# Patient Record
Sex: MALE | Race: WHITE | NOT HISPANIC OR LATINO | Employment: FULL TIME | URBAN - METROPOLITAN AREA
[De-identification: names, ages, dates, MRNs, and addresses within clinical notes are randomized per-mention and may not be internally consistent; named-entity substitution may affect disease eponyms.]

---

## 2023-11-16 ENCOUNTER — OFFICE VISIT (OUTPATIENT)
Dept: PRIMARY CARE | Facility: CLINIC | Age: 68
End: 2023-11-16
Payer: COMMERCIAL

## 2023-11-16 VITALS
HEART RATE: 74 BPM | SYSTOLIC BLOOD PRESSURE: 124 MMHG | TEMPERATURE: 97.6 F | DIASTOLIC BLOOD PRESSURE: 76 MMHG | OXYGEN SATURATION: 98 % | WEIGHT: 189 LBS | HEIGHT: 68 IN | BODY MASS INDEX: 28.64 KG/M2

## 2023-11-16 DIAGNOSIS — Z00.00 ROUTINE ADULT HEALTH MAINTENANCE: ICD-10-CM

## 2023-11-16 DIAGNOSIS — M25.511 CHRONIC PAIN OF BOTH SHOULDERS: ICD-10-CM

## 2023-11-16 DIAGNOSIS — Z23 FLU VACCINE NEED: ICD-10-CM

## 2023-11-16 DIAGNOSIS — M25.512 CHRONIC PAIN OF BOTH SHOULDERS: ICD-10-CM

## 2023-11-16 DIAGNOSIS — Z00.00 ROUTINE GENERAL MEDICAL EXAMINATION AT HEALTH CARE FACILITY: Primary | ICD-10-CM

## 2023-11-16 DIAGNOSIS — G89.29 CHRONIC PAIN OF BOTH SHOULDERS: ICD-10-CM

## 2023-11-16 DIAGNOSIS — Z00.00 MEDICARE ANNUAL WELLNESS VISIT, SUBSEQUENT: ICD-10-CM

## 2023-11-16 DIAGNOSIS — Z23 NEED FOR PNEUMOCOCCAL VACCINE: ICD-10-CM

## 2023-11-16 DIAGNOSIS — Z11.59 NEED FOR HEPATITIS C SCREENING TEST: ICD-10-CM

## 2023-11-16 DIAGNOSIS — Z12.5 SCREENING FOR PROSTATE CANCER: ICD-10-CM

## 2023-11-16 DIAGNOSIS — Z13.6 SCREENING FOR AAA (ABDOMINAL AORTIC ANEURYSM): ICD-10-CM

## 2023-11-16 PROCEDURE — 90677 PCV20 VACCINE IM: CPT | Performed by: FAMILY MEDICINE

## 2023-11-16 PROCEDURE — 90472 IMMUNIZATION ADMIN EACH ADD: CPT | Performed by: FAMILY MEDICINE

## 2023-11-16 PROCEDURE — G0438 PPPS, INITIAL VISIT: HCPCS | Performed by: FAMILY MEDICINE

## 2023-11-16 PROCEDURE — 1159F MED LIST DOCD IN RCRD: CPT | Performed by: FAMILY MEDICINE

## 2023-11-16 PROCEDURE — 99397 PER PM REEVAL EST PAT 65+ YR: CPT | Performed by: FAMILY MEDICINE

## 2023-11-16 PROCEDURE — 90471 IMMUNIZATION ADMIN: CPT | Performed by: FAMILY MEDICINE

## 2023-11-16 PROCEDURE — 99214 OFFICE O/P EST MOD 30 MIN: CPT | Performed by: FAMILY MEDICINE

## 2023-11-16 PROCEDURE — 96372 THER/PROPH/DIAG INJ SC/IM: CPT | Performed by: FAMILY MEDICINE

## 2023-11-16 PROCEDURE — 1170F FXNL STATUS ASSESSED: CPT | Performed by: FAMILY MEDICINE

## 2023-11-16 PROCEDURE — 90662 IIV NO PRSV INCREASED AG IM: CPT | Performed by: FAMILY MEDICINE

## 2023-11-16 PROCEDURE — 1036F TOBACCO NON-USER: CPT | Performed by: FAMILY MEDICINE

## 2023-11-16 RX ORDER — TRIAMCINOLONE ACETONIDE 40 MG/ML
40 INJECTION, SUSPENSION INTRA-ARTICULAR; INTRAMUSCULAR ONCE
Status: COMPLETED | OUTPATIENT
Start: 2023-11-16 | End: 2023-11-16

## 2023-11-16 RX ORDER — OMEPRAZOLE 20 MG/1
20 CAPSULE, DELAYED RELEASE ORAL DAILY
COMMUNITY
Start: 2022-11-16 | End: 2024-03-22 | Stop reason: ALTCHOICE

## 2023-11-16 RX ORDER — LIDOCAINE HYDROCHLORIDE 20 MG/ML
2 INJECTION, SOLUTION INFILTRATION; PERINEURAL ONCE
Status: COMPLETED | OUTPATIENT
Start: 2023-11-16 | End: 2023-11-16

## 2023-11-16 RX ORDER — FLUTICASONE PROPIONATE 50 MCG
2 SPRAY, SUSPENSION (ML) NASAL DAILY
COMMUNITY
Start: 2017-08-03 | End: 2024-03-22 | Stop reason: ALTCHOICE

## 2023-11-16 RX ADMIN — TRIAMCINOLONE ACETONIDE 40 MG: 40 INJECTION, SUSPENSION INTRA-ARTICULAR; INTRAMUSCULAR at 10:51

## 2023-11-16 RX ADMIN — TRIAMCINOLONE ACETONIDE 40 MG: 40 INJECTION, SUSPENSION INTRA-ARTICULAR; INTRAMUSCULAR at 10:49

## 2023-11-16 RX ADMIN — LIDOCAINE HYDROCHLORIDE 2 ML: 20 INJECTION, SOLUTION INFILTRATION; PERINEURAL at 10:50

## 2023-11-16 RX ADMIN — LIDOCAINE HYDROCHLORIDE 2 ML: 20 INJECTION, SOLUTION INFILTRATION; PERINEURAL at 10:49

## 2023-11-16 ASSESSMENT — PATIENT HEALTH QUESTIONNAIRE - PHQ9
2. FEELING DOWN, DEPRESSED OR HOPELESS: NOT AT ALL
1. LITTLE INTEREST OR PLEASURE IN DOING THINGS: NOT AT ALL
SUM OF ALL RESPONSES TO PHQ9 QUESTIONS 1 AND 2: 0

## 2023-11-16 ASSESSMENT — ACTIVITIES OF DAILY LIVING (ADL)
BATHING: INDEPENDENT
GROCERY_SHOPPING: INDEPENDENT
DRESSING: INDEPENDENT
DOING_HOUSEWORK: INDEPENDENT
MANAGING_FINANCES: INDEPENDENT
TAKING_MEDICATION: INDEPENDENT

## 2023-11-16 NOTE — PROGRESS NOTES
"Subjective   Reason for Visit: Bony Fiore Sr. is an 68 y.o. male here for a Medicare Wellness visit.          Reviewed all medications by prescribing practitioner or clinical pharmacist (such as prescriptions, OTCs, herbal therapies and supplements) and documented in the medical record.    HPI    Preparing meals - independent  Using telephone - independent  Bladder - continent  Bowel - continent    Recent hospital stays - none    ADLs - able to dress, walk and bath independently  Instrumental ADL's - able to shop, maintain finances, managing medications, housekeeping independently    Depression: PHQ-2 (screening 2 mins) - negative    Opioid use -   none  Exercise -  active every day  Diet - well balanced  Pain score - significant pain in B shoulders    Providers    [employer] dementia screen - 5/5    Education - educated pt on healthy eating, exercise    Falls - none      Shoulder pain: continuing to have shoulder pain.  Not able to take time off work.  Would be interested in getting more injections.      Counseled pt on living will: encouraged pt to get living will done.    AAA screening: ordered     Prostate CA screen: ordered PSA    CV screening: stress test neg in 2022.    Colonoscopy: neg 2 years ago, fu in 26    Diabetes screening:  checking    Glaucoma screen:  sees annually, has cataract.    CT chest tob screen: NA    Vaccines: ordering flu and Prevnar.  Pt declines COVID.      Patient Care Team:  Ernie Chaidez MD as PCP - General  Ernie Chaidez MD as PCP - Employee [employer] PCP     Review of Systems   All other systems reviewed and are negative.      Objective   Vitals:  /76   Pulse 74   Temp 36.4 °C (97.6 °F)   Ht 1.727 m (5' 8\")   Wt 85.7 kg (189 lb)   SpO2 98%   BMI 28.74 kg/m²       Physical Exam  Vitals reviewed.   Constitutional:       General: He is not in acute distress.     Appearance: Normal appearance. He is well-developed. He is not diaphoretic.   HENT:      Head: Normocephalic " and atraumatic.      Right Ear: Tympanic membrane normal.      Left Ear: Tympanic membrane normal.      Nose: Nose normal.      Mouth/Throat:      Mouth: Mucous membranes are moist.   Eyes:      Pupils: Pupils are equal, round, and reactive to light.   Cardiovascular:      Rate and Rhythm: Normal rate and regular rhythm.      Heart sounds: Normal heart sounds. No murmur heard.     No friction rub. No gallop.   Pulmonary:      Effort: Pulmonary effort is normal.      Breath sounds: Normal breath sounds. No rales.   Abdominal:      General: Bowel sounds are normal.      Palpations: Abdomen is soft.      Tenderness: There is no abdominal tenderness.   Musculoskeletal:      Cervical back: Normal range of motion and neck supple.   Skin:     General: Skin is warm and dry.   Neurological:      Mental Status: He is alert.   Psychiatric:         Mood and Affect: Mood normal.         Assessment/Plan   Problem List Items Addressed This Visit    None  Visit Diagnoses       Routine general medical examination at health care facility    -  Primary    Relevant Orders    Lipid Panel    Comprehensive Metabolic Panel    Hemoglobin A1C    Chronic pain of both shoulders        Relevant Medications    triamcinolone acetonide (Kenalog-40) injection 40 mg (Completed)    lidocaine (Xylocaine) 20 mg/mL (2 %) injection 2 mL (Completed)    triamcinolone acetonide (Kenalog-40) injection 40 mg (Completed)    lidocaine (Xylocaine) 20 mg/mL (2 %) injection 2 mL (Completed)    Other Relevant Orders    Referral to Orthopaedic Surgery    XR shoulder left 2+ views    XR shoulder right 2+ views    Screening for prostate cancer        Relevant Orders    Prostate Specific Antigen    Screening for AAA (abdominal aortic aneurysm)        Relevant Orders    Vascular US Abdominal Aorta Aneurysm AAA Screening    Need for hepatitis C screening test        Relevant Orders    Hepatitis C Antibody    Flu vaccine need        Relevant Orders    Flu vaccine,  quadrivalent, high-dose, preservative free, age 65y+ (FLUZONE) (Completed)    Need for pneumococcal vaccine        Relevant Orders    Pneumococcal conjugate vaccine, 20-valent (PREVNAR 20) (Completed)    Medicare annual wellness visit, subsequent        Routine adult health maintenance              PROCEDURE NOTE: Verbal consent obtained from patient.  B shoulders cleaned w/ ETOH swabs.  Injected B shoulders from ant approach w/ 2 CC of lidocaine and 1 CC of kenalog.  No bleeding observed.  Pt tolerated procedure well w/ an immediate improvement in symptoms.     Checking screening testing.

## 2023-12-06 ENCOUNTER — LAB (OUTPATIENT)
Dept: LAB | Facility: LAB | Age: 68
End: 2023-12-06
Payer: COMMERCIAL

## 2023-12-06 ENCOUNTER — TELEPHONE (OUTPATIENT)
Dept: PRIMARY CARE | Facility: CLINIC | Age: 68
End: 2023-12-06

## 2023-12-06 DIAGNOSIS — Z12.5 SCREENING FOR PROSTATE CANCER: ICD-10-CM

## 2023-12-06 DIAGNOSIS — Z00.00 ROUTINE GENERAL MEDICAL EXAMINATION AT HEALTH CARE FACILITY: ICD-10-CM

## 2023-12-06 DIAGNOSIS — Z11.59 NEED FOR HEPATITIS C SCREENING TEST: ICD-10-CM

## 2023-12-06 LAB
ALBUMIN SERPL BCP-MCNC: 4.2 G/DL (ref 3.4–5)
ALP SERPL-CCNC: 34 U/L (ref 33–136)
ALT SERPL W P-5'-P-CCNC: 26 U/L (ref 10–52)
ANION GAP SERPL CALC-SCNC: 9 MMOL/L (ref 10–20)
AST SERPL W P-5'-P-CCNC: 25 U/L (ref 9–39)
BILIRUB SERPL-MCNC: 0.5 MG/DL (ref 0–1.2)
BUN SERPL-MCNC: 27 MG/DL (ref 6–23)
CALCIUM SERPL-MCNC: 9 MG/DL (ref 8.6–10.3)
CHLORIDE SERPL-SCNC: 104 MMOL/L (ref 98–107)
CHOLEST SERPL-MCNC: 211 MG/DL (ref 0–199)
CHOLESTEROL/HDL RATIO: 3
CO2 SERPL-SCNC: 29 MMOL/L (ref 21–32)
CREAT SERPL-MCNC: 0.91 MG/DL (ref 0.5–1.3)
EST. AVERAGE GLUCOSE BLD GHB EST-MCNC: 117 MG/DL
GFR SERPL CREATININE-BSD FRML MDRD: >90 ML/MIN/1.73M*2
GLUCOSE SERPL-MCNC: 93 MG/DL (ref 74–99)
HBA1C MFR BLD: 5.7 %
HDLC SERPL-MCNC: 69.6 MG/DL
LDLC SERPL CALC-MCNC: 126 MG/DL
NON HDL CHOLESTEROL: 141 MG/DL (ref 0–149)
POTASSIUM SERPL-SCNC: 4.6 MMOL/L (ref 3.5–5.3)
PROT SERPL-MCNC: 6.4 G/DL (ref 6.4–8.2)
PSA SERPL-MCNC: 1.51 NG/ML
SODIUM SERPL-SCNC: 137 MMOL/L (ref 136–145)
TRIGL SERPL-MCNC: 78 MG/DL (ref 0–149)
VLDL: 16 MG/DL (ref 0–40)

## 2023-12-06 PROCEDURE — 86803 HEPATITIS C AB TEST: CPT

## 2023-12-06 PROCEDURE — 80061 LIPID PANEL: CPT

## 2023-12-06 PROCEDURE — 83036 HEMOGLOBIN GLYCOSYLATED A1C: CPT

## 2023-12-06 PROCEDURE — 80053 COMPREHEN METABOLIC PANEL: CPT

## 2023-12-06 PROCEDURE — 84153 ASSAY OF PSA TOTAL: CPT

## 2023-12-06 PROCEDURE — 36415 COLL VENOUS BLD VENIPUNCTURE: CPT

## 2023-12-06 NOTE — TELEPHONE ENCOUNTER
----- Message from Ernie Chaidez MD sent at 12/6/2023  1:28 PM EST -----  BW looked good, better than last year

## 2023-12-07 LAB — HCV AB SER QL: NONREACTIVE

## 2024-01-10 ENCOUNTER — HOSPITAL ENCOUNTER (OUTPATIENT)
Dept: VASCULAR MEDICINE | Facility: HOSPITAL | Age: 69
Discharge: HOME | End: 2024-01-10
Payer: COMMERCIAL

## 2024-01-10 DIAGNOSIS — Z13.6 SCREENING FOR AAA (ABDOMINAL AORTIC ANEURYSM): ICD-10-CM

## 2024-01-10 PROCEDURE — 76706 US ABDL AORTA SCREEN AAA: CPT

## 2024-01-10 PROCEDURE — 76706 US ABDL AORTA SCREEN AAA: CPT | Performed by: SURGERY

## 2024-01-17 ENCOUNTER — OFFICE VISIT (OUTPATIENT)
Dept: ORTHOPEDIC SURGERY | Facility: CLINIC | Age: 69
End: 2024-01-17
Payer: COMMERCIAL

## 2024-01-17 DIAGNOSIS — M25.511 CHRONIC PAIN OF BOTH SHOULDERS: ICD-10-CM

## 2024-01-17 DIAGNOSIS — M25.512 CHRONIC PAIN OF BOTH SHOULDERS: ICD-10-CM

## 2024-01-17 DIAGNOSIS — G89.29 CHRONIC PAIN OF BOTH SHOULDERS: ICD-10-CM

## 2024-01-29 DIAGNOSIS — M25.511 BILATERAL SHOULDER PAIN, UNSPECIFIED CHRONICITY: ICD-10-CM

## 2024-01-29 DIAGNOSIS — M25.512 BILATERAL SHOULDER PAIN, UNSPECIFIED CHRONICITY: ICD-10-CM

## 2024-01-31 ENCOUNTER — HOSPITAL ENCOUNTER (OUTPATIENT)
Dept: RADIOLOGY | Facility: CLINIC | Age: 69
Discharge: HOME | End: 2024-01-31
Payer: COMMERCIAL

## 2024-01-31 ENCOUNTER — OFFICE VISIT (OUTPATIENT)
Dept: ORTHOPEDIC SURGERY | Facility: CLINIC | Age: 69
End: 2024-01-31
Payer: COMMERCIAL

## 2024-01-31 VITALS — HEIGHT: 68 IN | BODY MASS INDEX: 28.64 KG/M2 | WEIGHT: 189 LBS

## 2024-01-31 DIAGNOSIS — M25.511 BILATERAL SHOULDER PAIN, UNSPECIFIED CHRONICITY: ICD-10-CM

## 2024-01-31 DIAGNOSIS — M75.101 TEAR OF RIGHT ROTATOR CUFF, UNSPECIFIED TEAR EXTENT, UNSPECIFIED WHETHER TRAUMATIC: ICD-10-CM

## 2024-01-31 DIAGNOSIS — M25.512 BILATERAL SHOULDER PAIN, UNSPECIFIED CHRONICITY: ICD-10-CM

## 2024-01-31 PROCEDURE — 99204 OFFICE O/P NEW MOD 45 MIN: CPT | Performed by: STUDENT IN AN ORGANIZED HEALTH CARE EDUCATION/TRAINING PROGRAM

## 2024-01-31 PROCEDURE — 1125F AMNT PAIN NOTED PAIN PRSNT: CPT | Performed by: STUDENT IN AN ORGANIZED HEALTH CARE EDUCATION/TRAINING PROGRAM

## 2024-01-31 PROCEDURE — 1159F MED LIST DOCD IN RCRD: CPT | Performed by: STUDENT IN AN ORGANIZED HEALTH CARE EDUCATION/TRAINING PROGRAM

## 2024-01-31 PROCEDURE — 73030 X-RAY EXAM OF SHOULDER: CPT | Mod: 50

## 2024-01-31 PROCEDURE — 1160F RVW MEDS BY RX/DR IN RCRD: CPT | Performed by: STUDENT IN AN ORGANIZED HEALTH CARE EDUCATION/TRAINING PROGRAM

## 2024-01-31 PROCEDURE — 73030 X-RAY EXAM OF SHOULDER: CPT | Mod: BILATERAL PROCEDURE | Performed by: RADIOLOGY

## 2024-01-31 PROCEDURE — 1036F TOBACCO NON-USER: CPT | Performed by: STUDENT IN AN ORGANIZED HEALTH CARE EDUCATION/TRAINING PROGRAM

## 2024-01-31 RX ORDER — BISMUTH SUBSALICYLATE 262 MG
1 TABLET,CHEWABLE ORAL DAILY
COMMUNITY

## 2024-01-31 RX ORDER — GLUCOSAMINE/CHONDRO SU A 500-400 MG
1 TABLET ORAL DAILY
COMMUNITY

## 2024-01-31 ASSESSMENT — PAIN SCALES - GENERAL: PAINLEVEL_OUTOF10: 4

## 2024-01-31 ASSESSMENT — PAIN - FUNCTIONAL ASSESSMENT: PAIN_FUNCTIONAL_ASSESSMENT: 0-10

## 2024-01-31 NOTE — PROGRESS NOTES
PRIMARY CARE PHYSICIAN: Ernie Chaidez MD  REFERRING PROVIDER: No referring provider defined for this encounter.     CONSULT ORTHOPAEDIC: Shoulder Evaluation    ASSESSMENT & PLAN    Impression: 68 y.o. male with bilateral shoulder pain, right greater than left concerning for a full-thickness rotator cuff tear.    Plan:   I explained to the patient the nature of their diagnosis.  I reviewed their imaging studies with them.    Based on the history, physical exam and imaging studies above, the patient's presentation is consistent with the above diagnosis.  I had a long discussion with the patient regarding their presentation and the treatment options.  We discussed initial nonoperative versus operative management options as well as potential further diagnostic imaging.   I reviewed the patient's x-ray findings with him.  His examination today as well as his complaints are most consistent with/concerning for a full-thickness rotator cuff tear on the right.  He has failed extensive nonoperative management including multiple corticosteroid injections, home exercises for greater than 6 weeks, activity modification, ice and rest.  He continues to have pain and dysfunction in this right shoulder.  After long discussion with the patient regarding treatment options going forward, I recommend that we proceed with an MRI of the right shoulder prior to proceeding with any further nonoperative management to rule out a full-thickness rotator cuff tear that would require surgical intervention.    Follow-Up: Patient will follow-up after the MRI is completed to review the imaging studies and discuss a treatment plan going forward    At the end of the visit, all questions were answered in full. The patient is in agreement with the plan and recommendations. They will call the office with any questions/concerns.    Note dictated with [employer] software. Completed without full typed error editing and sent to avoid  delay.       SUBJECTIVE  CHIEF COMPLAINT: Bilateral shoulder pain     HPI: Bony Fiore Sr. is a 68 y.o. patient. Bony Fiore Sr. complains of bilateral shoulder pain. Right is worse than left. Pain for the last four years got worse over the last few months. He has tried injections in the past with only lasted a little over a month. Last time he had them was sometime in November.  He notes that the right arm is much more painful than the left.  This limits his ability to do his daily activities as well as his work as a .  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  He has pain and associated weakness.  He has tried corticosteroid injections, home exercises greater than 6 weeks, activity modification and anti-inflammatories.    They deny any associated neck pain.  No numbness, tingling, or paresthesias.    REVIEW OF SYSTEMS  Constitutional: See HPI for pain assessment, No significant weight loss, recent trauma  Cardiovascular: No chest pain, shortness of breath  Respiratory: No difficulty breathing, cough  Gastrointestinal: No nausea, vomiting, diarrhea, constipation  Musculoskeletal: Noted in HPI, positive for pain, restricted motion, stiffness  Integumentary: No rashes, easy bruising, redness   Neurological: no numbness or tingling in extremities, no gait disturbances   Psychiatric: No mood changes, memory changes, social issues  Heme/Lymph: no excessive swelling, easy bruising, excessive bleeding  ENT: No hearing changes  Eyes: No vision changes    Past Medical History:   Diagnosis Date    Other conditions influencing health status     Ulcer        No Known Allergies     Past Surgical History:   Procedure Laterality Date    NASAL SEPTUM SURGERY  10/26/2015    Nasal Septal Deviation Repair        No family history on file.     Social History     Socioeconomic History    Marital status:      Spouse name: Not on file    Number of children: Not on  "file    Years of education: Not on file    Highest education level: Not on file   Occupational History    Not on file   Tobacco Use    Smoking status: Former     Types: Cigarettes     Quit date:      Years since quittin.0    Smokeless tobacco: Never   Substance and Sexual Activity    Alcohol use: Not Currently    Drug use: Never    Sexual activity: Not on file   Other Topics Concern    Not on file   Social History Narrative    Not on file     Social Determinants of Health     Financial Resource Strain: Not on file   Food Insecurity: Not on file   Transportation Needs: Not on file   Physical Activity: Not on file   Stress: Not on file   Social Connections: Not on file   Intimate Partner Violence: Not on file   Housing Stability: Not on file        CURRENT MEDICATIONS:   Current Outpatient Medications   Medication Sig Dispense Refill    fluticasone (Flonase) 50 mcg/actuation nasal spray Administer 2 sprays into each nostril once daily.      omeprazole (PriLOSEC) 20 mg DR capsule Take 1 capsule (20 mg) by mouth early in the morning..       No current facility-administered medications for this visit.        OBJECTIVE    PHYSICAL EXAM      2023    10:32 AM   Vitals   Systolic 124   Diastolic 76   Heart Rate 74   Temp 36.4 °C (97.6 °F)   Height (in) 1.727 m (5' 8\")   Weight (lb) 189   BMI 28.74 kg/m2   BSA (m2) 2.03 m2   Visit Report Report      There is no height or weight on file to calculate BMI.    GENERAL: A/Ox3, NAD. Appears healthy, well nourished  PSYCHIATRIC: Mood stable, appropriate memory recall  EYES: EOM intact, no scleral icterus  CARDIOVASCULAR: Palpable peripheral pulses  LUNGS: Breathing non-labored on room air  SKIN: no erythema, rashes, or ecchymosis     MUSCULOSKELETAL:  Laterality: right Shoulder Exam  - Negative Spurlings, full painless neck ROM  - Skin intact  - No erythema or warmth  - No ecchymosis or soft tissue swelling  - Shoulder ROM: Forward flexion to 150 with a hitch at 90, " ER 45, IR upper lumbar  - Strength:      Jobes 4/5     ER 4+/5     Belly press and bearhug 5-/5  - Palpation: Positive tenderness biceps groove and posterolateral acromion  - Chiu and Neers positive  - Speeds and O'Briens positive    NEUROVASCULAR:  - Neurovascular Status: sensation intact to light touch distally, upper extremity motor grossly intact  - Capillary refill brisk at extremities, Bilateral peripheral pulses 2+    Imaging: Multiple views of the affected right shoulder(s) demonstrate: No significant osseous normality, no fracture, no significant degenerative change.   X-rays were personally reviewed and interpreted by me.  Radiology reports were reviewed by me as well, if readily available at the time.      Sean Osorio MD  Attending Surgeon    Sports Medicine Orthopaedic Surgery  [facility] [place]  [facility]

## 2024-02-15 ENCOUNTER — HOSPITAL ENCOUNTER (OUTPATIENT)
Dept: RADIOLOGY | Facility: CLINIC | Age: 69
Discharge: HOME | End: 2024-02-15
Payer: COMMERCIAL

## 2024-02-15 DIAGNOSIS — M75.101 TEAR OF RIGHT ROTATOR CUFF, UNSPECIFIED TEAR EXTENT, UNSPECIFIED WHETHER TRAUMATIC: ICD-10-CM

## 2024-02-15 PROCEDURE — 73221 MRI JOINT UPR EXTREM W/O DYE: CPT | Mod: RT

## 2024-02-15 PROCEDURE — 73221 MRI JOINT UPR EXTREM W/O DYE: CPT | Mod: RIGHT SIDE | Performed by: RADIOLOGY

## 2024-02-19 ENCOUNTER — OFFICE VISIT (OUTPATIENT)
Dept: ORTHOPEDIC SURGERY | Facility: CLINIC | Age: 69
End: 2024-02-19
Payer: COMMERCIAL

## 2024-02-19 DIAGNOSIS — S43.431A DEGENERATIVE SUPERIOR LABRAL ANTERIOR-TO-POSTERIOR (SLAP) TEAR OF RIGHT SHOULDER: ICD-10-CM

## 2024-02-19 DIAGNOSIS — S46.011D TRAUMATIC COMPLETE TEAR OF RIGHT ROTATOR CUFF, SUBSEQUENT ENCOUNTER: Primary | ICD-10-CM

## 2024-02-19 DIAGNOSIS — M25.811 SHOULDER IMPINGEMENT, RIGHT: ICD-10-CM

## 2024-02-19 DIAGNOSIS — M75.21 BICEPS TENDINITIS, RIGHT: ICD-10-CM

## 2024-02-19 PROCEDURE — 1159F MED LIST DOCD IN RCRD: CPT | Performed by: STUDENT IN AN ORGANIZED HEALTH CARE EDUCATION/TRAINING PROGRAM

## 2024-02-19 PROCEDURE — 1125F AMNT PAIN NOTED PAIN PRSNT: CPT | Performed by: STUDENT IN AN ORGANIZED HEALTH CARE EDUCATION/TRAINING PROGRAM

## 2024-02-19 PROCEDURE — 99214 OFFICE O/P EST MOD 30 MIN: CPT | Performed by: STUDENT IN AN ORGANIZED HEALTH CARE EDUCATION/TRAINING PROGRAM

## 2024-02-19 PROCEDURE — 1160F RVW MEDS BY RX/DR IN RCRD: CPT | Performed by: STUDENT IN AN ORGANIZED HEALTH CARE EDUCATION/TRAINING PROGRAM

## 2024-02-19 PROCEDURE — 1036F TOBACCO NON-USER: CPT | Performed by: STUDENT IN AN ORGANIZED HEALTH CARE EDUCATION/TRAINING PROGRAM

## 2024-02-19 NOTE — PROGRESS NOTES
PRIMARY CARE PHYSICIAN: Ernie Chaidez MD  REFERRING PROVIDER: No referring provider defined for this encounter.     CONSULT ORTHOPAEDIC: Shoulder Evaluation    ASSESSMENT & PLAN    Impression: 68 y.o. male with a right shoulder full-thickness rotator cuff tear, degenerative SLAP tear, biceps tenosynovitis and subacromial impingement/bursitis.    Plan:   I explained to the patient the nature of their diagnosis.  I reviewed their imaging studies with them.    Based on the history, physical exam and imaging studies above, the patient's presentation is consistent with the above diagnosis.  I had a long discussion with the patient regarding their presentation and the treatment options.  We discussed initial nonoperative versus operative management options as well as potential further diagnostic imaging.  I reviewed the patient's MRI findings with him.  He has a significant rotator cuff tear with full-thickness involvement of the supraspinatus, anterior infraspinatus and subscapularis with biceps tenosynovitis, degenerative SLAP tear and subacromial impingement/bursitis.  I reviewed these findings with the patient and we discussed his treatment options going forward.  He is an active male that requires capacity for heavy lifting and is having significant pain and dysfunction in this right shoulder due to his rotator cuff tear and other soft tissue pathology.  After long discussion with the patient, he elected to proceed with surgical intervention the form of a right shoulder arthroscopy, rotator cuff repair, intra-articular debridement and synovectomy, subacromial decompression and acromioplasty, open subpectoral biceps tenodesis.  I thoroughly explained the risks and benefits as well as the expected postoperative timeline for the proposed procedure versus nonoperative management. Risks of this procedure include but are not limited to bleeding, infection, nerve injury, DVT and failure of repair or implant.  The patient  expressed understanding and wished to proceed with surgical intervention.  All questions were answered. We will begin the presurgical process.  He would like to wait until the fall for his surgery, so he will return towards the end of summer for a preoperative visit.    The patient will require an abduction sling for postoperative joint stability. Additionally, in order to decrease the amount of narcotic pain medication usage and improve his pain control and swelling, I recommend a cryotherapy machine.    At the end of the visit, all questions were answered in full. The patient is in agreement with the plan and recommendations. They will call the office with any questions/concerns.    Note dictated with [employer] software. Completed without full typed error editing and sent to avoid delay.       SUBJECTIVE  CHIEF COMPLAINT: Bilateral shoulder pain     HPI: Bony Fiore Sr. is a 68 y.o. patient here to review MRI results.  He continues to have significant pain and dysfunction in this right shoulder.  He has difficulty with arm extended and overhead activities.  He is here today to review his MRI and discuss a treatment plan going forward.    Please see below for full history from prior visit:    Bony Fiore Sr. complains of bilateral shoulder pain. Right is worse than left. Pain for the last four years got worse over the last few months. He has tried injections in the past with only lasted a little over a month. Last time he had them was sometime in November.  He notes that the right arm is much more painful than the left.  This limits his ability to do his daily activities as well as his work as a .  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  He has pain and associated weakness.  He has tried corticosteroid injections, home exercises greater than 6 weeks, activity modification and anti-inflammatories.    They deny any associated neck  pain.  No numbness, tingling, or paresthesias.    REVIEW OF SYSTEMS  Constitutional: See HPI for pain assessment, No significant weight loss, recent trauma  Cardiovascular: No chest pain, shortness of breath  Respiratory: No difficulty breathing, cough  Gastrointestinal: No nausea, vomiting, diarrhea, constipation  Musculoskeletal: Noted in HPI, positive for pain, restricted motion, stiffness  Integumentary: No rashes, easy bruising, redness   Neurological: no numbness or tingling in extremities, no gait disturbances   Psychiatric: No mood changes, memory changes, social issues  Heme/Lymph: no excessive swelling, easy bruising, excessive bleeding  ENT: No hearing changes  Eyes: No vision changes    Past Medical History:   Diagnosis Date    Other conditions influencing health status     Ulcer        No Known Allergies     Past Surgical History:   Procedure Laterality Date    NASAL SEPTUM SURGERY  10/26/2015    Nasal Septal Deviation Repair        No family history on file.     Social History     Socioeconomic History    Marital status:      Spouse name: Not on file    Number of children: Not on file    Years of education: Not on file    Highest education level: Not on file   Occupational History    Not on file   Tobacco Use    Smoking status: Former     Types: Cigarettes     Quit date:      Years since quittin.1    Smokeless tobacco: Never   Substance and Sexual Activity    Alcohol use: Not Currently    Drug use: Never    Sexual activity: Not on file   Other Topics Concern    Not on file   Social History Narrative    Not on file     Social Determinants of Health     Financial Resource Strain: Not on file   Food Insecurity: Not on file   Transportation Needs: Not on file   Physical Activity: Not on file   Stress: Not on file   Social Connections: Not on file   Intimate Partner Violence: Not on file   Housing Stability: Not on file        CURRENT MEDICATIONS:   Current Outpatient Medications  "  Medication Sig Dispense Refill    fluticasone (Flonase) 50 mcg/actuation nasal spray Administer 2 sprays into each nostril once daily.      glucosamine-chondroitin 500-400 mg tablet Take 1 tablet by mouth 3 times a day.      multivitamin tablet Take 1 tablet by mouth once daily.      omeprazole (PriLOSEC) 20 mg DR capsule Take 1 capsule (20 mg) by mouth early in the morning..       No current facility-administered medications for this visit.        OBJECTIVE    PHYSICAL EXAM      11/16/2023    10:32 AM 1/31/2024     8:46 AM 2/15/2024     8:27 AM   Vitals   Systolic 124     Diastolic 76     Heart Rate 74     Temp 36.4 °C (97.6 °F)     Height (in) 1.727 m (5' 8\") 1.727 m (5' 8\") 1.753 m (5' 9\")   Weight (lb) 189 189 185   BMI 28.74 kg/m2 28.74 kg/m2 27.32 kg/m2   BSA (m2) 2.03 m2 2.03 m2 2.02 m2   Visit Report Report Report       There is no height or weight on file to calculate BMI.    GENERAL: A/Ox3, NAD. Appears healthy, well nourished  PSYCHIATRIC: Mood stable, appropriate memory recall  EYES: EOM intact, no scleral icterus  CARDIOVASCULAR: Palpable peripheral pulses  LUNGS: Breathing non-labored on room air  SKIN: no erythema, rashes, or ecchymosis     MUSCULOSKELETAL:  Laterality: right Shoulder Exam  - Negative Spurlings, full painless neck ROM  - Skin intact  - No erythema or warmth  - No ecchymosis or soft tissue swelling  - Shoulder ROM: Forward flexion to 150 with a hitch at 90, ER 45, IR upper lumbar  - Strength:      Jobes 4/5     ER 4+/5     Belly press and bearhug 5-/5  - Palpation: Positive tenderness biceps groove and posterolateral acromion  - Chiu and Neers positive  - Speeds and O'Briens positive    NEUROVASCULAR:  - Neurovascular Status: sensation intact to light touch distally, upper extremity motor grossly intact  - Capillary refill brisk at extremities, Bilateral peripheral pulses 2+    Imaging: MRI of the right shoulder reviewed by me demonstrates a massive full-thickness rotator cuff " tear involving the entirety of the supraspinatus, anterior infraspinatus, upper border subscapularis, no significant rotator cuff muscle belly atrophy, minimal degenerative changes of the glenohumeral joint, degenerative SLAP tear, biceps tenosynovitis, subacromial impingement/bursitis.  Images were personally reviewed and interpreted by me.  Radiology reports were reviewed by me as well, if readily available at the time.      Sean Osorio MD  Attending Surgeon    Sports Medicine Orthopaedic Surgery  [facility] [place]  [facility] [employer]

## 2024-02-21 ENCOUNTER — TELEPHONE (OUTPATIENT)
Dept: ORTHOPEDIC SURGERY | Facility: CLINIC | Age: 69
End: 2024-02-21
Payer: COMMERCIAL

## 2024-02-21 NOTE — TELEPHONE ENCOUNTER
Bony's wife Shana called requesting to be put on a list if anyone cancels there surgery for the end of March. They haven't picked a date yet but they are thinking around Fall time.

## 2024-02-22 NOTE — TELEPHONE ENCOUNTER
Bony will discuss the surgery dates 3/26 and 3/29 over with his wife tonight and give us a call tomorrow.

## 2024-02-22 NOTE — TELEPHONE ENCOUNTER
We currently have availability at the end of March. Can you please see if they would like to pick a surgery date or if they want to wait until later in the fall like originally planned?

## 2024-02-23 DIAGNOSIS — S46.011D TRAUMATIC COMPLETE TEAR OF RIGHT ROTATOR CUFF, SUBSEQUENT ENCOUNTER: ICD-10-CM

## 2024-02-26 PROBLEM — S43.431A DEGENERATIVE SUPERIOR LABRAL ANTERIOR-TO-POSTERIOR (SLAP) TEAR OF RIGHT SHOULDER: Status: ACTIVE | Noted: 2024-02-19

## 2024-02-26 PROBLEM — M25.811 SHOULDER IMPINGEMENT, RIGHT: Status: ACTIVE | Noted: 2024-02-19

## 2024-02-26 PROBLEM — S46.011A TRAUMATIC COMPLETE TEAR OF RIGHT ROTATOR CUFF: Status: ACTIVE | Noted: 2024-02-19

## 2024-02-26 PROBLEM — M75.21 BICEPS TENDINITIS, RIGHT: Status: ACTIVE | Noted: 2024-02-19

## 2024-03-22 ENCOUNTER — PRE-ADMISSION TESTING (OUTPATIENT)
Dept: PREADMISSION TESTING | Facility: HOSPITAL | Age: 69
End: 2024-03-22
Payer: COMMERCIAL

## 2024-03-22 VITALS
DIASTOLIC BLOOD PRESSURE: 80 MMHG | RESPIRATION RATE: 16 BRPM | HEART RATE: 64 BPM | SYSTOLIC BLOOD PRESSURE: 114 MMHG | TEMPERATURE: 98.4 F | WEIGHT: 193.78 LBS | HEIGHT: 69 IN | BODY MASS INDEX: 28.7 KG/M2 | OXYGEN SATURATION: 100 %

## 2024-03-22 DIAGNOSIS — Z01.818 PREOPERATIVE TESTING: Primary | ICD-10-CM

## 2024-03-22 DIAGNOSIS — S46.011D TRAUMATIC COMPLETE TEAR OF RIGHT ROTATOR CUFF, SUBSEQUENT ENCOUNTER: ICD-10-CM

## 2024-03-22 LAB
ANION GAP SERPL CALC-SCNC: 12 MMOL/L (ref 10–20)
BASOPHILS # BLD AUTO: 0.05 X10*3/UL (ref 0–0.1)
BASOPHILS NFR BLD AUTO: 1.1 %
BUN SERPL-MCNC: 30 MG/DL (ref 6–23)
CALCIUM SERPL-MCNC: 9.8 MG/DL (ref 8.6–10.3)
CHLORIDE SERPL-SCNC: 102 MMOL/L (ref 98–107)
CO2 SERPL-SCNC: 27 MMOL/L (ref 21–32)
CREAT SERPL-MCNC: 0.92 MG/DL (ref 0.5–1.3)
EGFRCR SERPLBLD CKD-EPI 2021: >90 ML/MIN/1.73M*2
EOSINOPHIL # BLD AUTO: 0.35 X10*3/UL (ref 0–0.7)
EOSINOPHIL NFR BLD AUTO: 7.5 %
ERYTHROCYTE [DISTWIDTH] IN BLOOD BY AUTOMATED COUNT: 12.3 % (ref 11.5–14.5)
GLUCOSE SERPL-MCNC: 99 MG/DL (ref 74–99)
HCT VFR BLD AUTO: 44.7 % (ref 41–52)
HGB BLD-MCNC: 14.9 G/DL (ref 13.5–17.5)
IMM GRANULOCYTES # BLD AUTO: 0 X10*3/UL (ref 0–0.7)
IMM GRANULOCYTES NFR BLD AUTO: 0 % (ref 0–0.9)
LYMPHOCYTES # BLD AUTO: 1.39 X10*3/UL (ref 1.2–4.8)
LYMPHOCYTES NFR BLD AUTO: 29.6 %
MCH RBC QN AUTO: 32.5 PG (ref 26–34)
MCHC RBC AUTO-ENTMCNC: 33.3 G/DL (ref 32–36)
MCV RBC AUTO: 98 FL (ref 80–100)
MONOCYTES # BLD AUTO: 0.54 X10*3/UL (ref 0.1–1)
MONOCYTES NFR BLD AUTO: 11.5 %
NEUTROPHILS # BLD AUTO: 2.36 X10*3/UL (ref 1.2–7.7)
NEUTROPHILS NFR BLD AUTO: 50.3 %
NRBC BLD-RTO: NORMAL /100{WBCS}
PLATELET # BLD AUTO: 186 X10*3/UL (ref 150–450)
POTASSIUM SERPL-SCNC: 4.4 MMOL/L (ref 3.5–5.3)
RBC # BLD AUTO: 4.58 X10*6/UL (ref 4.5–5.9)
SODIUM SERPL-SCNC: 137 MMOL/L (ref 136–145)
WBC # BLD AUTO: 4.7 X10*3/UL (ref 4.4–11.3)

## 2024-03-22 PROCEDURE — 99203 OFFICE O/P NEW LOW 30 MIN: CPT | Performed by: NURSE PRACTITIONER

## 2024-03-22 PROCEDURE — 85025 COMPLETE CBC W/AUTO DIFF WBC: CPT

## 2024-03-22 PROCEDURE — 36415 COLL VENOUS BLD VENIPUNCTURE: CPT

## 2024-03-22 PROCEDURE — 80048 BASIC METABOLIC PNL TOTAL CA: CPT

## 2024-03-22 RX ORDER — GLUCOSAM/CHONDRO/HERB 149/HYAL 750-100 MG
1000 TABLET ORAL DAILY
COMMUNITY

## 2024-03-22 RX ORDER — NAPROXEN 250 MG/1
500 TABLET ORAL 2 TIMES DAILY PRN
COMMUNITY
End: 2024-03-26 | Stop reason: HOSPADM

## 2024-03-22 RX ORDER — LANOLIN ALCOHOL/MO/W.PET/CERES
1000 CREAM (GRAM) TOPICAL DAILY
COMMUNITY

## 2024-03-22 RX ORDER — AMPICILLIN TRIHYDRATE 250 MG
600 CAPSULE ORAL DAILY
COMMUNITY

## 2024-03-22 ASSESSMENT — ENCOUNTER SYMPTOMS
ALLERGIC/IMMUNOLOGIC NEGATIVE: 1
ENDOCRINE NEGATIVE: 1
CARDIOVASCULAR NEGATIVE: 1
CONSTITUTIONAL NEGATIVE: 1
NEUROLOGICAL NEGATIVE: 1
ROS GI COMMENTS: INTERMITTENT GERD
PSYCHIATRIC NEGATIVE: 1
HEMATOLOGIC/LYMPHATIC NEGATIVE: 1
EYES NEGATIVE: 1
RESPIRATORY NEGATIVE: 1

## 2024-03-22 ASSESSMENT — PAIN - FUNCTIONAL ASSESSMENT: PAIN_FUNCTIONAL_ASSESSMENT: 0-10

## 2024-03-22 ASSESSMENT — PAIN DESCRIPTION - DESCRIPTORS: DESCRIPTORS: ACHING

## 2024-03-22 ASSESSMENT — PAIN SCALES - GENERAL: PAINLEVEL_OUTOF10: 6

## 2024-03-22 NOTE — PREPROCEDURE INSTRUCTIONS
Medication List            Accurate as of March 22, 2024  7:21 AM. Always use your most recent med list.                cyanocobalamin 1,000 mcg tablet  Commonly known as: Vitamin B-12  Medication Adjustments for Surgery: Stop 7 days before surgery     Fish OiL 1,000 mg (120 mg-180 mg) capsule  Generic drug: omega 3-dha-epa-fish oil  Medication Adjustments for Surgery: Stop 7 days before surgery     glucosamine-chondroitin 500-400 mg tablet  Medication Adjustments for Surgery: Stop 7 days before surgery     multivitamin tablet  Medication Adjustments for Surgery: Stop 7 days before surgery     naproxen 250 mg tablet  Commonly known as: Naprosyn  Medication Adjustments for Surgery: Stop 7 days before surgery     red yeast rice 600 mg capsule  Medication Adjustments for Surgery: Stop 7 days before surgery     TURMERIC ORAL  Medication Adjustments for Surgery: Stop 7 days before surgery                              NPO Instructions:    Do not eat any food after midnight the night before your surgery/procedure.    Additional Instructions:     Seven/Six Days before Surgery:  Review your medication instructions, stop indicated medications  Five Days before Surgery:  Review your medication instructions, stop indicated medications  Three Days before Surgery:  Review your medication instructions, stop indicated medications  The Day before Surgery:  Review your medication instructions, stop indicated medications  You will be contacted regarding the time of your arrival to facility and surgery time  Do not eat any food after Midnight  Day of Surgery:  Review your medication instructions, take indicated medications  Wear  comfortable loose fitting clothing  Do not use moisturizers, creams, lotions or perfume  All jewelry and valuables should be left at home  PAT DISCHARGE INSTRUCTIONS    Please call the Same Day Surgery (SDS) Department of the hospital where your procedure will be performed between 2:00- 3:30 PM the day  before your surgery. If you are scheduled on a Monday, or a Tuesday following a Monday holiday, you will need to call on the last business day prior to your surgery.    [facility]  [address] [place] [address]  [phone]    Please let your surgeon know if:      You develop any open sores, shingles, burning or painful urination as these may increase your risk of an infection.   You no longer wish to have the surgery.   Any other personal circumstances change that may lead to the need to cancel or defer this surgery-such as being sick or getting admitted to any hospital within one week of your planned procedure.    Your contact details change, such as a change of address or phone number.    Starting now:     Please DO NOT drink alcohol or smoke for 24 hours before surgery. It is well known that quitting smoking can make a huge difference to your health and recovery from surgery. The longer you abstain from smoking, the better your chances of a healthy recovery. If you need help with quitting, call 1-800-QUIT-NOW to be connected to a trained counselor who will discuss the best methods to help you quit.     Before your surgery:    Please stop all supplements 7 days prior to surgery. Or as directed by your surgeon.   Please stop taking NSAID pain medicine such as Advil and Motrin 7 days before surgery.    If you develop any fever, cough, cold, rashes, cuts, scratches, scrapes, urinary symptoms or infection anywhere on your body (including teeth and gums) prior to surgery, please call your surgeon’s office as soon as possible. This may require treatment to reduce the chance of cancellation on the day of surgery.    The day before your surgery:   DIET- Do not eat any food after MIDNIGHT.   Get a good night’s rest.  Use the special soap for bathing if you have been instructed to use one.    Scheduled surgery times may change and you will be notified if this occurs - please  check your personal voicemail for any updates.     On the morning of surgery:   Wear comfortable, loose fitting clothes which open in the front. Please do not wear moisturizers, creams, lotions, makeup or perfume.    Please bring with you to surgery:   Photo ID and insurance card   Current list of medicines and allergies   Pacemaker/ Defibrillator/Heart stent cards   CPAP machine and mask    Slings/ splints/ crutches   A copy of your complete advanced directive/DHPOA.    Please do NOT bring with you to surgery:   All jewelry and valuables should be left at home.   Prosthetic devices such as contact lenses, hearing aids, dentures, eyelash extensions, hairpins and body piercings must be removed prior to going in to the surgical suite.    After outpatient surgery:   A responsible adult MUST accompany you at the time of discharge and stay with you for 24 hours after your surgery. You may NOT drive yourself home after surgery.    Do not drive, operate machinery, make critical decisions or do activities that require co-ordination or balance until after a night’s sleep.   Do not drink alcoholic beverages for 24 hours.   Instructions for resuming your medications will be provided by your surgeon.    CALL YOUR DOCTOR AFTER SURGERY IF YOU HAVE:     Chills and/or a fever of 101° F or higher.    Redness, swelling, pus or drainage from your surgical wound or a bad smell from the wound.    Lightheadedness, fainting or confusion.    Persistent vomiting (throwing up) and are not able to eat or drink for 12 hours.    Three or more loose, watery bowel movements in 24 hours (diarrhea).   Difficulty or pain while urinating( after non-urological surgery)    Pain and swelling in your legs, especially if it is only on one side.    Difficulty breathing or are breathing faster than normal.    Any new concerning symptoms.      Reviewed pre-op instructions with patient, states understanding and denies further questions at this time.    If you  have not received a call regarding your arrival time for surgery by 2pm on the day before surgery, you can call [phone].    Take Care  CONTACT SURGEON'S OFFICE IF YOU DEVELOP:  * Fever = 100.4 F   * New respiratory symptoms (e.g. cough, shortness of breath, respiratory distress, sore throat)  * Recent loss of taste or smell  *Flu like symptoms such as headache, fatigue or gastrointestinal symptoms  * You develop any open sores, shingles, burning or painful urination   AND/OR:  * You no longer wish to have the surgery.  * Any other personal circumstances change that may lead to the need to cancel or defer this surgery.  *You were admitted to any hospital within one week of your planned procedure.    SMOKING:  *Quitting smoking can make a huge difference to your health and recovery from surgery.    *If you need help with quitting, call 6-988-QUIT-NOW.    THE DAY BEFORE SURGERY:  *Do not eat any food after midnight the night before your surgery.   *You may have up to TEN OUNCES of clear liquids until TWO hours before your instructed ARRIVAL TIME to hospital. This includes water, black tea/coffee, (no milk or cream) apple juice, clear broth and electrolyte drinks (Gatorade). Please avoid clear liquids that are red in color.   *You may chew gum/mints up to TWO hours before your surgery/procedure.    SURGICAL TIME:  *You will be contacted between 2 p.m. and 3 p.m. the business day before your surgery with your arrival time.  *If you haven't received a call by 3pm, call [phone]  *Scheduled surgery times may change and you will be notified if this occurs-check your personal voicemail for any updates.    ON THE MORNING OF SURGERY:  *Wear comfortable, loose fitting clothing.   *Do not use moisturizers, creams, lotions or perfume.  *All jewelry and valuables should be left at home.  *Prosthetic devices such as contact lenses, hearing aids, dentures, eyelash extensions, hairpins and body piercing must be removed  before surgery.    BRING WITH YOU:  *Photo ID and insurance card  *Current list of medications and allergies  *Pacemaker/Defibrillator/Heart stent cards  *CPAP machine and mask  *Slings/splints/crutches  *Copy of your complete Advanced Directive/DHPOA-if applicable  *Neurostimulator implant remote    PARKING AND ARRIVAL:  *Check in at the Main Entrance desk and let them know you are here for surgery.    IF YOU ARE HAVING OUTPATIENT/SAME DAY SURGERY:  *A responsible adult MUST accompany you at the time of discharge and stay with you for 24 hours after your surgery.  *You may NOT drive yourself home after surgery.  *You may use a taxi or ride sharing service ([employer], Uber) to return home ONLY if you are accompanied by a friend or family member.  *Instructions for resuming your medications will be provided by your surgeon.    Thank you for coming to Pre Admission testing.     If I have prescribe medication please don't forget to  at your pharmacy.     Any questions about today's visit call [phone] and leave a message in the general mailbox.    Patient instructed to ambulate as soon as possible postoperatively to decrease thromboembolic risk.    Shea Torres RN

## 2024-03-22 NOTE — CPM/PAT H&P
CPM/PAT Evaluation   Bony Fiore Sr. is a 68 y.o. male   Chief Complaint: I am having shoulder surgery for a torn rotator cuff    HPI:  Patient is a 66 y/o alert and oriented male, accompanied by his wife,  coming in for PAT for a scheduled Right Shoulder Arthroscopy, Rotator Cuff Repair, Intra-articular Debridement and Synovectomy, Subacromial Decompression and Acromioplasty, Open Subpectoral Biceps Tenodesis on 3/26/24 w /Jo.    The patient reports he has constant achying in his shoulder.  He reports his shoulder clicks, bangs and pop.  He has achiness down his arm.  NO numbness, tingling or decreased sensation.  No weakness.  No Decreased ROM to his shoulder.  He has had injections in the past but no physical therapy.  Patient denies chest pain, SOB, MARQUES and NVDC.    Patient also denies Hx: DVT/PE.    Current medications were reviewed and a presurgical mediation schedule was provided.    He has no questions at this time.   Past Medical History:   Diagnosis Date    Other conditions influencing health status     Ulcer      Past Surgical History:   Procedure Laterality Date    NASAL SEPTUM SURGERY  10/26/2015    Nasal Septal Deviation Repair        No Known Allergies     Current Outpatient Medications on File Prior to Visit   Medication Sig Dispense Refill    cyanocobalamin (Vitamin B-12) 1,000 mcg tablet Take 1 tablet (1,000 mcg) by mouth once daily.      glucosamine-chondroitin 500-400 mg tablet Take 1 tablet by mouth once daily.      multivitamin tablet Take 1 tablet by mouth once daily.      naproxen (Naprosyn) 250 mg tablet Take 2 tablets (500 mg) by mouth 2 times a day as needed for mild pain (1 - 3).      omega 3-dha-epa-fish oil (Fish OiL) 1,000 mg (120 mg-180 mg) capsule Take 1 capsule (1,000 mg) by mouth once daily.      red yeast rice 600 mg capsule Take 600 mg by mouth once daily.      TURMERIC ORAL Take 1 capsule by mouth once daily.      [DISCONTINUED] fluticasone (Flonase) 50  mcg/actuation nasal spray Administer 2 sprays into each nostril once daily.      [DISCONTINUED] omeprazole (PriLOSEC) 20 mg DR capsule Take 1 capsule (20 mg) by mouth early in the morning..       No current facility-administered medications on file prior to visit.       Review of Systems   Constitutional: Negative.    HENT: Negative.     Eyes: Negative.    Respiratory: Negative.     Cardiovascular: Negative.    Gastrointestinal:         Intermittent GERD   Endocrine: Negative.    Genitourinary: Negative.    Musculoskeletal:         See hpi for details   Skin: Negative.    Allergic/Immunologic: Negative.    Neurological: Negative.    Hematological: Negative.    Psychiatric/Behavioral: Negative.        Physical Exam  Vitals and nursing note reviewed.   Constitutional:       Appearance: Normal appearance.   HENT:      Head: Normocephalic and atraumatic.      Mouth/Throat:      Mouth: Mucous membranes are moist.      Pharynx: Oropharynx is clear.   Eyes:      Pupils: Pupils are equal, round, and reactive to light.   Cardiovascular:      Rate and Rhythm: Normal rate and regular rhythm.      Heart sounds: Normal heart sounds.   Pulmonary:      Effort: Pulmonary effort is normal.      Breath sounds: Normal breath sounds.   Abdominal:      General: Abdomen is flat. Bowel sounds are normal.      Palpations: Abdomen is soft.   Musculoskeletal:         General: Normal range of motion.      Cervical back: Normal range of motion and neck supple.   Skin:     General: Skin is warm and dry.   Neurological:      General: No focal deficit present.      Mental Status: He is alert and oriented to person, place, and time.   Psychiatric:         Mood and Affect: Mood normal.         Behavior: Behavior normal.         Thought Content: Thought content normal.         Judgment: Judgment normal.        PAT AIRWAY:   Airway:     Mallampati::  IV    TM distance::  >3 FB    Neck ROM::  Full    Has upper partial   Has several upper and lower  missing teeth  Ex smoker quit 30 yrs ago 1ppd x 15 years  3 beers a week  No drug use  No issues with anesthesia  No family issues with anesthesia    Airway  Vitals:    03/22/24 0710   BP: 114/80   Pulse: 64   Resp: 16   Temp: 36.9 °C (98.4 °F)   SpO2: 100%      No results found for this or any previous visit (from the past 24 hour(s)).   Assessment  Traumatic Complete Tear of Right Rotator Cuff, Biceps Tendinitis, Right, Degenerative SLAP Tear of Right Shoulder, Shoulder impingement Right Shoulder Arthroscopy, Rotator Cuff Repair, Intra-articular Debridement and Synovectomy, Subacromial Decompression and Acromioplasty, Open Subpectoral Biceps Tenodesis  Managed with naprosyn    GERD  Diet controlled    ASA II  RCRI - 0 points  Class I Risk 3.9%  LUDA -4  points moderate Risk for CHERRIE   NSQIP - Predicted length of stay 0 days  ARISCAT - 3 points Low Risk 1.6%  DASI 42.7 Points 7.99 Mets  LYNNETTE - 0.2%  JHFRAT - 1 points low risk for falls  Clearance - not indicated  PAT Testing - CBC, BMP    Face to Face patient contact time 30 minutes    YARA Jorge-CNP 3/22/2024 7:21 AM  Results for orders placed or performed in visit on 03/22/24 (from the past 24 hour(s))   Basic Metabolic Panel   Result Value Ref Range    Glucose 99 74 - 99 mg/dL    Sodium 137 136 - 145 mmol/L    Potassium 4.4 3.5 - 5.3 mmol/L    Chloride 102 98 - 107 mmol/L    Bicarbonate 27 21 - 32 mmol/L    Anion Gap 12 10 - 20 mmol/L    Urea Nitrogen 30 (H) 6 - 23 mg/dL    Creatinine 0.92 0.50 - 1.30 mg/dL    eGFR >90 >60 mL/min/1.73m*2    Calcium 9.8 8.6 - 10.3 mg/dL   CBC and Auto Differential   Result Value Ref Range    WBC 4.7 4.4 - 11.3 x10*3/uL    nRBC      RBC 4.58 4.50 - 5.90 x10*6/uL    Hemoglobin 14.9 13.5 - 17.5 g/dL    Hematocrit 44.7 41.0 - 52.0 %    MCV 98 80 - 100 fL    MCH 32.5 26.0 - 34.0 pg    MCHC 33.3 32.0 - 36.0 g/dL    RDW 12.3 11.5 - 14.5 %    Platelets 186 150 - 450 x10*3/uL    Neutrophils % 50.3 40.0 - 80.0 %    Immature  Granulocytes %, Automated 0.0 0.0 - 0.9 %    Lymphocytes % 29.6 13.0 - 44.0 %    Monocytes % 11.5 2.0 - 10.0 %    Eosinophils % 7.5 0.0 - 6.0 %    Basophils % 1.1 0.0 - 2.0 %    Neutrophils Absolute 2.36 1.20 - 7.70 x10*3/uL    Immature Granulocytes Absolute, Automated 0.00 0.00 - 0.70 x10*3/uL    Lymphocytes Absolute 1.39 1.20 - 4.80 x10*3/uL    Monocytes Absolute 0.54 0.10 - 1.00 x10*3/uL    Eosinophils Absolute 0.35 0.00 - 0.70 x10*3/uL    Basophils Absolute 0.05 0.00 - 0.10 x10*3/uL

## 2024-03-24 NOTE — DISCHARGE INSTRUCTIONS
Sean Osorio M.D.   Sports Medicine Orthopaedic Surgery    [facility]  [address] [facility]                  [address] [facility]       [address]  [facility], [address]   Phone: [phone]         Phone: [phone]       Phone: [phone]   Fax: [phone]                         Fax: [phone]       Fax: [phone]       AFTER SURGERY     Anesthesia  If you received a nerve block during surgery, you may have numbness or inability to move the limb. Do not be alarmed as this may last 8-36 hours depending upon the amount and type of medication used by the anesthesiologist. Make sure If you are experiencing numbness after 36 hours, please call the office. When the nerve block begins to wear off, you will feel a tingling sensation, like pins and needles. It is important that you start taking the pain medication at that time to ensure that you “stay ahead of the pain.” It is important to take the pain medicine when the pain level is a 4 or 5/10, before it gets too high.    Prescribed Medications   Narcotic pain medicine (Oxycodone): The goal of post-operative pain management is pain control, NOT pain elimination. You should expect some pain after surgery - this pain helps you protect itself while it is healing. Constipation, nausea, itching, and drowsiness are side effects of this type of medication. You should take an over-the-counter stool softener (Colace and/or Senna) while taking narcotics to prevent constipation. If you experience itching, over the counter Benadryl may be helpful. Narcotic pain medications often produce drowsiness and it is against the law to operate a vehicle while taking these medications. If you are taking oxycodone, you should take acetaminophen (Tylenol) around the clock to decrease baseline pain. Do not take Tylenol-containing products  while on Percocet or [place].   Refill Policy: For concerns over your safety due to the rising opioid addiction epidemic in the [place], refills of your narcotic pain medications will only be provided on a case by case basis. Please use these medications judiciously.    Anti-inflammatory (NSAID) medicine (Naproxen or Mobic): These are both anti-inflammatory and pain relief. Do NOT take this medication if you have had an ulcer in the past unless you have cleared this with your primary care doctor. You should take NSAIDs with food or antacid to reduce the chance of upset stomach. Depending on your surgery, Dr. Osorio may instruct you to avoid these medications.    Anti-nausea medicine (ondansetron/Zofran): sometimes patients experience nausea related to either anesthesia or the narcotic pain medication. If this is the case you will find this medication helpful.    DVT prophylaxis (Aspirin or Eliquis): For most patients, activity alone is sufficient to prevent dangerous blood clots, but in some cases your personal risk profile and/or the type of surgery you have undergone makes it necessary that you take medication to help prevent blood clots. Dr. Osorio will inform you if you are to start one of these medications postoperatively.    Stool softener (Colace and/or Senna): are available over the counter at your local pharmacy and should be taken while you are taking narcotic pain medication to avoid constipation. You should stop taking these medications if you develop diarrhea. Over the counter laxatives may be used if you develop painful constipation.     Diet   Start with clear liquids (water, juice, Gatorade) and light foods (jello, soup, crackers). Progress to normal diet as tolerated if you are not nauseated. Avoid greasy or spicy foods for the first 24hrs to avoid GI upset. Increase fluid intake to help prevent constipation.    Dressings / Wound Care  You may remove the outer dressing after 3 days and then can  shower. (If you have a splint, please leave the splint in place until follow-up.) Do not remove Steri-strips (white stickers) if present over your incisions. Steri-strips may fall off on their own, which is normal. After the bandage has been removed, you may leave the incisions open to air. Alternatively, if you prefer to keep them covered, you may do so with Band-Aids, a light gauze dressing, or a clean ACE wrap. You may shower after the bandage has been removed (3 days), but it is very important that you pat the wounds dry after the shower. It is OK to allow soap and water to run over the wound - DO NOT soak or scrub the wound. Outside of the shower, keep your incision clean and dry until your first postoperative visit, approximately 10-14 days after surgery. You may remove your sling or brace to shower, unless otherwise instructed. As your balance may be affected by recent surgery, we recommend placing a plastic chair in the shower to help prevent falls. Do NOT take baths, go into a pool, or soak the operative site until approved by Dr. Osorio.    Bracing / Physical Therapy   If you were given one, make sure you wear sling or brace at all times until your follow-up with Dr. Osorio. Only remove your sling or brace for physical therapy, home exercises, and hygiene. These are typically used for 4-6 weeks after surgery in order to protect the healing of the tissue.     Physical therapy is just as important to your recovery as the actual operation! If you were given a prescription for physical therapy, make sure you go to your appointments and do your exercises daily at home (especially motion exercises).     Ice is a very important part of your recovery. It helps reduce inflammation and improves pain control. You should ice a few times each day for 20-30 minutes at a time. Please make sure there is something under the ice (clean towel, cloth, T-shirt) so that the ice doesn't directly contact your skin. If you ordered  a commercially available ice machine (optional) and a compression setting is available, you should use LOW or no compression during the first 5 days. After that, you may increase compression setting as tolerated. If the compression is bothering you then do not use compression.    Driving / Travel  Ultimately, it is your judgment to decide when you are safe to drive, but if you are at all unsure, do not risk your life or someone else's. As a general guideline, you will not be able to drive for 4-6 weeks after surgery. You should certainly not drive while on narcotics pain medication or while in a brace or sling.     Avoid flights and long distance traveling for 6 weeks after surgery. It is important to discuss your travel plans with Dr. Osorio, as additional medications may need to be prescribed to help prevent blood clots if certain travel is unavoidable.     Return to Work or School   Your return to work will depend on what surgery was done and what type of work you do. Please note that these are general guidelines, and there may be modifications based on your unique situation. Typically, you may return to sedentary work or school 3-7 days after surgery if pain is tolerable and you are no longer requiring narcotic pain medication. In conjunction with your input, Dr. Osorio will determine when you may return to more physically rigorous demands.     If you had Knee or Hip Surgery   If your surgery involves a ligament reconstruction or tendon repair, you will typically be prescribed crutches for at least the first few days until pain and the postoperative protocol allows you to fully bear weight and also wear a brace for 4-6 weeks. If cartilage surgery or meniscus repair is performed, you may be on crutches for 6 weeks. Individual rehabilitation guidelines will vary based upon the unique situation and surgery of every patient, but take these general guidelines into account when planning return to work.     If you had  Shoulder Surgery   If your surgery involves a repair (rotator cuff repair, labral repair), you will have a sling on for six weeks after surgery. If you have a sling, you will need to wear it all day. Please wear the sling at all times except for bathing for the first 2 weeks minimum. As long as you can abide by the restrictions, you can return to work when you feel like you can do so safely. However, you will need to take into consideration driving and activities related to your job. You may be able to safely loosen it if you are able to keep your arm supported. Please understand that you will NOT be able to work with your arm away from your body, above shoulder level, or use your arm against gravity for approximately 8 weeks. For jobs that require physical labor, you may require four months or more to return to work. If your surgery does NOT involve a repair (subacromial decompression, distal clavicle excision, capsular release), then you will be in a sling for only a few days after surgery. When comfortable, you may return to work when ready to conduct normal activities of your job. Remember that you may be on narcotic pain medications and these should be discontinued prior to your return to work. For jobs that require physical labor, you may require 6 weeks or more to return to work.     If you had Elbow or Wrist Surgery   If your surgery involves a repair or reconstruction, you will have a splint and sling on followed by a brace for four to six weeks after surgery. As long as you can abide by the restrictions, you can return to work when you feel like you can do so safely. However, you will need to take into consideration driving and activities related to your job. If you have a sling, you will need to wear it all day unless otherwise instructed. You may be able to safely loosen it if you are able to keep your arm supported. For jobs that require physical labor, you may require four months or more to return to  work.    If you had Ankle Surgery   If your surgery involves a repair or reconstruction, you will have a splint followed by a walking boot for four to six weeks after surgery. As long as you can abide by the restrictions, you can return to work when you feel like you can do so safely. However, you will need to take into consideration driving and activities related to your job. For jobs that require physical labor, you may require four months or more to return to work.    Normal Sensations and Findings after Surgery:   PAIN: We do everything possible to make your pain/discomfort level tolerable, but some amount of pain is to be expected.   WARMTH: Mild warmth around the operative site is normal for up to 3 weeks.   REDNESS: Small amount of redness where the sutures enter the skin is normal. If redness worsens or spreads it is important that you contact the office.   DRAINAGE: A small amount is normal for the first 48-72 hours. If wounds continue to drain after this time (requiring multiple gauze changes per day), please contact the office.   NUMBNESS: Around the incision is common.   BRUISING: Is common and often tracks down the arm or leg due to gravity and results in an alarming appearance, but is common and will resolve with time.   FEVER: Low-grade fevers (less than 101.5°F) are common during the first week after surgery. You should drink plenty of fluids and breathe deeply.   CONSTIPATION: Post-operative pain medications as well as immobility can lead to constipation. Please consider taking an over the counter stool softener such as Colace and/or Senna if you experience constipation or if you have a history of constipation.    Follow-Up   A Follow-up appointment should be arranged for 10-14 days after surgery. If one has not been provided, please call the office to schedule.       NOTIFY US IMMEDIATELY FOR ANY OF THE FOLLOWING:   Most orthopedic surgical procedures are uneventful. However, complications can  occur. The following are things to be aware of in the immediate postoperative period.   FEVER - Temperature rises above 101.5°F or associated chills/sweats   WOUND - If you notice drainage more than 4 days after surgery, if the drainage turns yellows and foul smelling, if you need to change gauze multiple times per day, or if sutures become loose.   CARDIOVASCULAR - Chest pain, shortness of breath, palpitations, or fainting spells must be taken seriously. Go to the emergency room (or call 911) immediately for evaluation.   BLOOD CLOTS - Orthopaedic surgery patients are at risk for blood clots. While the risk is higher for lower extremity surgery, even those who have undergone upper extremity surgery are at an increased risk. Please notify Dr. Osorio if you or someone in your family has had blood clots or any type of known clotting disorder. Signs of blood clots may include calf pain or cramping, diffuse swelling in the leg and foot, or chest pain and shortness of breath. Please call the office or go to the hospital if you recognize any of these symptoms.   NAUSEA - If you have severe vomiting, diarrhea, or constipation, or cannot keep any liquid down   URINARY RETENTION - If you cannot urinate the night after surgery, please go to the Emergency Room.

## 2024-03-25 ENCOUNTER — TELEPHONE (OUTPATIENT)
Dept: ORTHOPEDIC SURGERY | Facility: CLINIC | Age: 69
End: 2024-03-25

## 2024-03-25 ENCOUNTER — ANESTHESIA EVENT (OUTPATIENT)
Dept: OPERATING ROOM | Facility: HOSPITAL | Age: 69
End: 2024-03-25
Payer: COMMERCIAL

## 2024-03-25 ENCOUNTER — APPOINTMENT (OUTPATIENT)
Dept: ORTHOPEDIC SURGERY | Facility: CLINIC | Age: 69
End: 2024-03-25
Payer: COMMERCIAL

## 2024-03-25 NOTE — TELEPHONE ENCOUNTER
I spoke with the patients wife and she said it was in error message that was sent stating his surgery was changed for today.

## 2024-03-26 ENCOUNTER — ANESTHESIA (OUTPATIENT)
Dept: OPERATING ROOM | Facility: HOSPITAL | Age: 69
End: 2024-03-26
Payer: COMMERCIAL

## 2024-03-26 ENCOUNTER — HOSPITAL ENCOUNTER (OUTPATIENT)
Facility: HOSPITAL | Age: 69
Setting detail: OUTPATIENT SURGERY
Discharge: HOME | End: 2024-03-26
Attending: STUDENT IN AN ORGANIZED HEALTH CARE EDUCATION/TRAINING PROGRAM | Admitting: STUDENT IN AN ORGANIZED HEALTH CARE EDUCATION/TRAINING PROGRAM
Payer: COMMERCIAL

## 2024-03-26 VITALS
OXYGEN SATURATION: 96 % | TEMPERATURE: 96.8 F | WEIGHT: 189.15 LBS | HEIGHT: 69 IN | BODY MASS INDEX: 28.02 KG/M2 | DIASTOLIC BLOOD PRESSURE: 82 MMHG | RESPIRATION RATE: 18 BRPM | HEART RATE: 58 BPM | SYSTOLIC BLOOD PRESSURE: 125 MMHG

## 2024-03-26 DIAGNOSIS — S46.011D TRAUMATIC COMPLETE TEAR OF RIGHT ROTATOR CUFF, SUBSEQUENT ENCOUNTER: ICD-10-CM

## 2024-03-26 DIAGNOSIS — M25.811 SHOULDER IMPINGEMENT, RIGHT: Primary | ICD-10-CM

## 2024-03-26 DIAGNOSIS — M75.21 BICEPS TENDINITIS, RIGHT: ICD-10-CM

## 2024-03-26 DIAGNOSIS — S43.431A DEGENERATIVE SUPERIOR LABRAL ANTERIOR-TO-POSTERIOR (SLAP) TEAR OF RIGHT SHOULDER: ICD-10-CM

## 2024-03-26 PROBLEM — K21.9 GASTROESOPHAGEAL REFLUX DISEASE: Status: ACTIVE | Noted: 2024-03-26

## 2024-03-26 PROCEDURE — A4649 SURGICAL SUPPLIES: HCPCS | Performed by: STUDENT IN AN ORGANIZED HEALTH CARE EDUCATION/TRAINING PROGRAM

## 2024-03-26 PROCEDURE — C1713 ANCHOR/SCREW BN/BN,TIS/BN: HCPCS | Performed by: STUDENT IN AN ORGANIZED HEALTH CARE EDUCATION/TRAINING PROGRAM

## 2024-03-26 PROCEDURE — A29807 PR SHLDR ARTHROSCOP,SURG,REPAIR,SLAP LESION: Performed by: ANESTHESIOLOGY

## 2024-03-26 PROCEDURE — 29827 SHO ARTHRS SRG RT8TR CUF RPR: CPT

## 2024-03-26 PROCEDURE — 2500000004 HC RX 250 GENERAL PHARMACY W/ HCPCS (ALT 636 FOR OP/ED): Performed by: ANESTHESIOLOGY

## 2024-03-26 PROCEDURE — 2500000005 HC RX 250 GENERAL PHARMACY W/O HCPCS: Performed by: ANESTHESIOLOGIST ASSISTANT

## 2024-03-26 PROCEDURE — 23430 REPAIR BICEPS TENDON: CPT

## 2024-03-26 PROCEDURE — 23430 REPAIR BICEPS TENDON: CPT | Performed by: STUDENT IN AN ORGANIZED HEALTH CARE EDUCATION/TRAINING PROGRAM

## 2024-03-26 PROCEDURE — 2780000003 HC OR 278 NO HCPCS: Performed by: STUDENT IN AN ORGANIZED HEALTH CARE EDUCATION/TRAINING PROGRAM

## 2024-03-26 PROCEDURE — 2500000004 HC RX 250 GENERAL PHARMACY W/ HCPCS (ALT 636 FOR OP/ED)

## 2024-03-26 PROCEDURE — 2500000004 HC RX 250 GENERAL PHARMACY W/ HCPCS (ALT 636 FOR OP/ED): Performed by: STUDENT IN AN ORGANIZED HEALTH CARE EDUCATION/TRAINING PROGRAM

## 2024-03-26 PROCEDURE — 3700000001 HC GENERAL ANESTHESIA TIME - INITIAL BASE CHARGE: Performed by: STUDENT IN AN ORGANIZED HEALTH CARE EDUCATION/TRAINING PROGRAM

## 2024-03-26 PROCEDURE — 64415 NJX AA&/STRD BRCH PLXS IMG: CPT | Performed by: ANESTHESIOLOGY

## 2024-03-26 PROCEDURE — 3600000004 HC OR TIME - INITIAL BASE CHARGE - PROCEDURE LEVEL FOUR: Performed by: STUDENT IN AN ORGANIZED HEALTH CARE EDUCATION/TRAINING PROGRAM

## 2024-03-26 PROCEDURE — 29823 SHO ARTHRS SRG XTNSV DBRDMT: CPT | Performed by: STUDENT IN AN ORGANIZED HEALTH CARE EDUCATION/TRAINING PROGRAM

## 2024-03-26 PROCEDURE — 2720000007 HC OR 272 NO HCPCS: Performed by: STUDENT IN AN ORGANIZED HEALTH CARE EDUCATION/TRAINING PROGRAM

## 2024-03-26 PROCEDURE — 29826 SHO ARTHRS SRG DECOMPRESSION: CPT | Performed by: STUDENT IN AN ORGANIZED HEALTH CARE EDUCATION/TRAINING PROGRAM

## 2024-03-26 PROCEDURE — 29827 SHO ARTHRS SRG RT8TR CUF RPR: CPT | Performed by: STUDENT IN AN ORGANIZED HEALTH CARE EDUCATION/TRAINING PROGRAM

## 2024-03-26 PROCEDURE — 3700000002 HC GENERAL ANESTHESIA TIME - EACH INCREMENTAL 1 MINUTE: Performed by: STUDENT IN AN ORGANIZED HEALTH CARE EDUCATION/TRAINING PROGRAM

## 2024-03-26 PROCEDURE — 7100000001 HC RECOVERY ROOM TIME - INITIAL BASE CHARGE: Performed by: STUDENT IN AN ORGANIZED HEALTH CARE EDUCATION/TRAINING PROGRAM

## 2024-03-26 PROCEDURE — 2500000005 HC RX 250 GENERAL PHARMACY W/O HCPCS: Performed by: STUDENT IN AN ORGANIZED HEALTH CARE EDUCATION/TRAINING PROGRAM

## 2024-03-26 PROCEDURE — A4217 STERILE WATER/SALINE, 500 ML: HCPCS | Performed by: STUDENT IN AN ORGANIZED HEALTH CARE EDUCATION/TRAINING PROGRAM

## 2024-03-26 PROCEDURE — 7100000009 HC PHASE TWO TIME - INITIAL BASE CHARGE: Performed by: STUDENT IN AN ORGANIZED HEALTH CARE EDUCATION/TRAINING PROGRAM

## 2024-03-26 PROCEDURE — 7100000010 HC PHASE TWO TIME - EACH INCREMENTAL 1 MINUTE: Performed by: STUDENT IN AN ORGANIZED HEALTH CARE EDUCATION/TRAINING PROGRAM

## 2024-03-26 PROCEDURE — 2500000001 HC RX 250 WO HCPCS SELF ADMINISTERED DRUGS (ALT 637 FOR MEDICARE OP)

## 2024-03-26 PROCEDURE — 2500000004 HC RX 250 GENERAL PHARMACY W/ HCPCS (ALT 636 FOR OP/ED): Performed by: ANESTHESIOLOGIST ASSISTANT

## 2024-03-26 PROCEDURE — A29807 PR SHLDR ARTHROSCOP,SURG,REPAIR,SLAP LESION: Performed by: ANESTHESIOLOGIST ASSISTANT

## 2024-03-26 PROCEDURE — 29823 SHO ARTHRS SRG XTNSV DBRDMT: CPT

## 2024-03-26 PROCEDURE — 29826 SHO ARTHRS SRG DECOMPRESSION: CPT

## 2024-03-26 PROCEDURE — 3600000009 HC OR TIME - EACH INCREMENTAL 1 MINUTE - PROCEDURE LEVEL FOUR: Performed by: STUDENT IN AN ORGANIZED HEALTH CARE EDUCATION/TRAINING PROGRAM

## 2024-03-26 PROCEDURE — 7100000002 HC RECOVERY ROOM TIME - EACH INCREMENTAL 1 MINUTE: Performed by: STUDENT IN AN ORGANIZED HEALTH CARE EDUCATION/TRAINING PROGRAM

## 2024-03-26 DEVICE — ANCHOR, BIOCOMPOSITE SWIVELOCK C, DBL LOADED, 4.75 X 22: Type: IMPLANTABLE DEVICE | Site: SHOULDER | Status: FUNCTIONAL

## 2024-03-26 DEVICE — IMPLANTABLE DEVICE: Type: IMPLANTABLE DEVICE | Site: SHOULDER | Status: FUNCTIONAL

## 2024-03-26 RX ORDER — ALBUTEROL SULFATE 0.83 MG/ML
2.5 SOLUTION RESPIRATORY (INHALATION) ONCE AS NEEDED
Status: DISCONTINUED | OUTPATIENT
Start: 2024-03-26 | End: 2024-03-26 | Stop reason: HOSPADM

## 2024-03-26 RX ORDER — HYDRALAZINE HYDROCHLORIDE 20 MG/ML
5 INJECTION INTRAMUSCULAR; INTRAVENOUS EVERY 30 MIN PRN
Status: DISCONTINUED | OUTPATIENT
Start: 2024-03-26 | End: 2024-03-26 | Stop reason: HOSPADM

## 2024-03-26 RX ORDER — ONDANSETRON HYDROCHLORIDE 2 MG/ML
INJECTION, SOLUTION INTRAVENOUS AS NEEDED
Status: DISCONTINUED | OUTPATIENT
Start: 2024-03-26 | End: 2024-03-26

## 2024-03-26 RX ORDER — BUPIVACAINE HYDROCHLORIDE 2.5 MG/ML
INJECTION, SOLUTION INFILTRATION; PERINEURAL AS NEEDED
Status: DISCONTINUED | OUTPATIENT
Start: 2024-03-26 | End: 2024-03-26 | Stop reason: HOSPADM

## 2024-03-26 RX ORDER — ASPIRIN 81 MG/1
81 TABLET ORAL 2 TIMES DAILY
Qty: 30 TABLET | Refills: 0 | Status: SHIPPED | OUTPATIENT
Start: 2024-03-26 | End: 2024-04-10

## 2024-03-26 RX ORDER — ACETAMINOPHEN 500 MG
1000 TABLET ORAL EVERY 8 HOURS PRN
Qty: 60 TABLET | Refills: 1 | Status: SHIPPED | OUTPATIENT
Start: 2024-03-26 | End: 2024-04-15

## 2024-03-26 RX ORDER — ONDANSETRON HYDROCHLORIDE 2 MG/ML
4 INJECTION, SOLUTION INTRAVENOUS ONCE AS NEEDED
Status: DISCONTINUED | OUTPATIENT
Start: 2024-03-26 | End: 2024-03-26 | Stop reason: HOSPADM

## 2024-03-26 RX ORDER — OXYCODONE HYDROCHLORIDE 5 MG/1
5 TABLET ORAL EVERY 4 HOURS PRN
Status: DISCONTINUED | OUTPATIENT
Start: 2024-03-26 | End: 2024-03-26 | Stop reason: HOSPADM

## 2024-03-26 RX ORDER — SODIUM CHLORIDE, SODIUM LACTATE, POTASSIUM CHLORIDE, CALCIUM CHLORIDE 600; 310; 30; 20 MG/100ML; MG/100ML; MG/100ML; MG/100ML
100 INJECTION, SOLUTION INTRAVENOUS CONTINUOUS
Status: DISCONTINUED | OUTPATIENT
Start: 2024-03-26 | End: 2024-03-26 | Stop reason: HOSPADM

## 2024-03-26 RX ORDER — CELECOXIB 200 MG/1
200 CAPSULE ORAL ONCE
Status: COMPLETED | OUTPATIENT
Start: 2024-03-26 | End: 2024-03-26

## 2024-03-26 RX ORDER — FENTANYL CITRATE 50 UG/ML
25 INJECTION, SOLUTION INTRAMUSCULAR; INTRAVENOUS EVERY 5 MIN PRN
Status: DISCONTINUED | OUTPATIENT
Start: 2024-03-26 | End: 2024-03-26 | Stop reason: HOSPADM

## 2024-03-26 RX ORDER — FENTANYL CITRATE 50 UG/ML
50 INJECTION, SOLUTION INTRAMUSCULAR; INTRAVENOUS ONCE
Status: COMPLETED | OUTPATIENT
Start: 2024-03-26 | End: 2024-03-26

## 2024-03-26 RX ORDER — CEFAZOLIN SODIUM 2 G/100ML
2 INJECTION, SOLUTION INTRAVENOUS ONCE
Status: COMPLETED | OUTPATIENT
Start: 2024-03-26 | End: 2024-03-26

## 2024-03-26 RX ORDER — DIPHENHYDRAMINE HYDROCHLORIDE 50 MG/ML
12.5 INJECTION INTRAMUSCULAR; INTRAVENOUS ONCE AS NEEDED
Status: DISCONTINUED | OUTPATIENT
Start: 2024-03-26 | End: 2024-03-26 | Stop reason: HOSPADM

## 2024-03-26 RX ORDER — ACETAMINOPHEN 325 MG/1
975 TABLET ORAL ONCE
Status: COMPLETED | OUTPATIENT
Start: 2024-03-26 | End: 2024-03-26

## 2024-03-26 RX ORDER — GLYCOPYRROLATE 0.2 MG/ML
INJECTION INTRAMUSCULAR; INTRAVENOUS AS NEEDED
Status: DISCONTINUED | OUTPATIENT
Start: 2024-03-26 | End: 2024-03-26

## 2024-03-26 RX ORDER — PHENYLEPHRINE HCL IN 0.9% NACL 1 MG/10 ML
SYRINGE (ML) INTRAVENOUS AS NEEDED
Status: DISCONTINUED | OUTPATIENT
Start: 2024-03-26 | End: 2024-03-26

## 2024-03-26 RX ORDER — MEPERIDINE HYDROCHLORIDE 25 MG/ML
12.5 INJECTION INTRAMUSCULAR; INTRAVENOUS; SUBCUTANEOUS EVERY 10 MIN PRN
Status: DISCONTINUED | OUTPATIENT
Start: 2024-03-26 | End: 2024-03-26 | Stop reason: HOSPADM

## 2024-03-26 RX ORDER — ONDANSETRON 4 MG/1
4 TABLET, FILM COATED ORAL EVERY 8 HOURS PRN
Qty: 20 TABLET | Refills: 0 | Status: SHIPPED | OUTPATIENT
Start: 2024-03-26 | End: 2024-04-02

## 2024-03-26 RX ORDER — MIDAZOLAM HYDROCHLORIDE 1 MG/ML
2 INJECTION, SOLUTION INTRAMUSCULAR; INTRAVENOUS ONCE
Status: COMPLETED | OUTPATIENT
Start: 2024-03-26 | End: 2024-03-26

## 2024-03-26 RX ORDER — ROCURONIUM BROMIDE 10 MG/ML
INJECTION, SOLUTION INTRAVENOUS AS NEEDED
Status: DISCONTINUED | OUTPATIENT
Start: 2024-03-26 | End: 2024-03-26

## 2024-03-26 RX ORDER — IPRATROPIUM BROMIDE 0.5 MG/2.5ML
500 SOLUTION RESPIRATORY (INHALATION) ONCE
Status: DISCONTINUED | OUTPATIENT
Start: 2024-03-26 | End: 2024-03-26 | Stop reason: HOSPADM

## 2024-03-26 RX ORDER — OXYCODONE HYDROCHLORIDE 5 MG/1
5 TABLET ORAL EVERY 4 HOURS PRN
Qty: 15 TABLET | Refills: 0 | Status: SHIPPED | OUTPATIENT
Start: 2024-03-26 | End: 2024-03-29

## 2024-03-26 RX ORDER — NEOSTIGMINE METHYLSULFATE 1 MG/ML
INJECTION, SOLUTION INTRAVENOUS AS NEEDED
Status: DISCONTINUED | OUTPATIENT
Start: 2024-03-26 | End: 2024-03-26

## 2024-03-26 RX ORDER — PROPOFOL 10 MG/ML
INJECTION, EMULSION INTRAVENOUS AS NEEDED
Status: DISCONTINUED | OUTPATIENT
Start: 2024-03-26 | End: 2024-03-26

## 2024-03-26 RX ORDER — GABAPENTIN 300 MG/1
300 CAPSULE ORAL ONCE
Status: COMPLETED | OUTPATIENT
Start: 2024-03-26 | End: 2024-03-26

## 2024-03-26 RX ORDER — LIDOCAINE HYDROCHLORIDE 10 MG/ML
INJECTION, SOLUTION EPIDURAL; INFILTRATION; INTRACAUDAL; PERINEURAL AS NEEDED
Status: DISCONTINUED | OUTPATIENT
Start: 2024-03-26 | End: 2024-03-26

## 2024-03-26 RX ADMIN — CEFAZOLIN SODIUM 2 G: 2 INJECTION, SOLUTION INTRAVENOUS at 14:10

## 2024-03-26 RX ADMIN — FENTANYL CITRATE 50 MCG: 50 INJECTION INTRAMUSCULAR; INTRAVENOUS at 13:22

## 2024-03-26 RX ADMIN — CELECOXIB 200 MG: 200 CAPSULE ORAL at 11:20

## 2024-03-26 RX ADMIN — GLYCOPYRROLATE 0.8 MG: 0.2 INJECTION INTRAMUSCULAR; INTRAVENOUS at 15:14

## 2024-03-26 RX ADMIN — Medication 100 MCG: at 14:30

## 2024-03-26 RX ADMIN — ONDANSETRON 4 MG: 2 INJECTION INTRAMUSCULAR; INTRAVENOUS at 15:05

## 2024-03-26 RX ADMIN — MIDAZOLAM HYDROCHLORIDE 2 MG: 1 INJECTION, SOLUTION INTRAMUSCULAR; INTRAVENOUS at 13:23

## 2024-03-26 RX ADMIN — ACETAMINOPHEN 975 MG: 325 TABLET ORAL at 11:20

## 2024-03-26 RX ADMIN — Medication 100 MCG: at 14:39

## 2024-03-26 RX ADMIN — Medication 100 MCG: at 14:21

## 2024-03-26 RX ADMIN — LIDOCAINE HYDROCHLORIDE 50 MG: 10 INJECTION, SOLUTION EPIDURAL; INFILTRATION; INTRACAUDAL; PERINEURAL at 14:05

## 2024-03-26 RX ADMIN — SODIUM CHLORIDE, SODIUM LACTATE, POTASSIUM CHLORIDE, AND CALCIUM CHLORIDE 100 ML/HR: 600; 310; 30; 20 INJECTION, SOLUTION INTRAVENOUS at 11:20

## 2024-03-26 RX ADMIN — ROCURONIUM BROMIDE 50 MG: 10 INJECTION, SOLUTION INTRAVENOUS at 14:05

## 2024-03-26 RX ADMIN — Medication 200 MCG: at 14:15

## 2024-03-26 RX ADMIN — SODIUM CHLORIDE, SODIUM LACTATE, POTASSIUM CHLORIDE, AND CALCIUM CHLORIDE: 600; 310; 30; 20 INJECTION, SOLUTION INTRAVENOUS at 15:06

## 2024-03-26 RX ADMIN — Medication 100 MCG: at 14:54

## 2024-03-26 RX ADMIN — NEOSTIGMINE METHYLSULFATE 4 MG: 1 INJECTION INTRAVENOUS at 15:14

## 2024-03-26 RX ADMIN — GABAPENTIN 300 MG: 300 CAPSULE ORAL at 11:20

## 2024-03-26 RX ADMIN — PROPOFOL 200 MG: 10 INJECTION, EMULSION INTRAVENOUS at 14:05

## 2024-03-26 SDOH — HEALTH STABILITY: MENTAL HEALTH: CURRENT SMOKER: 0

## 2024-03-26 ASSESSMENT — COLUMBIA-SUICIDE SEVERITY RATING SCALE - C-SSRS
1. IN THE PAST MONTH, HAVE YOU WISHED YOU WERE DEAD OR WISHED YOU COULD GO TO SLEEP AND NOT WAKE UP?: NO
2. HAVE YOU ACTUALLY HAD ANY THOUGHTS OF KILLING YOURSELF?: NO

## 2024-03-26 ASSESSMENT — PAIN SCALES - GENERAL
PAINLEVEL_OUTOF10: 0 - NO PAIN
PAINLEVEL_OUTOF10: 0 - NO PAIN
PAIN_LEVEL: 0
PAINLEVEL_OUTOF10: 0 - NO PAIN
PAINLEVEL_OUTOF10: 0 - NO PAIN

## 2024-03-26 ASSESSMENT — PAIN - FUNCTIONAL ASSESSMENT
PAIN_FUNCTIONAL_ASSESSMENT: 0-10

## 2024-03-26 NOTE — ANESTHESIA PROCEDURE NOTES
Airway  Date/Time: 3/26/2024 2:07 PM  Urgency: elective      Staffing  Performed: BRIAN   Authorized by: Andi Tan MD    Performed by: BRIAN Herring  Patient location during procedure: OR    Indications and Patient Condition  Indications for airway management: anesthesia  Preoxygenated: yes  Mask difficulty assessment: 1 - vent by mask    Final Airway Details  Final airway type: endotracheal airway      Successful airway: ETT  Cuffed: yes   Successful intubation technique: direct laryngoscopy  Facilitating devices/methods: intubating stylet  Endotracheal tube insertion site: oral  Blade: Nataliia  Blade size: #3  ETT size (mm): 8.0  Cormack-Lehane Classification: grade IIa - partial view of glottis  Measured from: lips  ETT to lips (cm): 23  Number of attempts at approach: 1

## 2024-03-26 NOTE — PERIOPERATIVE NURSING NOTE
Pt awake, reoriented to time and place. Pt denies pain or PONV. Operative extremity remains in sling, loosened slightly around neck for circulation.  POC and DC readiness explained to pt, states understanding.

## 2024-03-26 NOTE — ANESTHESIA PREPROCEDURE EVALUATION
Patient: Bony Fiore Sr.    Procedure Information       Date/Time: 03/26/24 9308    Procedure: Right shoulder arthroscopy, rotator cuff repair, intra-articular debridement and synovectomy, subacromial decompression and acromioplasty, open subpectoral biceps tenodesis (Right: Shoulder) - 1hr    Location: [facility] / [facility]    Surgeons: Sean Osorio MD            Relevant Problems   Anesthesia (within normal limits)      Cardiovascular (within normal limits)      Endocrine (within normal limits)      GI   (+) Gastroesophageal reflux disease      /Renal (within normal limits)      Neuro/Psych (within normal limits)      Pulmonary (within normal limits)      GI/Hepatic (within normal limits)      Hematology (within normal limits)      Musculoskeletal (within normal limits)      Eyes, Ears, Nose, and Throat (within normal limits)      Infectious Disease (within normal limits)       Clinical information reviewed:   Tobacco  Allergies  Meds   Med Hx  Surg Hx   Fam Hx  Soc Hx      No Known Allergies  Past Medical History:   Diagnosis Date    GERD (gastroesophageal reflux disease)     Other conditions influencing health status     Ulcer     Past Surgical History:   Procedure Laterality Date    NASAL SEPTUM SURGERY  10/26/2015    Nasal Septal Deviation Repair     Prior to Admission medications    Medication Sig Start Date End Date Taking? Authorizing Provider   cyanocobalamin (Vitamin B-12) 1,000 mcg tablet Take 1 tablet (1,000 mcg) by mouth once daily.   Yes Historical Provider, MD   glucosamine-chondroitin 500-400 mg tablet Take 1 tablet by mouth once daily.   Yes Historical Provider, MD   multivitamin tablet Take 1 tablet by mouth once daily.   Yes Historical Provider, MD   naproxen (Naprosyn) 250 mg tablet Take 2 tablets (500 mg) by mouth 2 times a day as needed for mild pain (1 - 3).   Yes Historical Provider, MD   omega 3-dha-epa-fish oil (Fish OiL) 1,000 mg (120 mg-180 mg) capsule Take 1  capsule (1,000 mg) by mouth once daily.   Yes Historical Provider, MD   red yeast rice 600 mg capsule Take 600 mg by mouth once daily.   Yes Historical Provider, MD   TURMERIC ORAL Take 1 capsule by mouth once daily.   Yes Historical Provider, MD   acetaminophen (Tylenol) 500 mg tablet Take 2 tablets (1,000 mg) by mouth every 8 hours if needed for mild pain (1 - 3) for up to 20 days. 3/26/24 4/15/24  Gabrielle Lopresti, PA-C   aspirin 81 mg EC tablet Take 1 tablet (81 mg) by mouth 2 times a day for 15 days. 3/26/24 4/10/24  Gabrielle Lopresti, PA-C   ondansetron (Zofran) 4 mg tablet Take 1 tablet (4 mg) by mouth every 8 hours if needed for nausea or vomiting for up to 7 days. 3/26/24 4/2/24  Gabrielle Lopresti, PA-C   oxyCODONE (Roxicodone) 5 mg immediate release tablet Take 1 tablet (5 mg) by mouth every 4 hours if needed for severe pain (7 - 10) or moderate pain (4 - 6) for up to 3 days. 3/26/24 3/29/24  Gabrielle Lopresti, PA-C   fluticasone (Flonase) 50 mcg/actuation nasal spray Administer 2 sprays into each nostril once daily. 8/3/17 3/22/24  Historical Provider, MD   omeprazole (PriLOSEC) 20 mg DR capsule Take 1 capsule (20 mg) by mouth early in the morning.. 11/16/22 3/22/24  Historical Provider, MD       NPO Detail:  NPO/Void Status  Date of Last Liquid: 03/26/24  Time of Last Liquid: 0900  Date of Last Solid: 03/25/24  Time of Last Solid: 1900  Last Intake Type: Clear fluids  Time of Last Void: 1000         Physical Exam    Airway  Mallampati: II  TM distance: >3 FB     Cardiovascular - normal exam     Dental - normal exam     Pulmonary - normal exam     Abdominal            Anesthesia Plan    History of general anesthesia?: yes  History of complications of general anesthesia?: no    ASA 2     general     The patient is not a current smoker.  Patient was not previously instructed to abstain from smoking on day of procedure.  Patient did not smoke on day of procedure.  Education provided regarding risk of  obstructive sleep apnea.  intravenous induction   Anesthetic plan and risks discussed with patient.    Plan discussed with CRNA and CAA.

## 2024-03-26 NOTE — ANESTHESIA PROCEDURE NOTES
Peripheral Block    Patient location during procedure: pre-op  Start time: 3/26/2024 1:25 PM  End time: 3/26/2024 1:27 PM  Reason for block: at surgeon's request and post-op pain management  Staffing  Performed: attending   Authorized by: Brooks Padron MD    Performed by: Brooks Padron MD  Preanesthetic Checklist  Completed: patient identified, IV checked, site marked, risks and benefits discussed, surgical consent, monitors and equipment checked, pre-op evaluation and timeout performed   Timeout performed at: 3/26/2024 1:20 PM  Peripheral Block  Patient position: laying flat  Prep: ChloraPrep  Patient monitoring: heart rate, cardiac monitor and continuous pulse ox  Block type: interscalene  Injection technique: single-shot  Guidance: ultrasound guided  Needle  Needle type: short-bevel   Needle gauge: 22 G  Needle length: 5 cm  Needle localization: ultrasound guidance     image stored in chart  Test dose: negative  Assessment  Injection assessment: no paresthesia on injection, negative aspiration for heme, incremental injection and local visualized surrounding nerve on ultrasound  Paresthesia pain: none  Heart rate change: no  Slow fractionated injection: yes  Additional Notes  Ropivicaine 20 ml 0.5% with 5 mg decadron preservative free.    Patient able to flex and extend hand post nerve block.    Ultrasound guidance used-- able to visualize needle, Nerve roots and local anesthetic  filling around the nerve roots as it is injected.

## 2024-03-26 NOTE — ANESTHESIA POSTPROCEDURE EVALUATION
Patient: Bony Fiore Sr.    Procedure Summary       Date: 03/26/24 Room / Location: [facility]    Anesthesia Start: 1358 Anesthesia Stop: 1541    Procedure: Right shoulder arthroscopy, rotator cuff repair, intra-articular debridement and synovectomy, subacromial decompression and acromioplasty, open subpectoral biceps tenodesis (Right: Shoulder) Diagnosis:       Traumatic complete tear of right rotator cuff, subsequent encounter      Biceps tendinitis, right      Degenerative superior labral anterior-to-posterior (SLAP) tear of right shoulder      Shoulder impingement, right      (Traumatic complete tear of right rotator cuff, subsequent encounter [S46.011D])      (Biceps tendinitis, right [M75.21])      (Degenerative superior labral anterior-to-posterior (SLAP) tear of right shoulder [S43.431A])      (Shoulder impingement, right [M25.811])    Surgeons: Sean Osorio MD Responsible Provider: Andi Tan MD    Anesthesia Type: general ASA Status: 2            Anesthesia Type: general    Vitals Value Taken Time   /83 03/26/24 1555   Temp 35.9 °C (96.6 °F) 03/26/24 1539   Pulse 57 03/26/24 1555   Resp 14 03/26/24 1555   SpO2 98 % 03/26/24 1555       Anesthesia Post Evaluation    Patient location during evaluation: PACU  Patient participation: complete - patient participated  Level of consciousness: awake and alert  Pain score: 0  Pain management: adequate  Multimodal analgesia pain management approach  Airway patency: patent  Two or more strategies used to mitigate risk of obstructive sleep apnea  Cardiovascular status: acceptable and blood pressure returned to baseline  Respiratory status: acceptable  Hydration status: acceptable  Postoperative Nausea and Vomiting: none        There were no known notable events for this encounter.

## 2024-03-27 ASSESSMENT — PAIN SCALES - GENERAL: PAINLEVEL_OUTOF10: 2

## 2024-03-29 NOTE — OP NOTE
Right shoulder arthroscopy, rotator cuff repair, intra-articular debridement and synovectomy, subacromial decompression and acromioplasty, open subpectoral biceps tenodesis (R) Operative Note     Date: 3/26/2024  OR Location: [facility]    Name: Bony Fiore Sr., : 1955, Age: 68 y.o., MRN: [id number], Sex: male    Diagnosis  Pre-op Diagnosis     * Traumatic complete tear of right rotator cuff, subsequent encounter [S46.011D]     * Biceps tendinitis, right [M75.21]     * Degenerative superior labral anterior-to-posterior (SLAP) tear of right shoulder [S43.431A]     * Shoulder impingement, right [M25.811] Post-op Diagnosis     * Traumatic complete tear of right rotator cuff, subsequent encounter [S46.011D]     * Biceps tendinitis, right [M75.21]     * Degenerative superior labral anterior-to-posterior (SLAP) tear of right shoulder [S43.431A]     * Shoulder impingement, right [M25.811]     Procedures  1.  Right shoulder arthroscopy, rotator cuff repair  2.  Right shoulder arthroscopic subacromial decompression and acromioplasty  3.  Right shoulder arthroscopic extensive intra-articular treatment and synovectomy  4.  Right shoulder open subpectoral biceps tenodesis    Surgeons      * Sean Osorio - Primary    Resident/Fellow/Other Assistant:  Surgeon(s) and Role: Gabrielle LoPresti, PA-C    Procedure Summary  Anesthesia: Regional  ASA: II  Anesthesia Staff: Anesthesiologist: Andi Tan MD  C-AA: BRIAN Mora; BRIAN Herring  Estimated Blood Loss: 5mL  Intra-op Medications:   Administrations occurring from 1335 to 1505 on 24:   Medication Name Total Dose   EPINEPHrine (Adrenalin) 2 mg in sodium chloride 0.9 % 3,000 mL irrigation 2 mL   ceFAZolin in dextrose (iso-os) (Ancef) IVPB 2 g 2 g   lactated Ringer's infusion Cannot be calculated          Anesthesia Record               Intraprocedure I/O Totals          Intake    lactated Ringer's infusion 1100.00 mL    ceFAZolin in dextrose  (iso-os) (Ancef) IVPB 2 g 100.00 mL    Total Intake 1200 mL       Output    Est. Blood Loss 15 mL    Total Output 15 mL       Net    Net Volume 1185 mL          Specimen: No specimens collected     Staff:   Circulator: Adilene Jade RN  Scrub Person: Delgado Atkins; Susie Darby     Drains and/or Catheters: * None in log *    Tourniquet Times: N/A    Implants:  Implants       Type Name Action Serial No.      Implant ANCHOR, BIOCOMPOSITE SWIVELOCK C, DBL LOADED, 4.75 X 22 - [id number] Implanted      [employer] HEALICOIL REGENESORB 4.75MM SUTURE ANCHOR WITH THREE MINITAPE SUTURES SOL AND NEPHEW Implanted [id number]     Implant ANCHOR, BIOCOMPOSITE SWIVELOCK C, DBL LOADED, 4.75 X 22 - [id number] Implanted      [employer] 1.8MM Q-FIX ALL-SUTURE ANCHOR WITH ONE MINITAPE SUTURE (COBRAIDE WHITE) SOL AND NEPHEW Implanted [id number]              Findings: Full-thickness supraspinatus tear, degenerative SLAP tear, biceps tenosynovitis, subacromial impingement/bursitis    Indications: Bony Fiore Sr. is an 68 y.o. male who is having surgery for right shoulder full-thickness rotator cuff tear, degenerative SLAP tear, biceps tenosynovitis and subacromial impingement/bursitis.  He failed extensive nonoperative management.  After long discussion, the patient elected to proceed with surgical invention the form of a right shoulder arthroscopy, rotator repair, biceps tenodesis, subacromial decompression acromioplasty and intra-articular debridement.  I thoroughly explained the risks and benefits as well as the expected postoperative timeline for the proposed procedure versus nonoperative management. Risks of this procedure include but are not limited to bleeding, infection, nerve injury, DVT and failure of repair or implant.  The patient expressed understanding and wished to proceed with surgical intervention.  All questions were answered. They were consented to the above procedure at bedside.    The patient was seen in the  preoperative area. The risks, benefits, complications, treatment options, non-operative alternatives, expected recovery and outcomes were discussed with the patient. The possibilities of reaction to medication, pulmonary aspiration, injury to surrounding structures, bleeding, recurrent infection, the need for additional procedures, failure to diagnose a condition, and creating a complication requiring transfusion or operation were discussed with the patient. The patient concurred with the proposed plan, giving informed consent.  The site of surgery was properly noted/marked if necessary per policy. The patient has been actively warmed in preoperative area. Preoperative antibiotics have been ordered and given within 1 hours of incision. Venous thrombosis prophylaxis have been ordered including bilateral sequential compression devices    Procedure Details:   The patient was seen in the preoperative holding area where the correct site and side were signed my initials. The patient was seen by the anesthesia team and a preoperative regional anesthetic block was administered.  The patient was brought back to the operating room after smooth induction of LMA anesthesia she was placed in the beach chair position.  All bony prominences were padded.  The patient was belted and posted.  SCD boots were placed on bilateral lower extremities.  The contralateral arm was placed in a well-padded arm bullard.  The operative arm was prepped and draped in usual sterile fashion and placed in the pneumatic arm bullard.  Prior to the start of the procedure, preoperative antibiotics were administered by the anesthesia team.  Prior to incision, a preoperative time-out was performed confirming the correct patient, side, site and procedure to be performed.  All in the room were in agreement.    We began the procedure with the standard posterior portal to the shoulder.  Local anesthetic was administered at the posterior portal site and the  posterior portal was established with a 11. Blade through the skin.  The arthroscope was introduced atraumatically into the glenohumeral joint. An anterior portal was established using an outside in technique with spinal needle.  The skin was incised with a #11 blade and a threaded cannula was inserted atraumatically. Diagnostic arthroscopy was performed demonstrating mild degenerative changes on the glenoid and the humeral head.  There was extensive degenerative tearing of the labrum circumferentially with a degenerative SLAP tear.  The biceps tendon demonstrated severe tendinosis.  The biceps tendon was tenotomized for later tenodesis.  The subscapularis demonstrated some tearing of the upper border without full-thickness retracted tear.  The supraspinatus demonstrated a full-thickness tear of the entire width of the tendon.  An extensive intra-articular debridement was performed in the anterior superior and posterior aspects of the glenohumeral joint, glenoid, glenoid labrum, rotator cuff, humeral head and greater tuberosity, interval tissue and middle glenohumeral ligament with a combination of arthroscopic shaver and ablator.    At this point the arthroscope was removed and inserted into the subacromial space.  A lateral portal was established using outside in technique with a spinal needle followed by a sharp incision through the skin with a #11 blade and the arthroscopic trocar to dilate the portal.  There was extensive bursitis, and thus an extensive bursectomy was performed using a combination of the arthroscopic shaver and arthroscopic ablator.  The coracoacromial ligament was elevated off of the anterior acromion revealing an anterior acromial spur.  Using a bone cutting shaver, the subacromial spur was removed back to a flat surface completing the acromioplasty.  The rotator cuff was inspected from the acromial side which demonstrated a full-thickness tear of the supraspinatus.  A gentle decortication of  the greater tuberosity was performed in preparation for rotator cuff repair.    A triple loaded 4.75 bio composite Healicoil anchor was punched and inserted along the articular margin.  Sutures were then passed sequentially using a self retrieving suture passing device.  1 limb of each suture was then secured to a lateral row 4.75 bio composite SwiveLock which was punched and inserted along the lateral aspect of the greater tuberosity after the sutures were appropriately tensioned.  This was repeated for a second lateral row anchor with the second limb of each suture.  This demonstrated excellent double row repair with restoration of the supraspinatus down to its insertion on the greater tuberosity.    Arthroscopic photos were taken throughout.  Excess arthroscopic fluid was drained from the shoulder and the arthroscopic instruments were removed.      Finally attention was turned to the subpec biceps tenodesis.  A 2.5 cm incision in the axilla was made with a 15 blade and blunt dissection was carried out down to the subpec region.  The long head of the biceps tendon was palpated and manually removed from the incision.  A Bovie cautery was used to clear synovitic tissue out of the biceps groove.  Extensive synovitic tissue was removed from the biceps tendon.  A mini Q fix was drilled and inserted in the biceps groove.  1 limb of the suture was passed and [place] fashion through the musculotendinous junction of the biceps after it was appropriately tensioned.  The second limb was passed once and used as a post.  The biceps was then reduced into the groove using a push pull technique and tied. This demonstrated a secure repair of the biceps tendon which was under good tension.  The wound was thoroughly irrigated.  Biceps incision was closed in layers with buried interrupted 2-0 Vicryl suture on the subcuticular layer followed by running 3-0 Monocryl in the skin.  The wound was dressed with Dermabond, Telfa, dry gauze  and Tegaderm.    The wounds were thoroughly irrigated.  Portals were closed with buried interrupted 3-0 Monocryl sutures and dressed with Steri-Strips, dry gauze and Tegaderms.  The operative arm was placed in a shoulder sling with an abduction pillow with a cryotherapy pad placed on the operative shoulder.    At the end of the procedure all needle, lap and instrument counts were correct.    The patient was woken from anesthesia and transferred to the recovery room in stable condition.  The patient tolerated the procedure well.    I was present for all critical portions of this case.    Postoperative instructions:  The patient will remain in a shoulder sling with an abduction pillow at all times except for bathing.  The patient will return to see me in 2 weeks for a routine 2 week postoperative visit.    Gabrielle LoPresti, PA-C was present for the entire case.  Her assistance was necessary and critical to the successful completion of the procedure.  She provided skilled assistance with arm positioning, arthroscope manipulation, implant insertion and suture passage, wound closure.  A qualified assistant was not available to perform her portion of the case.    Complications:  None; patient tolerated the procedure well.    Disposition: PACU - hemodynamically stable.  Condition: stable     Attending Attestation: I was present and scrubbed for the entire procedure.    Sean Osorio  Phone Number: [phone]

## 2024-03-29 NOTE — BRIEF OP NOTE
Date: 3/26/2024  OR Location: [facility]    Name: Bony Fiore Sr., : 1955, Age: 68 y.o., MRN: [id number], Sex: male    Diagnosis  Pre-op Diagnosis     * Traumatic complete tear of right rotator cuff, subsequent encounter [S46.011D]     * Biceps tendinitis, right [M75.21]     * Degenerative superior labral anterior-to-posterior (SLAP) tear of right shoulder [S43.431A]     * Shoulder impingement, right [M25.811] Post-op Diagnosis     * Traumatic complete tear of right rotator cuff, subsequent encounter [S46.011D]     * Biceps tendinitis, right [M75.21]     * Degenerative superior labral anterior-to-posterior (SLAP) tear of right shoulder [S43.431A]     * Shoulder impingement, right [M25.811]     Procedures  1.  Right shoulder arthroscopy, rotator cuff repair  2.  Right shoulder arthroscopic subacromial decompression and acromioplasty  3.  Right shoulder arthroscopic extensive intra-articular treatment and synovectomy  4.  Right shoulder open subpectoral biceps tenodesis    Surgeons      * Sean Osorio - Primary    Resident/Fellow/Other Assistant:  Surgeon(s) and Role: Gabrielle LoPresti, PA-C    Procedure Summary  Anesthesia: Regional  ASA: II  Anesthesia Staff: Anesthesiologist: Andi Tan MD  C-AA: BRIAN Mora; BRIAN Herring  Estimated Blood Loss: 5mL  Intra-op Medications:   Administrations occurring from 1335 to 1505 on 24:   Medication Name Total Dose   EPINEPHrine (Adrenalin) 2 mg in sodium chloride 0.9 % 3,000 mL irrigation 2 mL   ceFAZolin in dextrose (iso-os) (Ancef) IVPB 2 g 2 g   lactated Ringer's infusion Cannot be calculated          Anesthesia Record               Intraprocedure I/O Totals          Intake    lactated Ringer's infusion 1100.00 mL    ceFAZolin in dextrose (iso-os) (Ancef) IVPB 2 g 100.00 mL    Total Intake 1200 mL       Output    Est. Blood Loss 15 mL    Total Output 15 mL       Net    Net Volume 1185 mL          Specimen: No specimens collected      Staff:   Circulator: Adilene Jade RN  Scrub Person: Delgado Atkins; Susie Darby    Findings: Full-thickness supraspinatus tear, degenerative SLAP tear, biceps tenosynovitis, subacromial impingement/bursitis    Complications:  None; patient tolerated the procedure well.     Disposition: PACU - hemodynamically stable.  Condition: stable  Specimens Collected: No specimens collected  Attending Attestation: I was present and scrubbed for the entire procedure.    Sean Osorio  Phone Number: [phone]

## 2024-04-05 ENCOUNTER — TELEPHONE (OUTPATIENT)
Dept: ORTHOPEDIC SURGERY | Facility: CLINIC | Age: 69
End: 2024-04-05
Payer: COMMERCIAL

## 2024-04-05 NOTE — TELEPHONE ENCOUNTER
----- Message from Shana Fiore on behalf of Bony Fiore Sr. sent at 4/5/2024 10:35 AM EDT -----  Regarding: [place]  Contact: [phone]  I have been speaking with Corinne with Dr. Osorio;s office regarding my FMLA - they are still needing some information and was supposed to fax it the other day.  I have attached what they sent me if you can please forward this to her I would appreciate it.       This new company has been very difficult to deal with.     Thanks   Shana

## 2024-04-08 ENCOUNTER — OFFICE VISIT (OUTPATIENT)
Dept: ORTHOPEDIC SURGERY | Facility: CLINIC | Age: 69
End: 2024-04-08
Payer: COMMERCIAL

## 2024-04-08 VITALS — HEIGHT: 69 IN | WEIGHT: 189 LBS | BODY MASS INDEX: 27.99 KG/M2

## 2024-04-08 DIAGNOSIS — M75.101 TEAR OF RIGHT ROTATOR CUFF, UNSPECIFIED TEAR EXTENT, UNSPECIFIED WHETHER TRAUMATIC: Primary | ICD-10-CM

## 2024-04-08 PROCEDURE — 1159F MED LIST DOCD IN RCRD: CPT

## 2024-04-08 PROCEDURE — 99024 POSTOP FOLLOW-UP VISIT: CPT

## 2024-04-08 PROCEDURE — 1036F TOBACCO NON-USER: CPT

## 2024-04-08 PROCEDURE — 1160F RVW MEDS BY RX/DR IN RCRD: CPT

## 2024-04-08 ASSESSMENT — PAIN - FUNCTIONAL ASSESSMENT: PAIN_FUNCTIONAL_ASSESSMENT: NO/DENIES PAIN

## 2024-04-08 NOTE — PROGRESS NOTES
PRIMARY CARE PHYSICIAN: Ernie Chaidez MD    ORTHOPAEDIC POSTOPERATIVE VISIT    ASSESSMENT & PLAN    Impression: 68 y.o. male 2 weeks postop s/p Right shoulder arthroscopy, rotator cuff repair, intra-articular debridement and synovectomy, subacromial decompression and acromioplasty, open subpectoral biceps tenodesis done 03/26/2024.    Plan:   Overall Bony is doing well.  He continues to have some postoperative pain but it is improving.  He can begin performing Codman's pendulums out of his sling but otherwise will wear it at all times except for hygiene.  He will ice and rest the right shoulder.  We discussed the importance of adhering to his protocol for the above and expressed understanding.  He will follow-up with us in 4 weeks.    I reviewed the intraoperative findings with the patient and answered all their questions. I reviewed their postoperative timeline and plan with them. They understand the postoperative precautions and the treatment plan going forward.     Follow-Up: Patient will follow-up in 4 weeks, no x-rays    At the end of the visit, all questions were answered in full. The patient is in agreement with the plan and recommendations. They will call the office with any questions/concerns.      Note dictated with [employer] technology software. Completed without full typed error editing and sent to avoid delay.      SUBJECTIVE  CHIEF COMPLAINT:   Chief Complaint   Patient presents with    Right Shoulder - Post-op        HPI: Bony Fiore Sr. is a 68 y.o. patient now 2 weeks postop status post Right shoulder arthroscopy, rotator cuff repair, intra-articular debridement and synovectomy, subacromial decompression and acromioplasty, open subpectoral biceps tenodesis done 03/26/2024.  He is doing well overall.  He is having some trouble sleeping due to the sling and some persistent post-operative pain.  He states has been wearing his sling as instructed.  No concerns at this  time.    REVIEW OF SYSTEMS  Constitutional: See HPI for pain assessment, No significant recent weight gain or loss, no fever or chills  Cardiovascular: No chest pain, shortness of breath  Respiratory: No difficulty breathing, no cough  Gastrointestinal: No nausea, vomiting, diarrhea, constipation  Musculoskeletal: Noted in HPI, positive for pain, restricted motion, stiffness  Integumentary: No rashes, easy bruising or skin lesions  Neurological: No headache, no numbness or tingling in extremities  Psychiatric: No mood changes or memory changes. No social issues  Heme/Lymph: No excessive swelling, easy bruising or excessive bleeding  ENT: No hearing changes, no nosebleeds  Eyes: No vision changes    CURRENT MEDICATIONS:   Current Outpatient Medications   Medication Sig Dispense Refill    acetaminophen (Tylenol) 500 mg tablet Take 2 tablets (1,000 mg) by mouth every 8 hours if needed for mild pain (1 - 3) for up to 20 days. 60 tablet 1    aspirin 81 mg EC tablet Take 1 tablet (81 mg) by mouth 2 times a day for 15 days. 30 tablet 0    cyanocobalamin (Vitamin B-12) 1,000 mcg tablet Take 1 tablet (1,000 mcg) by mouth once daily.      glucosamine-chondroitin 500-400 mg tablet Take 1 tablet by mouth once daily.      multivitamin tablet Take 1 tablet by mouth once daily.      omega 3-dha-epa-fish oil (Fish OiL) 1,000 mg (120 mg-180 mg) capsule Take 1 capsule (1,000 mg) by mouth once daily.      red yeast rice 600 mg capsule Take 600 mg by mouth once daily.      TURMERIC ORAL Take 1 capsule by mouth once daily.       No current facility-administered medications for this visit.        OBJECTIVE    PHYSICAL EXAM      3/26/2024     1:00 PM 3/26/2024     1:25 PM 3/26/2024     1:35 PM 3/26/2024     1:45 PM 3/26/2024     3:39 PM 3/26/2024     3:55 PM 3/26/2024     4:15 PM   Vitals   Systolic 131 109 121 104 129 124 125   Diastolic 85 81 78 73 75 83 82   Heart Rate 61 56 55 57 65 57 58   Temp     35.9 °C (96.6 °F)  36 °C (96.8 °F)    Resp 10 12 10 11 14 14 18      There is no height or weight on file to calculate BMI.    General: Well-appearing, no acute distress    Skin intact bilateral upper and lower extremities  No erythema  No warmth    Detailed examination of the right shoulder demonstrates:  Surgical incision(s) healing well  No erythema or warmth  No drainage  No ecchymosis  Resolving swelling, minimal  Biceps contour normal  Shoulder range of motion deferred  Upper extremity motor grossly intact  C5-T1 sensation intact bilaterally  2+ radial pulses bilaterally  Warm and well-perfused, brisk capillary refill    IMAGING:   No new imaging

## 2024-05-06 ENCOUNTER — EVALUATION (OUTPATIENT)
Dept: PHYSICAL THERAPY | Facility: CLINIC | Age: 69
End: 2024-05-06
Payer: COMMERCIAL

## 2024-05-06 ENCOUNTER — OFFICE VISIT (OUTPATIENT)
Dept: ORTHOPEDIC SURGERY | Facility: CLINIC | Age: 69
End: 2024-05-06
Payer: COMMERCIAL

## 2024-05-06 VITALS — BODY MASS INDEX: 27.99 KG/M2 | HEIGHT: 69 IN | WEIGHT: 189 LBS

## 2024-05-06 DIAGNOSIS — M62.81 MUSCLE WEAKNESS OF RIGHT UPPER EXTREMITY: ICD-10-CM

## 2024-05-06 DIAGNOSIS — M25.611 DECREASED ROM OF RIGHT SHOULDER: Primary | ICD-10-CM

## 2024-05-06 DIAGNOSIS — M75.101 TEAR OF RIGHT ROTATOR CUFF, UNSPECIFIED TEAR EXTENT, UNSPECIFIED WHETHER TRAUMATIC: ICD-10-CM

## 2024-05-06 PROCEDURE — 99024 POSTOP FOLLOW-UP VISIT: CPT

## 2024-05-06 PROCEDURE — 97110 THERAPEUTIC EXERCISES: CPT | Mod: GP

## 2024-05-06 PROCEDURE — 1036F TOBACCO NON-USER: CPT

## 2024-05-06 PROCEDURE — 1160F RVW MEDS BY RX/DR IN RCRD: CPT

## 2024-05-06 PROCEDURE — 97161 PT EVAL LOW COMPLEX 20 MIN: CPT | Mod: GP

## 2024-05-06 PROCEDURE — 1159F MED LIST DOCD IN RCRD: CPT

## 2024-05-06 ASSESSMENT — ENCOUNTER SYMPTOMS
LOSS OF SENSATION IN FEET: 0
DEPRESSION: 0
OCCASIONAL FEELINGS OF UNSTEADINESS: 0

## 2024-05-06 ASSESSMENT — PAIN DESCRIPTION - DESCRIPTORS: DESCRIPTORS: ACHING;TIGHTNESS

## 2024-05-06 ASSESSMENT — PAIN SCALES - GENERAL: PAINLEVEL_OUTOF10: 2

## 2024-05-06 ASSESSMENT — PAIN - FUNCTIONAL ASSESSMENT: PAIN_FUNCTIONAL_ASSESSMENT: 0-10

## 2024-05-06 NOTE — PROGRESS NOTES
Physical Therapy    Physical Therapy Evaluation    Patient Name: Bony Fiore Sr.  MRN: [id number]  Today's Date: 5/6/2024  Time Calculation  Start Time: 1450  Stop Time: 1540  Time Calculation (min): 50 min    Assessment  PT Assessment Results: Decreased strength, Decreased range of motion, Orthopedic restrictions, Pain  Rehab Prognosis: Excellent  Assessment Comment: Bony presents with the above expected deficits following his right arthroscopic RTC repair; open biceps tenodesis, S.A.D., acromioplasty and debridement 6 weeks ago. He will benefit from skilled outpatient PT per post op protocol to address deficits and facilitate his return to his PLOF.    Plan  Treatment/Interventions: Cryotherapy, Education/ Instruction, Hot pack, Manual therapy, Therapeutic activities, Therapeutic exercises  PT Plan: Skilled PT  PT Frequency: 2 times per week  Duration: 12 week ; pending progress  Onset Date: 03/26/24  Number of Treatments Authorized: no prior auth required; 30 visits/ year  Rehab Potential: Excellent  Plan of Care Agreement: Patient    Current Problem  1. Decreased ROM of right shoulder  Referral to Physical Therapy    Follow Up In Physical Therapy      2. Muscle weakness of right upper extremity  Follow Up In Physical Therapy          Subjective   General:  General  Reason for Referral: s/p right shoulder RTC repair, SAD, acromioplasty, open biceps tenodesis (full thickness supraspinatus tear, SLAP tear) 3/26/24  Referred By: Dr Sean Osorio  Past Medical History Relevant to Rehab: [facility] revieweed in [facility] and on Rehab intake form  General Comment: Bony underwent right shoulder surgery on 3/26/24 (arthroscopic RTC, SAD, acromioplasty, open biceps tenodesis).  States he stopped wearing his sling 3 weeks ago (shoulde have been in sling x 6 weeks); and he has returned to work as  a . States he is significantly limiting the use of his right UE. He denies much pain. He saw the DR for follow up  today and they sent him to begin PT.  Precautions:  Precautions  STEADI Fall Risk Score (The score of 4 or more indicates an increased risk of falling): 0  Precautions Comment: Per post op protocol  Pain:  Pain Assessment: 0-10  Pain Score: 2  Pain Type: Surgical pain  Pain Location: Shoulder  Pain Radiating Towards: bicep  Pain Descriptors: Aching, Tightness  Pain Interventions:  (None currently)  Home Living:  Home Living Comment: Lives with wife; reviewed and no concerns.  Prior Function Per Pt/Caregiver Report:  Level of [place]:  (Independent)  Vocational: Full time employment (; working now, but significantly limiting what he does with his right arm. Owns his own business.)  Leisure: Rides motorcycles. Plans to go to [place] the end of July.  Hand Dominance: Right  Prior Function Comments: Active and Independent    Objective      Shoulder    Observation  Shoulder/Scapular/Rib postion: Rounded shoulders; slight scap protraction  Shoulder Observation Comment: Surgical incisions are all healed well; no hypersensistivity or raised appearance of incisions.  Shoulder palpation/Joint Assessment  Shoulder Palpation/Joint Mobility Comment: No focal tenderness over incisions or about the shoulder joint    Shoulder AROM  *Right shoulder not assess due to post op status  L Shoulder flexion: (180°): 170 deg P!  L shoulder ER: (90°): 80 deg P!  L shoulder IR: (70°): 50 deg  Shoulder PROM  L shoulder flexion: (180°): 110 deg  R shoulder abduction: (180°): 80 deg  R shoulder ER: (90°): 456 deg  Shoulder Strength   Right shoulder Not assessed due to post op status  Left UE WFL throughout    Scapular MMT  R lower trapezius: (5/5): 4-  R middle trapezius: (5/5): 4  Good scap depression, and scap retraction    Outcome Measures:  Other Measures  Disability of Arm Shoulder Hand (DASH): 27 (Quick Dash  raw score)     OP EDUCATION:  Outpatient Education  Individual(s) Educated: Patient  Education Provided: Home  Exercise Program, POC, Post-Op Precautions  Risk and Benefits Discussed with Patient/Caregiver/Other: yes  Patient/Caregiver Demonstrated Understanding: yes  Plan of Care Discussed and Agreed Upon: yes  Patient Response to Education: Patient/Caregiver Verbalized Understanding of Information, Patient/Caregiver Performed Return Demonstration of Exercises/Activities, Patient/Caregiver Asked Appropriate Questions  Education Comment: HEP handout issued  Access Code: [email or web address]  URL: [email or web address]  Date: 05/06/2024  Prepared by: Yumiko Cadena    Exercises  - Seated Shoulder Flexion Towel Slide at Table Top Full Range of Motion  - 3 x daily - 7 x weekly - 15 reps - 5 sec hold  - Seated Shoulder Scaption Slide at Table Top with Forearm in Neutral (Mirrored)  - 3 x daily - 7 x weekly - 15 reps - 5 sec hold  - Seated Scapular Retraction  - 3 x daily - 7 x weekly - 15 reps - 5 sec hold  - Standing Scapular Depression  - 3 x daily - 7 x weekly - 15 reps - 5 sec hold  - Standing Backward Shoulder Rolls  - 3 x daily - 7 x weekly - 20 reps    Today's Treatment:   PROM x 15 min (ER, scaption, flexion)  There ex x 10 min:   Table slide flexion 15 x 5 sec  Table slide scaption 15 x 5 sec  Scap retraction 15 x 5 sec  Scap depression 10 x 5 sec  Retro shoulder rolls x 20 - pt education  Reviewed post op precautions for Phase 1; encourage no active motion of right shoulder.     Goals:  Patient Goal:  To regain full motion of shoulder and return to prior activities; including being ready to ride his motorcycle at [place] the end of July.    Physical Therapy Goals:   STGs: (to be met in 6 weeks (12 weeks post op ))  Pain: Will be independent with symptom management strategies and report right shoulder pain no worse than 1-2/10 with beginning AAROM, scap and biceps strengthening.   ROM: P/AAROM improved to at least 150 deg flexion; 140 deg scaption/ abd; 75 deg ER for readiness to progress to AROM.  Strength:  Tolerating scapular exercises for good scap control with AAROM, subsymptom biceps strengthening for return to light use of UE and readiness to begin isometric strengthening.    LTGs: (by discharge)  Pain: Right UE pain 0-1/10 at worst with all activity  ROM: Right shoulder AROM WNL and at least 160 deg flexion, abduction and 75 deg ER for return to PLOF with ADLs, IADLs, work activities.  Strength: Right UE strength 4+-5/5 for return to PLOF at home and work; including overhead activities.  Activity: Quick Dash score <=15 (raw score) for return to PLOF with ADLs, IADLs, and work activities.

## 2024-05-06 NOTE — PROGRESS NOTES
PRIMARY CARE PHYSICIAN: Ernie Chaidez MD    ORTHOPAEDIC POSTOPERATIVE VISIT    ASSESSMENT & PLAN    Impression: 68 y.o. male 6 weeks postop s/p Right shoulder arthroscopy, rotator cuff repair, intra-articular debridement and synovectomy, subacromial decompression and acromioplasty, open subpectoral biceps tenodesis done 03/26/2024.    Plan:   Overall Bony is doing well. He has minimal to no pain. He stopped wearing his sling as instructed approximately 3 weeks ago and has returned to work. I advised patient that he may be doing too much at this time and reiterated the importance of following his post-operative protocol. He will begin working with physical therapy through his post-operative protocol for the above at this time. We discussed his post-operative precautions and he expressed understanding. He will ice and rest the shoulder as he needs and follow up with us in 6 weeks.     I reviewed their postoperative timeline and plan with them. They understand the postoperative precautions and the treatment plan going forward.     Follow-Up: Patient will follow-up in 6 weeks, no x-rays    At the end of the visit, all questions were answered in full. The patient is in agreement with the plan and recommendations. They will call the office with any questions/concerns.      Note dictated with HipLink software. Completed without full typed error editing and sent to avoid delay.      SUBJECTIVE  CHIEF COMPLAINT:   Chief Complaint   Patient presents with    Right Shoulder - Post-op        HPI: Bony Fiore Sr. is a 68 y.o. patient now 6 weeks postop status post Right shoulder arthroscopy, rotator cuff repair, intra-articular debridement and synovectomy, subacromial decompression and acromioplasty, open subpectoral biceps tenodesis done 03/26/2024.  He is doing well overall.  He has minimal to no pain. He presents today without his sling and states he discontinued it approximately 3 weeks ago.  "He states he has been keeping his arm close to his side but has returned to work and has been doing a lot with his right arm. He has no pain only some tightness. No concerns at this time.     REVIEW OF SYSTEMS  Constitutional: See HPI for pain assessment, No significant recent weight gain or loss, no fever or chills  Cardiovascular: No chest pain, shortness of breath  Respiratory: No difficulty breathing, no cough  Gastrointestinal: No nausea, vomiting, diarrhea, constipation  Musculoskeletal: Noted in HPI, positive for pain, restricted motion, stiffness  Integumentary: No rashes, easy bruising or skin lesions  Neurological: No headache, no numbness or tingling in extremities  Psychiatric: No mood changes or memory changes. No social issues  Heme/Lymph: No excessive swelling, easy bruising or excessive bleeding  ENT: No hearing changes, no nosebleeds  Eyes: No vision changes    CURRENT MEDICATIONS:   Current Outpatient Medications   Medication Sig Dispense Refill    cyanocobalamin (Vitamin B-12) 1,000 mcg tablet Take 1 tablet (1,000 mcg) by mouth once daily.      glucosamine-chondroitin 500-400 mg tablet Take 1 tablet by mouth once daily.      multivitamin tablet Take 1 tablet by mouth once daily.      omega 3-dha-epa-fish oil (Fish OiL) 1,000 mg (120 mg-180 mg) capsule Take 1 capsule (1,000 mg) by mouth once daily.      red yeast rice 600 mg capsule Take 600 mg by mouth once daily.      TURMERIC ORAL Take 1 capsule by mouth once daily.       No current facility-administered medications for this visit.        OBJECTIVE    PHYSICAL EXAM      3/26/2024     1:35 PM 3/26/2024     1:45 PM 3/26/2024     3:39 PM 3/26/2024     3:55 PM 3/26/2024     4:15 PM 4/8/2024     7:55 AM 5/6/2024     9:15 AM   Vitals   Systolic 121 104 129 124 125     Diastolic 78 73 75 83 82     Heart Rate 55 57 65 57 58     Temp   35.9 °C (96.6 °F)  36 °C (96.8 °F)     Resp 10 11 14 14 18     Height (in)      1.753 m (5' 9\") 1.753 m (5' 9\")   Weight " (lb)      189 189   BMI      27.91 kg/m2 27.91 kg/m2   BSA (m2)      2.04 m2 2.04 m2   Visit Report      Report Report      Body mass index is 27.91 kg/m².    General: Well-appearing, no acute distress    Skin intact bilateral upper and lower extremities  No erythema  No warmth    Detailed examination of the right shoulder demonstrates:  Surgical incision(s) healing well  No erythema or warmth  No drainage  No ecchymosis  Resolving swelling, minimal  Biceps contour normal  Shoulder range of motion: PROM FF 90, ER 45  Upper extremity motor grossly intact  C5-T1 sensation intact bilaterally  2+ radial pulses bilaterally  Warm and well-perfused, brisk capillary refill    IMAGING:   No new imaging

## 2024-05-10 ENCOUNTER — TREATMENT (OUTPATIENT)
Dept: PHYSICAL THERAPY | Facility: CLINIC | Age: 69
End: 2024-05-10
Payer: COMMERCIAL

## 2024-05-10 DIAGNOSIS — M62.81 MUSCLE WEAKNESS OF RIGHT UPPER EXTREMITY: ICD-10-CM

## 2024-05-10 DIAGNOSIS — M25.611 DECREASED ROM OF RIGHT SHOULDER: ICD-10-CM

## 2024-05-10 PROCEDURE — 97110 THERAPEUTIC EXERCISES: CPT | Mod: GP,CQ

## 2024-05-10 PROCEDURE — 97140 MANUAL THERAPY 1/> REGIONS: CPT | Mod: GP,CQ

## 2024-05-10 ASSESSMENT — PAIN DESCRIPTION - DESCRIPTORS: DESCRIPTORS: TIGHTNESS

## 2024-05-10 ASSESSMENT — PAIN - FUNCTIONAL ASSESSMENT: PAIN_FUNCTIONAL_ASSESSMENT: 0-10

## 2024-05-10 ASSESSMENT — PAIN SCALES - GENERAL: PAINLEVEL_OUTOF10: 0 - NO PAIN

## 2024-05-10 NOTE — PROGRESS NOTES
"Physical Therapy    Physical Therapy Treatment    Patient Name: Bony Fiore Sr.  MRN: [id number]  Today's Date: 5/10/2024   Time Calculation  Start Time: 0800  Stop Time: 0842  Time Calculation (min): 42 min    Assessment:   Verbal cueing needed to not muscle guard with shoulder PROM. Minimal discomfort at end range (tightness). Right shoulder ROM is improving nicely.  Encouraged the pt to not actively lift his shoulder per protocol and for tissue healing purposes.  Plan:   Continue to increase ROM per protocol.    Current Problem  1. Decreased ROM of right shoulder  Follow Up In Physical Therapy      2. Muscle weakness of right upper extremity  Follow Up In Physical Therapy            Subjective    Pt reports that his shoulder/bicep is tight but not painful. Pt has been working as a . Reports that he has nor been lifting anything heavy.    Precautions   Precautions  STEADI Fall Risk Score (The score of 4 or more indicates an increased risk of falling): 0  Precautions Comment: Per post op protocol     Pain       Objective   Reviewed home exercises  Initiated flow sheet    Right shoulder PROM  Flexion 153 deg  Abduction 95 deg  External rotation 50 deg    Treatments:  EXERCISE Date 5/10/24 Date Date Date    #2 REPS REPS REPS          Table slide flexion 15x10\"H      Table slide scaption 15x10\"H      Pulleys      Flexion       Pulleys      Scaption/Abduction       Pulleys      IR              Tband       Pull down       Tband       Mid rows       Tband       IR       Tband       ER                     Wand              I T Y ([place])              Scapular retraction 5\"H x20      Retro shoulder rolls 5\"H x20                    Shoulder PROM 20 min                                  Therex 22                         OP EDUCATION:  Reviewed      Goals:  Physical Therapy Goals:   STGs: (to be met in 6 weeks (12 weeks post op ))  Pain: Will be independent with symptom management strategies and report " right shoulder pain no worse than 1-2/10 with beginning AAROM, scap and biceps strengthening.   ROM: P/AAROM improved to at least 150 deg flexion; 140 deg scaption/ abd; 75 deg ER for readiness to progress to AROM.  Strength: Tolerating scapular exercises for good scap control with AAROM, subsymptom biceps strengthening for return to light use of UE and readiness to begin isometric strengthening.    LTGs: (by discharge)  Pain: Right UE pain 0-1/10 at worst with all activity  ROM: Right shoulder AROM WNL and at least 160 deg flexion, abduction and 75 deg ER for return to PLOF with ADLs, IADLs, work activities.  Strength: Right UE strength 4+-5/5 for return to PLOF at home and work; including overhead activities.  Activity: Quick Dash score <=15 (raw score) for return to PLOF with ADLs, IADLs, and work activities.

## 2024-05-13 ENCOUNTER — TREATMENT (OUTPATIENT)
Dept: PHYSICAL THERAPY | Facility: CLINIC | Age: 69
End: 2024-05-13
Payer: COMMERCIAL

## 2024-05-13 DIAGNOSIS — M25.611 DECREASED ROM OF RIGHT SHOULDER: ICD-10-CM

## 2024-05-13 DIAGNOSIS — M62.81 MUSCLE WEAKNESS OF RIGHT UPPER EXTREMITY: ICD-10-CM

## 2024-05-13 PROCEDURE — 97140 MANUAL THERAPY 1/> REGIONS: CPT | Mod: GP,CQ | Performed by: PHYSICAL THERAPY ASSISTANT

## 2024-05-13 PROCEDURE — 97110 THERAPEUTIC EXERCISES: CPT | Mod: GP,CQ | Performed by: PHYSICAL THERAPY ASSISTANT

## 2024-05-13 ASSESSMENT — PAIN - FUNCTIONAL ASSESSMENT: PAIN_FUNCTIONAL_ASSESSMENT: 0-10

## 2024-05-13 ASSESSMENT — PAIN SCALES - GENERAL: PAINLEVEL_OUTOF10: 0 - NO PAIN

## 2024-05-13 ASSESSMENT — PAIN DESCRIPTION - DESCRIPTORS: DESCRIPTORS: TIGHTNESS

## 2024-05-13 NOTE — PROGRESS NOTES
"Physical Therapy    Physical Therapy Treatment    Patient Name: Bony Fiore Sr.  MRN: [id number]  Today's Date: 5/13/2024  Time Calculation  Start Time: 0800  Stop Time: 0830  Time Calculation (min): 30 min    PT Therapeutic Procedures Time Entry  Manual Therapy Time Entry: 20  Therapeutic Exercise Time Entry: 10        VISIT# 3    Current Problem  1. Decreased ROM of right shoulder  Follow Up In Physical Therapy      2. Muscle weakness of right upper extremity  Follow Up In Physical Therapy          Subjective   Pt voiced no new concerns. Pt reports he is working and limits use of RUE.     Precautions  Precautions  Precautions Comment: Per post op protocol       Pain  Pain Assessment: 0-10  Pain Score: 0 - No pain  Pain Location: Shoulder  Pain Orientation: Right  Pain Descriptors: Tightness       Objective   R shoulder :  Flex  153  Abd 110  ER 60  Treatments:     EXERCISE Date 5/10/24 Date 5/13/24 Date Date    #2 #3            Table slide flexion 15x10\"H 15 x 10\"H     Table slide scaption 15x10\"H 15 x 10\"H     Pulleys      Flexion       Pulleys      Scaption/Abduction       Pulleys      IR              Tband       Pull down       Tband       Mid rows       Tband       IR       Tband       ER                     Wand              I T [facility] ([facility])              Scapular retraction 5\"H x20 5\"H x 20     Retro shoulder rolls 5\"H x20 5\"H x 20                   Shoulder PROM 20 min 20 min                                                            Assessment:  Minimal discomfort at end range (tightness). Right shoulder ROM is improving nicely.  Encouraged the pt to not actively lift his shoulder per protocol and for tissue healing purposes.    OP EDUCATION:       Plan:   Continue to increase ROM per protocol.      Goals:  Patient Goal:  To regain full motion of shoulder and return to prior activities; including being ready to ride his motorcycle at [place] the end of July.    Physical Therapy Goals:   STGs: (to be " met in 6 weeks (12 weeks post op ))  Pain: Will be independent with symptom management strategies and report right shoulder pain no worse than 1-2/10 with beginning AAROM, scap and biceps strengthening.   ROM: P/AAROM improved to at least 150 deg flexion; 140 deg scaption/ abd; 75 deg ER for readiness to progress to AROM.  Strength: Tolerating scapular exercises for good scap control with AAROM, subsymptom biceps strengthening for return to light use of UE and readiness to begin isometric strengthening.    LTGs: (by discharge)  Pain: Right UE pain 0-1/10 at worst with all activity  ROM: Right shoulder AROM WNL and at least 160 deg flexion, abduction and 75 deg ER for return to PLOF with ADLs, IADLs, work activities.  Strength: Right UE strength 4+-5/5 for return to PLOF at home and work; including overhead activities.  Activity: Quick Dash score <=15 (raw score) for return to PLOF with ADLs, IADLs, and work activities.

## 2024-05-15 ENCOUNTER — TREATMENT (OUTPATIENT)
Dept: PHYSICAL THERAPY | Facility: CLINIC | Age: 69
End: 2024-05-15
Payer: COMMERCIAL

## 2024-05-15 DIAGNOSIS — M62.81 MUSCLE WEAKNESS OF RIGHT UPPER EXTREMITY: ICD-10-CM

## 2024-05-15 DIAGNOSIS — M25.611 DECREASED ROM OF RIGHT SHOULDER: ICD-10-CM

## 2024-05-15 PROCEDURE — 97140 MANUAL THERAPY 1/> REGIONS: CPT | Mod: GP,CQ

## 2024-05-15 PROCEDURE — 97110 THERAPEUTIC EXERCISES: CPT | Mod: GP,CQ

## 2024-05-15 ASSESSMENT — PAIN DESCRIPTION - DESCRIPTORS: DESCRIPTORS: TIGHTNESS

## 2024-05-15 ASSESSMENT — PAIN SCALES - GENERAL: PAINLEVEL_OUTOF10: 0 - NO PAIN

## 2024-05-15 ASSESSMENT — PAIN - FUNCTIONAL ASSESSMENT: PAIN_FUNCTIONAL_ASSESSMENT: 0-10

## 2024-05-15 NOTE — PROGRESS NOTES
"Physical Therapy    Physical Therapy Treatment    Patient Name: Bony Fiore Sr.  MRN: [id number]  Today's Date: 5/15/2024  Time Calculation  Start Time: 0800  Stop Time: 0835  Time Calculation (min): 35 min    PT Therapeutic Procedures Time Entry  Manual Therapy Time Entry: 20  Therapeutic Exercise Time Entry: 15        VISIT# 4    Current Problem  1. Decreased ROM of right shoulder  Follow Up In Physical Therapy      2. Muscle weakness of right upper extremity  Follow Up In Physical Therapy          Subjective   Pts shoulder feels tight but is not painful. Reports that yesterday he used his right arm a lot at work. His shoulder was sore afterwards.     Precautions   Precautions  STEADI Fall Risk Score (The score of 4 or more indicates an increased risk of falling): 0  Precautions Comment: Per post op protocol     Pain  Pain Assessment: 0-10  Pain Score: 0 - No pain  Pain Location: Shoulder  Pain Orientation: Right  Pain Descriptors: Tightness       Objective   R shoulder :  Flex  153  Abd 110  ER 60    Treatments:   EXERCISE Date 5/10/24 Date 5/13/24 Date 5/15/24 Date    #2 #3     Table slide ER   15x 10\"H    Table slide flexion 15x10\"H 15 x 10\"H 15x 10\"H    Table slide scaption 15x10\"H 15 x 10\"H 15x 10\"H    Pulleys      Flexion       Pulleys      Scaption/Abduction       Pulleys      IR              Tband       Pull down       Tband       Mid rows       Tband       IR       Tband       ER                     Wand              I T Y ([facility])              Scapular retraction 5\"H x20 5\"H x 20 5\"H x20    Retro shoulder rolls 5\"H x20 5\"H x 20 5\"H x20                  Shoulder PROM 20 min 20 min 20 min                                                           Assessment:  Minimal discomfort at end range (tightness). Right shoulder ROM is improving nicely.  Reviewed protocol and encouraged the pt to not actively use his right UE.     Plan:   Continue to increase ROM per protocol.      Goals:  Patient Goal:  To " regain full motion of shoulder and return to prior activities; including being ready to ride his motorcycle at [place] the end of July.    Physical Therapy Goals:   STGs: (to be met in 6 weeks (12 weeks post op ))  Pain: Will be independent with symptom management strategies and report right shoulder pain no worse than 1-2/10 with beginning AAROM, scap and biceps strengthening.   ROM: P/AAROM improved to at least 150 deg flexion; 140 deg scaption/ abd; 75 deg ER for readiness to progress to AROM.  Strength: Tolerating scapular exercises for good scap control with AAROM, subsymptom biceps strengthening for return to light use of UE and readiness to begin isometric strengthening.    LTGs: (by discharge)  Pain: Right UE pain 0-1/10 at worst with all activity  ROM: Right shoulder AROM WNL and at least 160 deg flexion, abduction and 75 deg ER for return to PLOF with ADLs, IADLs, work activities.  Strength: Right UE strength 4+-5/5 for return to PLOF at home and work; including overhead activities.  Activity: Quick Dash score <=15 (raw score) for return to PLOF with ADLs, IADLs, and work activities.

## 2024-05-20 ENCOUNTER — TREATMENT (OUTPATIENT)
Dept: PHYSICAL THERAPY | Facility: CLINIC | Age: 69
End: 2024-05-20
Payer: COMMERCIAL

## 2024-05-20 DIAGNOSIS — M62.81 MUSCLE WEAKNESS OF RIGHT UPPER EXTREMITY: ICD-10-CM

## 2024-05-20 DIAGNOSIS — M25.611 DECREASED ROM OF RIGHT SHOULDER: ICD-10-CM

## 2024-05-20 PROCEDURE — 97140 MANUAL THERAPY 1/> REGIONS: CPT | Mod: GP,CQ | Performed by: PHYSICAL THERAPY ASSISTANT

## 2024-05-20 PROCEDURE — 97110 THERAPEUTIC EXERCISES: CPT | Mod: GP,CQ | Performed by: PHYSICAL THERAPY ASSISTANT

## 2024-05-20 ASSESSMENT — PAIN SCALES - GENERAL: PAINLEVEL_OUTOF10: 0 - NO PAIN

## 2024-05-20 ASSESSMENT — PAIN - FUNCTIONAL ASSESSMENT: PAIN_FUNCTIONAL_ASSESSMENT: 0-10

## 2024-05-20 ASSESSMENT — PAIN DESCRIPTION - DESCRIPTORS: DESCRIPTORS: TIGHTNESS

## 2024-05-20 NOTE — PROGRESS NOTES
"Physical Therapy    Physical Therapy Treatment    Patient Name: Bony Fiore Sr.  MRN: [id number]  Today's Date: 5/20/2024  Time Calculation  Start Time: 0800  Stop Time: 0841  Time Calculation (min): 41 min    PT Therapeutic Procedures Time Entry  Manual Therapy Time Entry: 20  Therapeutic Exercise Time Entry: 21        VISIT# 5    Current Problem  1. Decreased ROM of right shoulder  Follow Up In Physical Therapy      2. Muscle weakness of right upper extremity  Follow Up In Physical Therapy          Subjective      Pt reports no pain, reports using Right shoulder to shovel.  Precautions  Precautions  Precautions Comment: Per post op protocol       Pain  Pain Assessment: 0-10  Pain Score: 0 - No pain  Pain Location: Shoulder  Pain Orientation: Right  Pain Descriptors: Tightness       Objective         Treatments:     EXERCISE Date 5/10/24 Date 5/13/24 Date 5/15/24 Date 5/20/24    #2 #3     Table slide ER   15x 10\"H 15 x 10\"H   Table slide flexion 15x10\"H 15 x 10\"H 15x 10\"H 15 x 10\"H   Table slide scaption 15x10\"H 15 x 10\"H 15x 10\"H 15 x 10\"H   Pulleys      Flexion       Pulleys      Scaption/Abduction       Pulleys      IR              Tband       Pull down       Tband       Mid rows       Tband       IR       Tband       ER                     Wand              [facility]              Scapular retraction 5\"H x20 5\"H x 20 5\"H x20 5\"H x20   Retro shoulder rolls 5\"H x20 5\"H x 20 5\"H x20 5\"H x20                 Shoulder PROM 20 min 20 min 20 min 20 min                                                          Assessment:  Pt reports shoulder feels good when stretched. Right shoulder ROM improving.  Reviewed protocol and encouraged the pt to not actively use his right UE.    OP EDUCATION:       Plan:  Continue to increase ROM per protocol.      Goals:  Patient Goal:  To regain full motion of shoulder and return to prior activities; including being ready to ride his motorcycle at [place] the end of " July.    Physical Therapy Goals:   STGs: (to be met in 6 weeks (12 weeks post op ))  Pain: Will be independent with symptom management strategies and report right shoulder pain no worse than 1-2/10 with beginning AAROM, scap and biceps strengthening.   ROM: P/AAROM improved to at least 150 deg flexion; 140 deg scaption/ abd; 75 deg ER for readiness to progress to AROM.  Strength: Tolerating scapular exercises for good scap control with AAROM, subsymptom biceps strengthening for return to light use of UE and readiness to begin isometric strengthening.    LTGs: (by discharge)  Pain: Right UE pain 0-1/10 at worst with all activity  ROM: Right shoulder AROM WNL and at least 160 deg flexion, abduction and 75 deg ER for return to PLOF with ADLs, IADLs, work activities.  Strength: Right UE strength 4+-5/5 for return to PLOF at home and work; including overhead activities.  Activity: Quick Dash score <=15 (raw score) for return to PLOF with ADLs, IADLs, and work activities.

## 2024-05-22 ENCOUNTER — TREATMENT (OUTPATIENT)
Dept: PHYSICAL THERAPY | Facility: CLINIC | Age: 69
End: 2024-05-22
Payer: COMMERCIAL

## 2024-05-22 DIAGNOSIS — M25.611 DECREASED ROM OF RIGHT SHOULDER: ICD-10-CM

## 2024-05-22 DIAGNOSIS — M62.81 MUSCLE WEAKNESS OF RIGHT UPPER EXTREMITY: ICD-10-CM

## 2024-05-22 PROCEDURE — 97140 MANUAL THERAPY 1/> REGIONS: CPT | Mod: GP,CQ

## 2024-05-22 PROCEDURE — 97110 THERAPEUTIC EXERCISES: CPT | Mod: GP,CQ

## 2024-05-22 ASSESSMENT — PAIN DESCRIPTION - DESCRIPTORS: DESCRIPTORS: SORE

## 2024-05-22 ASSESSMENT — PAIN - FUNCTIONAL ASSESSMENT: PAIN_FUNCTIONAL_ASSESSMENT: 0-10

## 2024-05-22 ASSESSMENT — PAIN SCALES - GENERAL: PAINLEVEL_OUTOF10: 1

## 2024-05-22 NOTE — PROGRESS NOTES
"Physical Therapy    Physical Therapy Treatment    Patient Name: Bony Fiore Sr.  MRN: [id number]  Today's Date: 5/22/2024  Time Calculation  Start Time: 0800  Stop Time: 0835  Time Calculation (min): 35 min    PT Therapeutic Procedures Time Entry  Manual Therapy Time Entry: 20  Therapeutic Exercise Time Entry: 15        VISIT# 6    Current Problem  1. Decreased ROM of right shoulder  Follow Up In Physical Therapy      2. Muscle weakness of right upper extremity  Follow Up In Physical Therapy          Subjective   Pts shoulder is sore because he has been using his arm at work.       Precautions   Precautions  Precautions Comment: Per post op protocol     Pain  Pain Assessment: 0-10  Pain Score: 1  Pain Location: Shoulder  Pain Orientation: Right  Pain Descriptors: Sore       Objective   Right shoulder PROM  Flexion 163 deg  Abduction ~ 135 deg  External rotation 65 deg      Treatments:   EXERCISE Date 5/22/24 Date  Date  Date     #5      Table slide ER       Table slide flexion 15x10\"H      Table slide scaption 15x10\"H      Pulleys      Flexion 10x10\"H      Pulleys      Scaption/Abduction       Pulleys      IR              Tband       Pull down       Tband       Mid rows       Tband       IR       Tband       ER                     Wand              I T Y ([facility])              Scapular retraction 5\"H x30      Retro shoulder rolls 5\"H x30                    Shoulder PROM 20 min                                                             Assessment:  Reviewed protocol and encouraged the pt to not actively use his right UE.  Right shoulder PROM continues to improve.     Plan:  Continue to increase ROM per protocol.      Goals:  Patient Goal:  To regain full motion of shoulder and return to prior activities; including being ready to ride his motorcycle at [place] the end of July.    Physical Therapy Goals:   STGs: (to be met in 6 weeks (12 weeks post op ))  Pain: Will be independent with symptom management " strategies and report right shoulder pain no worse than 1-2/10 with beginning AAROM, scap and biceps strengthening.   ROM: P/AAROM improved to at least 150 deg flexion; 140 deg scaption/ abd; 75 deg ER for readiness to progress to AROM.  Strength: Tolerating scapular exercises for good scap control with AAROM, subsymptom biceps strengthening for return to light use of UE and readiness to begin isometric strengthening.    LTGs: (by discharge)  Pain: Right UE pain 0-1/10 at worst with all activity  ROM: Right shoulder AROM WNL and at least 160 deg flexion, abduction and 75 deg ER for return to PLOF with ADLs, IADLs, work activities.  Strength: Right UE strength 4+-5/5 for return to PLOF at home and work; including overhead activities.  Activity: Quick Dash score <=15 (raw score) for return to PLOF with ADLs, IADLs, and work activities.

## 2024-05-28 ENCOUNTER — APPOINTMENT (OUTPATIENT)
Dept: PHYSICAL THERAPY | Facility: CLINIC | Age: 69
End: 2024-05-28
Payer: COMMERCIAL

## 2024-05-30 ENCOUNTER — TREATMENT (OUTPATIENT)
Dept: PHYSICAL THERAPY | Facility: CLINIC | Age: 69
End: 2024-05-30
Payer: COMMERCIAL

## 2024-05-30 DIAGNOSIS — M62.81 MUSCLE WEAKNESS OF RIGHT UPPER EXTREMITY: ICD-10-CM

## 2024-05-30 DIAGNOSIS — M25.611 DECREASED ROM OF RIGHT SHOULDER: ICD-10-CM

## 2024-05-30 PROCEDURE — 97110 THERAPEUTIC EXERCISES: CPT | Mod: GP,CQ

## 2024-05-30 PROCEDURE — 97140 MANUAL THERAPY 1/> REGIONS: CPT | Mod: GP,CQ

## 2024-05-30 ASSESSMENT — PAIN DESCRIPTION - DESCRIPTORS: DESCRIPTORS: TIGHTNESS

## 2024-05-30 ASSESSMENT — PAIN SCALES - GENERAL: PAINLEVEL_OUTOF10: 0 - NO PAIN

## 2024-05-30 ASSESSMENT — PAIN - FUNCTIONAL ASSESSMENT: PAIN_FUNCTIONAL_ASSESSMENT: 0-10

## 2024-05-30 NOTE — PROGRESS NOTES
"Physical Therapy    Physical Therapy Treatment    Patient Name: Bony Fiore Sr.  MRN: [id number]  Today's Date: 5/30/2024  Time Calculation  Start Time: 0805  Stop Time: 0838  Time Calculation (min): 33 min    PT Therapeutic Procedures Time Entry  Manual Therapy Time Entry: 10  Therapeutic Exercise Time Entry: 23        VISIT# 7    Current Problem  1. Decreased ROM of right shoulder  Follow Up In Physical Therapy      2. Muscle weakness of right upper extremity  Follow Up In Physical Therapy          Subjective   Pts shoulder is tight this morning. No pain. 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       Precautions   Precautions  Precautions Comment: Per post op protocol     Pain  Pain Assessment: 0-10  Pain Score: 0 - No pain  Pain Location: Shoulder  Pain Orientation: Right  Pain Descriptors: Tightness       Objective   Right shoulder PROM  Flexion 163 deg  Abduction ~ 135 deg  External rotation 65 deg    Added bicep curls (3)      Treatments:   EXERCISE Date 5/22/24 Date 5/30/24 Date  Date     #5 #6      Table slide ER 15x15\"H 20x 15\"H     Table slide flexion 15x10\"H 20x 15\"H     Table slide scaption 15x10\"H 20x 15\"H     Pulleys      Flexion       Pulleys      Scaption/Abduction       Pulleys      IR              Tband       Pull down       Tband       Mid rows       Tband       IR       Tband       ER                     Wand              [facility] T Y ([facility])       3 way bicep   0# 30x each     Scapular retraction 5\"H x30 30x 5\"H     Retro shoulder rolls 5\"H x30 30x5\"H                   Shoulder PROM 20 min 10 min                                                            Assessment:  Right shoulder PROM continues to improve. Consider attending PT once a week until able to begin shoulder strengthening program.     Plan:  Continue to increase ROM per protocol.    Reassess next visit.     Goals:  Patient Goal:  To regain full motion of shoulder and return to prior activities; " including being ready to ride his motorcycle at [place] the end of July.    Physical Therapy Goals:   STGs: (to be met in 6 weeks (12 weeks post op ))  Pain: Will be independent with symptom management strategies and report right shoulder pain no worse than 1-2/10 with beginning AAROM, scap and biceps strengthening.   ROM: P/AAROM improved to at least 150 deg flexion; 140 deg scaption/ abd; 75 deg ER for readiness to progress to AROM.  Strength: Tolerating scapular exercises for good scap control with AAROM, subsymptom biceps strengthening for return to light use of UE and readiness to begin isometric strengthening.    LTGs: (by discharge)  Pain: Right UE pain 0-1/10 at worst with all activity  ROM: Right shoulder AROM WNL and at least 160 deg flexion, abduction and 75 deg ER for return to PLOF with ADLs, IADLs, work activities.  Strength: Right UE strength 4+-5/5 for return to PLOF at home and work; including overhead activities.  Activity: Quick Dash score <=15 (raw score) for return to PLOF with ADLs, IADLs, and work activities.

## 2024-06-03 ENCOUNTER — TREATMENT (OUTPATIENT)
Dept: PHYSICAL THERAPY | Facility: CLINIC | Age: 69
End: 2024-06-03
Payer: COMMERCIAL

## 2024-06-03 DIAGNOSIS — M25.611 DECREASED ROM OF RIGHT SHOULDER: ICD-10-CM

## 2024-06-03 DIAGNOSIS — M62.81 MUSCLE WEAKNESS OF RIGHT UPPER EXTREMITY: ICD-10-CM

## 2024-06-03 PROCEDURE — 97110 THERAPEUTIC EXERCISES: CPT | Mod: GP

## 2024-06-03 ASSESSMENT — PAIN - FUNCTIONAL ASSESSMENT: PAIN_FUNCTIONAL_ASSESSMENT: 0-10

## 2024-06-03 ASSESSMENT — PAIN SCALES - GENERAL: PAINLEVEL_OUTOF10: 0 - NO PAIN

## 2024-06-03 NOTE — PROGRESS NOTES
"Physical Therapy    Physical Therapy Treatment/ Recheck    Patient Name: Bony Fiore Sr.  MRN: [id number]    Today's Date: 6/3/2024  Time Calculation  Start Time: 0803  Stop Time: 0845  Time Calculation (min): 42 min     PT Therapeutic Procedures Time Entry  Therapeutic Exercise Time Entry: 42                   Assessment:    Clarified with Bony that the protocol he was given was for a \"large\" tear RTC tear/ repair. Updated protocol that we are following. Began AAROM, and progressed scap stabilization ex's. Will be able to begin AROM in isometric ex in 2 weeks. Is progressing very well for 10 weeks s/p right shoulder RTC repair.     Plan:   Continue 1-2 times/ week x 6 weeks; progress per post op protocol for ROM, strengthening, return to PLOF.    Current Problem  1. Decreased ROM of right shoulder  Follow Up In Physical Therapy      2. Muscle weakness of right upper extremity  Follow Up In Physical Therapy          General     General  General Comment: Having no pain. Is back to work, but works around not be able to do things with his right arm.    Subjective    Precautions  Precautions  STEADI Fall Risk Score (The score of 4 or more indicates an increased risk of falling): 0  Precautions Comment: Per post op protocol- updated today    Pain  Pain Assessment  Pain Assessment: 0-10  Pain Score: 0 - No pain  Pain Type: Surgical pain  Pain Location: Shoulder  Pain Orientation: Right    Objective     Right shoulder AAROM: Flexion: 150 deg, ER 70 deg, Abd (scap) 150 deg  PROM ER to 75 deg, IR to 70 deg    Good scap control with all AAROM above shoulder level    Outcome Measures:  Other Measures  Disability of Arm Shoulder Hand (DASH): 27 (quick dash raw score)  Treatments:   EXERCISE Date 5/22/24 Date 5/30/24 Date 6/3/24 Date     #5 #6  Visit #8    Table slide ER 15x15\"H 20x 15\"H  Home     Table slide flexion 15x10\"H 20x 15\"H  Home    Table slide scaption 15x10\"H 20x 15\"H  Home    Pulleys      Flexion    3 min  " "  Pulleys      Scaption/Abduction    3 min    Pulleys      IR              Finger ladder   15 x 5 sec Step 24           Bent over row for scap stab    3# 2 x 15    Bent over shoulder ext for scap stab    3# 2 x 15           Tband       Pull down       Tband       Mid rows       Tband       IR       Tband       ER                     Wand:  Supine cane flexion  Seated cane ER  Standing cane abduction      20 x 5 sec   15 x 5 sec   15 x 5 sec           I T Y ([facility])       3 way bicep   0# 30x each  3#  palm up 3 x 10    Scapular retraction 5\"H x30 30x 5\"H  home    Retro shoulder rolls 5\"H x30 30x5\"H  home                  Shoulder PROM 20 min 10 min            HEP   Added cane ex to HEP for AAROM      Protocol:           Goals: (as of 6/3/24)  Patient Goal:  To regain full motion of shoulder and return to prior activities; including being ready to ride his motorcycle at [place] the end of July.    Physical Therapy Goals:   STGs: (to be met in 6 weeks (12 weeks post op ))- all met  Pain: Will be independent with symptom management strategies and report right shoulder pain no worse than 1-2/10 with beginning AAROM, scap and biceps strengthening. - met  ROM: P/AAROM improved to at least 150 deg flexion; 140 deg scaption/ abd; 75 deg ER for readiness to progress to AROM.- met  Strength: Tolerating scapular exercises for good scap control with AAROM, subsymptom biceps strengthening for return to light use of UE and readiness to begin isometric strengthening.- met; exhibits good scapular control with AAROM    LTGs: (by discharge) (not yet met; will address in next phases of rehab)  Pain: Right UE pain 0-1/10 at worst with all activity  ROM: Right shoulder AROM WNL and at least 160 deg flexion, abduction and 75 deg ER for return to PLOF with ADLs, IADLs, work activities.  Strength: Right UE strength 4+-5/5 for return to PLOF at home and work; including overhead activities.  Activity: Quick Dash score <=15 (raw score) for " return to PLOF with ADLs, IADLs, and work activities.

## 2024-06-14 ENCOUNTER — TREATMENT (OUTPATIENT)
Dept: PHYSICAL THERAPY | Facility: CLINIC | Age: 69
End: 2024-06-14
Payer: COMMERCIAL

## 2024-06-14 DIAGNOSIS — M62.81 MUSCLE WEAKNESS OF RIGHT UPPER EXTREMITY: ICD-10-CM

## 2024-06-14 DIAGNOSIS — M25.611 DECREASED ROM OF RIGHT SHOULDER: ICD-10-CM

## 2024-06-14 PROCEDURE — 97110 THERAPEUTIC EXERCISES: CPT | Mod: GP,CQ

## 2024-06-14 ASSESSMENT — PAIN - FUNCTIONAL ASSESSMENT: PAIN_FUNCTIONAL_ASSESSMENT: 0-10

## 2024-06-14 ASSESSMENT — PAIN SCALES - GENERAL: PAINLEVEL_OUTOF10: 0 - NO PAIN

## 2024-06-14 NOTE — PROGRESS NOTES
"Physical Therapy    Physical Therapy Treatment/ Recheck    Patient Name: Boyn Fiore Sr.  MRN: [id number]    Today's Date: 6/14/2024  Time Calculation  Start Time: 0850  Stop Time: 0930  Time Calculation (min): 40 min     PT Therapeutic Procedures Time Entry  Therapeutic Exercise Time Entry: 40                   Assessment:    Min/Mod right UE fatigue at the end of his session. Right shoulder ROM is improving nicely.     Plan:   Continue 1-2 times/ week x 6 weeks; progress per post op protocol for ROM, strengthening, return to PLOF.  Add shoulder AROM and isometrics next visit.    Current Problem  1. Decreased ROM of right shoulder  Follow Up In Physical Therapy      2. Muscle weakness of right upper extremity  Follow Up In Physical Therapy          General    Subjective    Doing well. No shoulder pain. Shoulder does get sore if he\"does too much\" at work etc.      Precautions   Precautions  STEADI Fall Risk Score (The score of 4 or more indicates an increased risk of falling): 0  Precautions Comment: Per post op protocol- updated today      Pain  Pain Assessment  Pain Assessment: 0-10  Pain Score: 0 - No pain  Pain Location: Shoulder  Pain Orientation: Right    Objective     Right shoulder AAROM: Flexion: 150 deg, ER 70 deg, Abd (scap) 150 deg  PROM ER to 75 deg, IR to 70 deg    Outcome Measures:   Not today    Treatments:   EXERCISE Date 5/22/24 Date 5/30/24 Date 6/3/24 Date 6/14/24    #5 #6  Visit #8    Table slide ER 15x15\"H 20x 15\"H  Home     Table slide flexion 15x10\"H 20x 15\"H  Home    Table slide scaption 15x10\"H 20x 15\"H  Home    Pulleys      Flexion    3 min 4 min   Pulleys      Scaption/Abduction    3 min 4 min   Pulleys      IR              Finger ladder   15 x 5 sec Step 24 15x 10\"H          Bent over row for scap stab    3# 2 x 15 3# 30x   Bent over shoulder ext for scap stab    3# 2 x 15 3# 30x          Tband       Pull down       Tband       Mid rows       Tband       IR       Tband       ER     " "                Wand:  Supine cane flexion  Seated cane ER  Standing cane abduction      20 x 5 sec   15 x 5 sec   15 x 5 sec   20x5\"H  20x5\"H  20x5\"H          I T Y ([facility])       3 way bicep   0# 30x each  3#  palm up 3 x 10 3# 3x10 all three   Scapular retraction 5\"H x30 30x 5\"H  home    Retro shoulder rolls 5\"H x30 30x5\"H  home                  Shoulder PROM 20 min 10 min            HEP   Added cane ex to HEP for AAROM      Protocol:           Goals: (as of 6/3/24)  Patient Goal:  To regain full motion of shoulder and return to prior activities; including being ready to ride his motorcycle at [place] the end of July.    Physical Therapy Goals:   STGs: (to be met in 6 weeks (12 weeks post op ))- all met  Pain: Will be independent with symptom management strategies and report right shoulder pain no worse than 1-2/10 with beginning AAROM, scap and biceps strengthening. - met  ROM: P/AAROM improved to at least 150 deg flexion; 140 deg scaption/ abd; 75 deg ER for readiness to progress to AROM.- met  Strength: Tolerating scapular exercises for good scap control with AAROM, subsymptom biceps strengthening for return to light use of UE and readiness to begin isometric strengthening.- met; exhibits good scapular control with AAROM    LTGs: (by discharge) (not yet met; will address in next phases of rehab)  Pain: Right UE pain 0-1/10 at worst with all activity  ROM: Right shoulder AROM WNL and at least 160 deg flexion, abduction and 75 deg ER for return to PLOF with ADLs, IADLs, work activities.  Strength: Right UE strength 4+-5/5 for return to PLOF at home and work; including overhead activities.  Activity: Quick Dash score <=15 (raw score) for return to PLOF with ADLs, IADLs, and work activities.  o  "

## 2024-06-17 ENCOUNTER — APPOINTMENT (OUTPATIENT)
Dept: ORTHOPEDIC SURGERY | Facility: CLINIC | Age: 69
End: 2024-06-17
Payer: COMMERCIAL

## 2024-06-17 VITALS — WEIGHT: 189 LBS | HEIGHT: 69 IN | BODY MASS INDEX: 27.99 KG/M2

## 2024-06-17 DIAGNOSIS — M75.101 TEAR OF RIGHT ROTATOR CUFF, UNSPECIFIED TEAR EXTENT, UNSPECIFIED WHETHER TRAUMATIC: Primary | ICD-10-CM

## 2024-06-17 PROCEDURE — 1159F MED LIST DOCD IN RCRD: CPT

## 2024-06-17 PROCEDURE — 1160F RVW MEDS BY RX/DR IN RCRD: CPT

## 2024-06-17 PROCEDURE — 1036F TOBACCO NON-USER: CPT

## 2024-06-17 PROCEDURE — 99024 POSTOP FOLLOW-UP VISIT: CPT

## 2024-06-17 NOTE — PROGRESS NOTES
PRIMARY CARE PHYSICIAN: Ernie Chaidez MD    ORTHOPAEDIC POSTOPERATIVE VISIT    ASSESSMENT & PLAN    Impression: 68 y.o. male 12 weeks postop s/p Right shoulder arthroscopy, rotator cuff repair, intra-articular debridement and synovectomy, subacromial decompression and acromioplasty, open subpectoral biceps tenodesis done 03/26/2024.    Plan:   Overall Bony continues to do well. He has intermittent soreness with increased activity but overall minimal pain. He will continue to work with physical therapy through his post-operative protocol for the above. We discussed his post-operative precautions and he expressed understanding. He will ice and rest the shoulder as he needs. He will follow-up in 3 months.     I reviewed their postoperative timeline and plan with them. They understand the postoperative precautions and the treatment plan going forward.     Follow-Up: Patient will follow-up in 3 months, no x-rays    At the end of the visit, all questions were answered in full. The patient is in agreement with the plan and recommendations. They will call the office with any questions/concerns.      Note dictated with Cuil software. Completed without full typed error editing and sent to avoid delay.      SUBJECTIVE  CHIEF COMPLAINT:   Chief Complaint   Patient presents with    Right Shoulder - Post-op        HPI: Bony Fiore Sr. is a 68 y.o. patient now 12 weeks postop status post Right shoulder arthroscopy, rotator cuff repair, intra-articular debridement and synovectomy, subacromial decompression and acromioplasty, open subpectoral biceps tenodesis done 03/26/2024.  He is doing well overall. He reports intermittent soreness. Bony states he was underhand throwing a baseball to his grandson the other day and felt sore the next day. He continues to work with physical therapy through the post-operative protocol. He would like to discuss his left shoulder at his next post-op appointment.  "    REVIEW OF SYSTEMS  Constitutional: See HPI for pain assessment, No significant recent weight gain or loss, no fever or chills  Cardiovascular: No chest pain, shortness of breath  Respiratory: No difficulty breathing, no cough  Gastrointestinal: No nausea, vomiting, diarrhea, constipation  Musculoskeletal: Noted in HPI, positive for pain, restricted motion, stiffness  Integumentary: No rashes, easy bruising or skin lesions  Neurological: No headache, no numbness or tingling in extremities  Psychiatric: No mood changes or memory changes. No social issues  Heme/Lymph: No excessive swelling, easy bruising or excessive bleeding  ENT: No hearing changes, no nosebleeds  Eyes: No vision changes    CURRENT MEDICATIONS:   Current Outpatient Medications   Medication Sig Dispense Refill    cyanocobalamin (Vitamin B-12) 1,000 mcg tablet Take 1 tablet (1,000 mcg) by mouth once daily.      glucosamine-chondroitin 500-400 mg tablet Take 1 tablet by mouth once daily.      multivitamin tablet Take 1 tablet by mouth once daily.      omega 3-dha-epa-fish oil (Fish OiL) 1,000 mg (120 mg-180 mg) capsule Take 1 capsule (1,000 mg) by mouth once daily.      red yeast rice 600 mg capsule Take 600 mg by mouth once daily.      TURMERIC ORAL Take 1 capsule by mouth once daily.       No current facility-administered medications for this visit.        OBJECTIVE    PHYSICAL EXAM      3/26/2024     1:45 PM 3/26/2024     3:39 PM 3/26/2024     3:55 PM 3/26/2024     4:15 PM 4/8/2024     7:55 AM 5/6/2024     9:15 AM 6/17/2024     9:06 AM   Vitals   Systolic 104 129 124 125      Diastolic 73 75 83 82      Heart Rate 57 65 57 58      Temp  35.9 °C (96.6 °F)  36 °C (96.8 °F)      Resp 11 14 14 18      Height (in)     1.753 m (5' 9\") 1.753 m (5' 9\") 1.753 m (5' 9\")   Weight (lb)     189 189 189   BMI     27.91 kg/m2 27.91 kg/m2 27.91 kg/m2   BSA (m2)     2.04 m2 2.04 m2 2.04 m2   Visit Report     Report Report Report      Body mass index is 27.91 " kg/m².    General: Well-appearing, no acute distress    Skin intact bilateral upper and lower extremities  No erythema  No warmth    Detailed examination of the right shoulder demonstrates:  Surgical incision(s) healing well  No erythema or warmth  No drainage  No ecchymosis  Resolving swelling, minimal  Biceps contour normal  Shoulder range of motion: AROM , ER 45  Upper extremity motor grossly intact  C5-T1 sensation intact bilaterally  2+ radial pulses bilaterally  Warm and well-perfused, brisk capillary refill    IMAGING:   No new imaging

## 2024-06-19 ENCOUNTER — TREATMENT (OUTPATIENT)
Dept: PHYSICAL THERAPY | Facility: CLINIC | Age: 69
End: 2024-06-19
Payer: COMMERCIAL

## 2024-06-19 DIAGNOSIS — M25.611 DECREASED ROM OF RIGHT SHOULDER: ICD-10-CM

## 2024-06-19 DIAGNOSIS — M62.81 MUSCLE WEAKNESS OF RIGHT UPPER EXTREMITY: ICD-10-CM

## 2024-06-19 PROCEDURE — 97110 THERAPEUTIC EXERCISES: CPT | Mod: GP,CQ

## 2024-06-19 ASSESSMENT — PAIN SCALES - GENERAL: PAINLEVEL_OUTOF10: 0 - NO PAIN

## 2024-06-19 ASSESSMENT — PAIN - FUNCTIONAL ASSESSMENT: PAIN_FUNCTIONAL_ASSESSMENT: 0-10

## 2024-06-19 NOTE — PROGRESS NOTES
"Physical Therapy    Physical Therapy Treatment/ Recheck    Patient Name: Bony Fiore Sr.  MRN: [id number]    Today's Date: 6/19/2024  Time Calculation  Start Time: 0845  Stop Time: 0928  Time Calculation (min): 43 min     PT Therapeutic Procedures Time Entry  Therapeutic Exercise Time Entry: 43               Assessment:    Min/mod verbal cueing needed to perform added exercises with the correct technique and form. Reports no increase in pain at the end of his session. Right shoulder ROM continues to improve.    Plan:   Continue 1-2 times/ week x 6 weeks; progress per post op protocol for ROM, strengthening, return to PLOF.      Current Problem  1. Decreased ROM of right shoulder  Follow Up In Physical Therapy      2. Muscle weakness of right upper extremity  Follow Up In Physical Therapy          General    Subjective    Pt reports that his shoulder is sore this morning.      Precautions   Precautions  STEADI Fall Risk Score (The score of 4 or more indicates an increased risk of falling): 0  Precautions Comment: Per post op protocol- updated today      Pain  Pain Assessment  Pain Assessment: 0-10  Pain Score: 0 - No pain  Pain Location: Shoulder  Pain Orientation: Right    Objective     Right shoulder AAROM: Flexion: 150 deg, ER 70 deg, Abd (scap) 150 deg  PROM ER to 75 deg, IR to 70 deg    Added shoulder isometrics and AROM flexion, abduction and external rotation (anti gravity)    Outcome Measures:   Not today    Treatments:   EXERCISE Date 6/19/24 Date  Date  Date     Visit #10                           Pulleys      Flexion 3 min      Pulleys      Scaption/Abduction 3 min      Pulleys      IR              Finger ladder 10x10\"H             Bent over row for scap stab 3# 2x20      Bent over shoulder ext for scap stab 3# 2x20             Tband       Pull down       Tband       Mid rows       Tband       IR       Tband       ER              Shoulder isometrics (4) 10x5\"H      Wand:  Seated  cane flexion  Seated " "cane ER  Standing cane abduction   20x 5\"H  20x 5\"H  20x                    3 way bicep  3# 30x each      Supine flexion AROM 0# 2x10      S/L  abduction AROM 0# 2x10      S/L external rotation  0# 2x10             Shoulder PROM              HEP         Protocol:           Goals: (as of 6/3/24)  Patient Goal:  To regain full motion of shoulder and return to prior activities; including being ready to ride his motorcycle at [place] the end of July.    Physical Therapy Goals:   STGs: (to be met in 6 weeks (12 weeks post op ))- all met  Pain: Will be independent with symptom management strategies and report right shoulder pain no worse than 1-2/10 with beginning AAROM, scap and biceps strengthening. - met  ROM: P/AAROM improved to at least 150 deg flexion; 140 deg scaption/ abd; 75 deg ER for readiness to progress to AROM.- met  Strength: Tolerating scapular exercises for good scap control with AAROM, subsymptom biceps strengthening for return to light use of UE and readiness to begin isometric strengthening.- met; exhibits good scapular control with AAROM    LTGs: (by discharge) (not yet met; will address in next phases of rehab)  Pain: Right UE pain 0-1/10 at worst with all activity  ROM: Right shoulder AROM WNL and at least 160 deg flexion, abduction and 75 deg ER for return to PLOF with ADLs, IADLs, work activities.  Strength: Right UE strength 4+-5/5 for return to PLOF at home and work; including overhead activities.  Activity: Quick Dash score <=15 (raw score) for return to PLOF with ADLs, IADLs, and work activities.  o  "

## 2024-06-28 ENCOUNTER — APPOINTMENT (OUTPATIENT)
Dept: PHYSICAL THERAPY | Facility: CLINIC | Age: 69
End: 2024-06-28
Payer: COMMERCIAL

## 2024-07-03 ENCOUNTER — APPOINTMENT (OUTPATIENT)
Dept: PHYSICAL THERAPY | Facility: CLINIC | Age: 69
End: 2024-07-03
Payer: COMMERCIAL

## 2024-07-05 ENCOUNTER — APPOINTMENT (OUTPATIENT)
Dept: PHYSICAL THERAPY | Facility: CLINIC | Age: 69
End: 2024-07-05
Payer: COMMERCIAL

## 2024-09-09 ENCOUNTER — APPOINTMENT (OUTPATIENT)
Dept: ORTHOPEDIC SURGERY | Facility: CLINIC | Age: 69
End: 2024-09-09
Payer: COMMERCIAL

## 2024-09-09 ENCOUNTER — HOSPITAL ENCOUNTER (OUTPATIENT)
Dept: RADIOLOGY | Facility: CLINIC | Age: 69
Discharge: HOME | End: 2024-09-09
Payer: COMMERCIAL

## 2024-09-09 VITALS — WEIGHT: 189 LBS | BODY MASS INDEX: 27.99 KG/M2 | HEIGHT: 69 IN

## 2024-09-09 DIAGNOSIS — M25.512 LEFT SHOULDER PAIN, UNSPECIFIED CHRONICITY: ICD-10-CM

## 2024-09-09 DIAGNOSIS — M75.102 TEAR OF LEFT ROTATOR CUFF, UNSPECIFIED TEAR EXTENT, UNSPECIFIED WHETHER TRAUMATIC: Primary | ICD-10-CM

## 2024-09-09 PROCEDURE — 1036F TOBACCO NON-USER: CPT | Performed by: STUDENT IN AN ORGANIZED HEALTH CARE EDUCATION/TRAINING PROGRAM

## 2024-09-09 PROCEDURE — 99214 OFFICE O/P EST MOD 30 MIN: CPT | Performed by: STUDENT IN AN ORGANIZED HEALTH CARE EDUCATION/TRAINING PROGRAM

## 2024-09-09 PROCEDURE — 1159F MED LIST DOCD IN RCRD: CPT | Performed by: STUDENT IN AN ORGANIZED HEALTH CARE EDUCATION/TRAINING PROGRAM

## 2024-09-09 PROCEDURE — 3008F BODY MASS INDEX DOCD: CPT | Performed by: STUDENT IN AN ORGANIZED HEALTH CARE EDUCATION/TRAINING PROGRAM

## 2024-09-09 PROCEDURE — 1160F RVW MEDS BY RX/DR IN RCRD: CPT | Performed by: STUDENT IN AN ORGANIZED HEALTH CARE EDUCATION/TRAINING PROGRAM

## 2024-09-09 PROCEDURE — 73030 X-RAY EXAM OF SHOULDER: CPT | Mod: LT

## 2024-09-09 PROCEDURE — 73030 X-RAY EXAM OF SHOULDER: CPT | Mod: LEFT SIDE | Performed by: RADIOLOGY

## 2024-09-09 PROCEDURE — 1125F AMNT PAIN NOTED PAIN PRSNT: CPT | Performed by: STUDENT IN AN ORGANIZED HEALTH CARE EDUCATION/TRAINING PROGRAM

## 2024-09-09 ASSESSMENT — PAIN SCALES - GENERAL: PAINLEVEL_OUTOF10: 2

## 2024-09-09 ASSESSMENT — PAIN - FUNCTIONAL ASSESSMENT: PAIN_FUNCTIONAL_ASSESSMENT: NO/DENIES PAIN

## 2024-09-09 ASSESSMENT — PAIN DESCRIPTION - DESCRIPTORS: DESCRIPTORS: DISCOMFORT

## 2024-09-09 NOTE — PROGRESS NOTES
PRIMARY CARE PHYSICIAN: Ernie Chaidez MD    ORTHOPAEDIC POSTOPERATIVE VISIT    ASSESSMENT & PLAN    Impression: 69 y.o. male 5.5 months postop s/p Right shoulder arthroscopy, rotator cuff repair, intra-articular debridement and synovectomy, subacromial decompression and acromioplasty, open subpectoral biceps tenodesis done 03/26/2024 with a new complaint of left shoulder pain and dysfunction concerning for a rotator cuff tear.     Plan:   Overall Bony continues to do well. He has minimal to no pain in the right shoulder and feels he has progressed well with physical therapy. He continues to work on his exercises on his own, and may progress his strengthening as he tolerates. We discussed his post-operative precautions and he expressed understanding. He will follow up with us for the right shoulder as needed.I reviewed their postoperative timeline and plan with them. They understand the postoperative precautions and the treatment plan going forward.     Regarding the left shoulder, I explained to the patient the nature of their diagnosis.  I reviewed their imaging studies with them. Based on the history, physical exam and imaging studies above, the patient's presentation is consistent with the above diagnosis.  I had a long discussion with the patient regarding their presentation and the treatment options.  We discussed initial nonoperative versus operative management options as well as potential further diagnostic imaging. Patient's symptoms and physical exam findings are consistent with a rotator cuff tear of the left shoulder. He has persistent pain and dysfunction of the left shoulder despite extensive conservative management including physical therapy while rehabilitating the right shoulder for greater than 6 weeks, activity modification, oral anti-inflammatories and rest. At this time I recommend proceeding with further diagnostic imaging with an MRI of the left shoulder. The MRI is medically necessary and  indicated given his subjective complaints and physical exam findings as described below . The MRI will be used for potential presurgical planning purposes. The MRI should be performed in a hospital setting so the surgeon has immediate access to the images.     Follow-Up: Patient will follow-up once the MRI is completed to review the images and discuss a treatment plan going forward    At the end of the visit, all questions were answered in full. The patient is in agreement with the plan and recommendations. They will call the office with any questions/concerns.      Note dictated with [employer] software. Completed without full typed error editing and sent to avoid delay.      SUBJECTIVE  CHIEF COMPLAINT:   Chief Complaint   Patient presents with    Right Shoulder - Follow-up, Post-op    Left Shoulder - Pain        HPI: Bony Fiore SrMel is a 69 y.o. patient now 5.5 months postop status post Right shoulder arthroscopy, rotator cuff repair, intra-articular debridement and synovectomy, subacromial decompression and acromioplasty, open subpectoral biceps tenodesis done 03/26/2024. He has completed physical therapy. He is overall doing well and has no concerns.     He is here today for left shoulder pain which has been going on for many years. No known injury. However recently his pain and discomfort has worsened in the anterior shoulder, radiating down the biceps over the last 6-8 weeks. He described a popping and catching of the left shoulder. He works as a  and states his pain is worse at work / overhead lifting. No Hx of Sx, injury, or injections. No diabetes. No numbness/tingling. No associated neck pain. His pain and dysfunction are now affecting ADL. He has been working with physical therapy on the left shoulder while rehabilitating the right shoulder post-operatively but continues to have significant pain and dysfunction. He has also tried rest, ice, activity modification and oral  "anti-inflammatories without much relief.       REVIEW OF SYSTEMS  Constitutional: See HPI for pain assessment, No significant recent weight gain or loss, no fever or chills  Cardiovascular: No chest pain, shortness of breath  Respiratory: No difficulty breathing, no cough  Gastrointestinal: No nausea, vomiting, diarrhea, constipation  Musculoskeletal: Noted in HPI, positive for pain, restricted motion, stiffness  Integumentary: No rashes, easy bruising or skin lesions  Neurological: No headache, no numbness or tingling in extremities  Psychiatric: No mood changes or memory changes. No social issues  Heme/Lymph: No excessive swelling, easy bruising or excessive bleeding  ENT: No hearing changes, no nosebleeds  Eyes: No vision changes    CURRENT MEDICATIONS:   Current Outpatient Medications   Medication Sig Dispense Refill    cyanocobalamin (Vitamin B-12) 1,000 mcg tablet Take 1 tablet (1,000 mcg) by mouth once daily.      glucosamine-chondroitin 500-400 mg tablet Take 1 tablet by mouth once daily.      multivitamin tablet Take 1 tablet by mouth once daily.      omega 3-dha-epa-fish oil (Fish OiL) 1,000 mg (120 mg-180 mg) capsule Take 1 capsule (1,000 mg) by mouth once daily.      red yeast rice 600 mg capsule Take 600 mg by mouth once daily.      TURMERIC ORAL Take 1 capsule by mouth once daily.       No current facility-administered medications for this visit.        OBJECTIVE    PHYSICAL EXAM      3/26/2024     3:39 PM 3/26/2024     3:55 PM 3/26/2024     4:15 PM 4/8/2024     7:55 AM 5/6/2024     9:15 AM 6/17/2024     9:06 AM 9/9/2024     8:34 AM   Vitals   Systolic 129 124 125       Diastolic 75 83 82       Heart Rate 65 57 58       Temp 35.9 °C (96.6 °F)  36 °C (96.8 °F)       Resp 14 14 18       Height (in)    1.753 m (5' 9\") 1.753 m (5' 9\") 1.753 m (5' 9\") 1.753 m (5' 9\")   Weight (lb)    189 189 189 189   BMI    27.91 kg/m2 27.91 kg/m2 27.91 kg/m2 27.91 kg/m2   BSA (m2)    2.04 m2 2.04 m2 2.04 m2 2.04 m2 "   Visit Report    Report Report Report Report      Body mass index is 27.91 kg/m².    General: Well-appearing, no acute distress    Skin intact bilateral upper and lower extremities  No erythema  No warmth    Detailed examination of the right shoulder demonstrates:  Surgical incision(s) healing well  No erythema or warmth  No drainage  No ecchymosis  Resolving swelling, minimal  Biceps contour normal  Shoulder range of motion: AROM , ER 45  Upper extremity motor grossly intact  C5-T1 sensation intact bilaterally  2+ radial pulses bilaterally  Warm and well-perfused, brisk capillary refill    Laterality: left Shoulder Exam  - Negative Spurlings, full painless neck ROM  - Skin intact  - No erythema or warmth  - No ecchymosis or soft tissue swelling  - Shoulder ROM: Forward flexion 140, ER 40  - Strength:      Jobes 4+/5     ER 5-/5     Belly press and bearhug 5-/5  - Palpation: positive TTP subacromial space and biceps groove  - Chiu and Bassem positive  - Speeds and O'Briens positive    NEUROVASCULAR:  - Neurovascular Status: sensation intact to light touch distally, upper extremity motor grossly intact  - Capillary refill brisk at extremities, Bilateral peripheral pulses 2+    Imaging: Multiple views of the affected left shoulder(s) demonstrate: no acute osseous abnormality, no fracture, no significant degenerative changes.   X-rays were personally reviewed and interpreted by me.  Radiology reports were reviewed by me as well, if readily available at the time.

## 2024-09-18 ENCOUNTER — APPOINTMENT (OUTPATIENT)
Dept: ORTHOPEDIC SURGERY | Facility: CLINIC | Age: 69
End: 2024-09-18
Payer: COMMERCIAL

## 2024-09-26 ENCOUNTER — HOSPITAL ENCOUNTER (OUTPATIENT)
Dept: RADIOLOGY | Facility: CLINIC | Age: 69
Discharge: HOME | End: 2024-09-26
Payer: COMMERCIAL

## 2024-09-26 DIAGNOSIS — M75.102 TEAR OF LEFT ROTATOR CUFF, UNSPECIFIED TEAR EXTENT, UNSPECIFIED WHETHER TRAUMATIC: ICD-10-CM

## 2024-09-26 PROCEDURE — 73221 MRI JOINT UPR EXTREM W/O DYE: CPT | Mod: LEFT SIDE | Performed by: RADIOLOGY

## 2024-09-26 PROCEDURE — 73221 MRI JOINT UPR EXTREM W/O DYE: CPT | Mod: LT

## 2024-10-02 ENCOUNTER — APPOINTMENT (OUTPATIENT)
Dept: ORTHOPEDIC SURGERY | Facility: CLINIC | Age: 69
End: 2024-10-02
Payer: COMMERCIAL

## 2024-10-02 VITALS — BODY MASS INDEX: 28.14 KG/M2 | WEIGHT: 190 LBS | HEIGHT: 69 IN

## 2024-10-02 DIAGNOSIS — M25.812 SHOULDER IMPINGEMENT, LEFT: ICD-10-CM

## 2024-10-02 DIAGNOSIS — S46.012D TRAUMATIC COMPLETE TEAR OF LEFT ROTATOR CUFF, SUBSEQUENT ENCOUNTER: Primary | ICD-10-CM

## 2024-10-02 DIAGNOSIS — M65.912 SYNOVITIS OF LEFT SHOULDER: ICD-10-CM

## 2024-10-02 DIAGNOSIS — M75.102 TEAR OF LEFT ROTATOR CUFF, UNSPECIFIED TEAR EXTENT, UNSPECIFIED WHETHER TRAUMATIC: ICD-10-CM

## 2024-10-02 DIAGNOSIS — S43.432A SUPERIOR LABRUM ANTERIOR-TO-POSTERIOR (SLAP) TEAR OF LEFT SHOULDER: ICD-10-CM

## 2024-10-02 DIAGNOSIS — M75.22 BICEPS TENDINITIS, LEFT: ICD-10-CM

## 2024-10-02 PROCEDURE — 1036F TOBACCO NON-USER: CPT | Performed by: STUDENT IN AN ORGANIZED HEALTH CARE EDUCATION/TRAINING PROGRAM

## 2024-10-02 PROCEDURE — 99214 OFFICE O/P EST MOD 30 MIN: CPT | Performed by: STUDENT IN AN ORGANIZED HEALTH CARE EDUCATION/TRAINING PROGRAM

## 2024-10-02 PROCEDURE — 3008F BODY MASS INDEX DOCD: CPT | Performed by: STUDENT IN AN ORGANIZED HEALTH CARE EDUCATION/TRAINING PROGRAM

## 2024-10-02 PROCEDURE — L3670 SO ACRO/CLAV CAN WEB PRE OTS: HCPCS | Performed by: STUDENT IN AN ORGANIZED HEALTH CARE EDUCATION/TRAINING PROGRAM

## 2024-10-02 PROCEDURE — 1160F RVW MEDS BY RX/DR IN RCRD: CPT | Performed by: STUDENT IN AN ORGANIZED HEALTH CARE EDUCATION/TRAINING PROGRAM

## 2024-10-02 PROCEDURE — 1159F MED LIST DOCD IN RCRD: CPT | Performed by: STUDENT IN AN ORGANIZED HEALTH CARE EDUCATION/TRAINING PROGRAM

## 2024-10-02 ASSESSMENT — PAIN SCALES - GENERAL: PAINLEVEL_OUTOF10: 5 - MODERATE PAIN

## 2024-10-02 ASSESSMENT — PAIN - FUNCTIONAL ASSESSMENT: PAIN_FUNCTIONAL_ASSESSMENT: 0-10

## 2024-10-02 NOTE — PROGRESS NOTES
PRIMARY CARE PHYSICIAN: Ernie Chaidez MD    ORTHOPAEDIC FOLLOW-UP: Shoulder Evaluation    ASSESSMENT & PLAN    Impression: 69 y.o. male with left shoulder full-thickness rotator cuff tear involving the supraspinatus and upper border subscapularis, SLAP tear, long head of the biceps tendon rupture, subacromial impingement/bursitis, glenohumeral synovitis.    Plan:   I reviewed with the patient the nature of their diagnosis.  I reviewed their imaging studies with them.    Based on the history, physical exam and imaging studies above, the patient's presentation is consistent with the above diagnosis.  I had a long discussion with the patient regarding their presentation and the treatment options.  We discussed initial nonoperative versus operative management options as well as potential further diagnostic imaging.  I reviewed the patient's MRI findings with him.  We discussed treatment options going forward.  He has significant pain and dysfunction related to his full-thickness rotator cuff tear.  I do recommend surgical intervention.  We discussed operative and nonoperative management and after long discussion the patient elected to proceed with surgical intervention form of a left shoulder arthroscopy, rotator cuff repair, subacromial decompression and acromioplasty, intra-articular debridement and synovectomy and open subpectoral biceps tenodesis.  I thoroughly explained the risks and benefits as well as the expected postoperative timeline for the proposed procedure versus nonoperative management. Risks of this procedure include but are not limited to bleeding, infection, nerve injury, DVT and failure of repair or implant.  The patient expressed understanding and wished to proceed with surgical intervention.  All questions were answered. We will begin the presurgical process and find them a surgical date in a timely fashion that works for them.    The patient will require an abduction sling for postoperative joint  stability. Additionally, in order to decrease the amount of narcotic pain medication usage and improve their pain control and swelling, I recommend a cryotherapy machine.    At the end of the visit, all questions were answered in full. The patient is in agreement with the plan and recommendations. They will call the office with any questions/concerns.    Note dictated with [employer] software. Completed without full typed error editing and sent to avoid delay.     SUBJECTIVE  CHIEF COMPLAINT:   Chief Complaint   Patient presents with    Left Shoulder - Pain, Follow-up        HPI: Bony Fiore Sr. is a 69 y.o. patient. Bony Fiore Sr. complains of left shoulder pain and dysfunction concerning for a rotator cuff tear. He is following up for a MRI done 9/26/2024.  He has significant pain and dysfunction in the shoulder that affects all of his daily activities.  He would like to discuss surgical treatment options.    9/9/2024 HPI Visit Info:  He is here today for left shoulder pain which has been going on for many years. No known injury. However recently his pain and discomfort has worsened in the anterior shoulder, radiating down the biceps over the last 6-8 weeks. He described a popping and catching of the left shoulder. He works as a  and states his pain is worse at work / overhead lifting. No Hx of Sx, injury, or injections. No diabetes. No numbness/tingling. No associated neck pain. His pain and dysfunction are now affecting ADL. He has been working with physical therapy on the left shoulder while rehabilitating the right shoulder post-operatively but continues to have significant pain and dysfunction. He has also tried rest, ice, activity modification and oral anti-inflammatories without much relief.     REVIEW OF SYSTEMS  Constitutional: See HPI for pain assessment, No significant weight loss, recent trauma  Cardiovascular: No chest pain, shortness of breath  Respiratory:  No difficulty breathing, cough  Gastrointestinal: No nausea, vomiting, diarrhea, constipation  Musculoskeletal: Noted in HPI, positive for pain, restricted motion, stiffness  Integumentary: No rashes, easy bruising, redness   Neurological: no numbness or tingling in extremities, no gait disturbances   Psychiatric: No mood changes, memory changes, social issues  Heme/Lymph: no excessive swelling, easy bruising, excessive bleeding  ENT: No hearing changes  Eyes: No vision changes    Past Medical History:   Diagnosis Date    GERD (gastroesophageal reflux disease)     Other conditions influencing health status     Ulcer        No Known Allergies     Past Surgical History:   Procedure Laterality Date    NASAL SEPTUM SURGERY  10/26/2015    Nasal Septal Deviation Repair        No family history on file.     Social History     Socioeconomic History    Marital status:      Spouse name: Not on file    Number of children: Not on file    Years of education: Not on file    Highest education level: Not on file   Occupational History    Not on file   Tobacco Use    Smoking status: Former     Current packs/day: 0.00     Average packs/day: 1 pack/day for 15.0 years (15.0 ttl pk-yrs)     Types: Cigarettes     Start date:      Quit date:      Years since quittin.7    Smokeless tobacco: Never   Vaping Use    Vaping status: Never Used   Substance and Sexual Activity    Alcohol use: Yes     Alcohol/week: 3.0 standard drinks of alcohol     Types: 3 Cans of beer per week     Comment: 3 beers a week    Drug use: Never    Sexual activity: Not on file   Other Topics Concern    Not on file   Social History Narrative    Not on file     Social Determinants of Health     Financial Resource Strain: Not on file   Food Insecurity: Not on file   Transportation Needs: Not on file   Physical Activity: Not on file   Stress: Not on file   Social Connections: Not on file   Intimate Partner Violence: Not on file   Housing Stability: Not  "on file        CURRENT MEDICATIONS:   Current Outpatient Medications   Medication Sig Dispense Refill    cyanocobalamin (Vitamin B-12) 1,000 mcg tablet Take 1 tablet (1,000 mcg) by mouth once daily.      glucosamine-chondroitin 500-400 mg tablet Take 1 tablet by mouth once daily.      multivitamin tablet Take 1 tablet by mouth once daily.      omega 3-dha-epa-fish oil (Fish OiL) 1,000 mg (120 mg-180 mg) capsule Take 1 capsule (1,000 mg) by mouth once daily.      red yeast rice 600 mg capsule Take 600 mg by mouth once daily.      TURMERIC ORAL Take 1 capsule by mouth once daily.       No current facility-administered medications for this visit.        OBJECTIVE    PHYSICAL EXAM      3/26/2024     3:55 PM 3/26/2024     4:15 PM 4/8/2024     7:55 AM 5/6/2024     9:15 AM 6/17/2024     9:06 AM 9/9/2024     8:34 AM 9/26/2024     7:52 AM   Vitals   Systolic 124 125        Diastolic 83 82        Heart Rate 57 58        Temp  36 °C (96.8 °F)        Resp 14 18        Height (in)   1.753 m (5' 9\") 1.753 m (5' 9\") 1.753 m (5' 9\") 1.753 m (5' 9\") 1.753 m (5' 9\")   Weight (lb)   189 189 189 189 190   BMI   27.91 kg/m2 27.91 kg/m2 27.91 kg/m2 27.91 kg/m2 28.06 kg/m2   BSA (m2)   2.04 m2 2.04 m2 2.04 m2 2.04 m2 2.05 m2   Visit Report   Report Report Report Report       There is no height or weight on file to calculate BMI.    GENERAL: A/Ox3, NAD. Appears healthy, well nourished  PSYCHIATRIC: Mood stable, appropriate memory recall  EYES: EOM intact, no scleral icterus  CARDIOVASCULAR: Palpable peripheral pulses  LUNGS: Breathing non-labored on room air  SKIN: no erythema, rashes, or ecchymosis     MUSCULOSKELETAL:  Laterality: left Shoulder Exam  - Negative Spurlings, full painless neck ROM  - Skin intact  - No erythema or warmth  - No ecchymosis or soft tissue swelling  - Shoulder ROM: Forward flexion 150 with a history 90, ER 40, IR mid lumbar  - Strength:      Jobes 4-/5     ER 4+/5     Belly press and bearhug 4/5  - Palpation: " Positive tenderness biceps groove and posterolateral acromion  - Chiu and Neers positive  - Speeds and O'Briens positive    NEUROVASCULAR:  - Neurovascular Status: sensation intact to light touch distally, upper extremity motor grossly intact  - Capillary refill brisk at extremities, peripheral pulses 2+    Imaging: MRI left shoulder reviewed by me demonstrates a full-thickness rotator cuff tear of the supraspinatus and upper border subscapularis, degenerative SLAP tear, biceps tenosynovitis, subacromial impingement/bursitis and glenohumeral synovitis.  Images were personally reviewed and interpreted by me.  Radiology reports were reviewed by me as well, if readily available at the time.        Sean Osorio MD  Attending Surgeon    Sports Medicine Orthopaedic Surgery  [facility] [place]  [facility] [employer]

## 2024-10-04 PROBLEM — S46.012A TRAUMATIC COMPLETE TEAR OF LEFT ROTATOR CUFF: Status: ACTIVE | Noted: 2024-10-02

## 2024-10-04 PROBLEM — S43.432A SUPERIOR LABRUM ANTERIOR-TO-POSTERIOR (SLAP) TEAR OF LEFT SHOULDER: Status: ACTIVE | Noted: 2024-10-02

## 2024-10-04 PROBLEM — M75.22 BICEPS TENDINITIS, LEFT: Status: ACTIVE | Noted: 2024-10-02

## 2024-10-04 PROBLEM — M65.912 SYNOVITIS OF LEFT SHOULDER: Status: ACTIVE | Noted: 2024-10-02

## 2024-10-04 PROBLEM — M25.812 SHOULDER IMPINGEMENT, LEFT: Status: ACTIVE | Noted: 2024-10-02

## 2024-11-01 ENCOUNTER — TELEPHONE (OUTPATIENT)
Dept: DERMATOLOGY | Facility: CLINIC | Age: 69
End: 2024-11-01
Payer: COMMERCIAL

## 2024-11-20 ENCOUNTER — APPOINTMENT (OUTPATIENT)
Dept: PRIMARY CARE | Facility: CLINIC | Age: 69
End: 2024-11-20
Payer: COMMERCIAL

## 2024-11-20 VITALS
TEMPERATURE: 97 F | BODY MASS INDEX: 29.77 KG/M2 | WEIGHT: 201 LBS | DIASTOLIC BLOOD PRESSURE: 82 MMHG | SYSTOLIC BLOOD PRESSURE: 130 MMHG | HEART RATE: 65 BPM | HEIGHT: 69 IN | OXYGEN SATURATION: 96 %

## 2024-11-20 DIAGNOSIS — Z23 NEEDS FLU SHOT: ICD-10-CM

## 2024-11-20 DIAGNOSIS — R73.01 IFG (IMPAIRED FASTING GLUCOSE): ICD-10-CM

## 2024-11-20 DIAGNOSIS — Z12.5 SCREENING FOR PROSTATE CANCER: Primary | ICD-10-CM

## 2024-11-20 DIAGNOSIS — N40.1 BENIGN PROSTATIC HYPERPLASIA WITH URINARY HESITANCY: ICD-10-CM

## 2024-11-20 DIAGNOSIS — R39.11 BENIGN PROSTATIC HYPERPLASIA WITH URINARY HESITANCY: ICD-10-CM

## 2024-11-20 DIAGNOSIS — Z00.00 MEDICARE ANNUAL WELLNESS VISIT, SUBSEQUENT: ICD-10-CM

## 2024-11-20 DIAGNOSIS — E78.2 MIXED HYPERLIPIDEMIA: ICD-10-CM

## 2024-11-20 PROCEDURE — 1036F TOBACCO NON-USER: CPT | Performed by: FAMILY MEDICINE

## 2024-11-20 PROCEDURE — 1159F MED LIST DOCD IN RCRD: CPT | Performed by: FAMILY MEDICINE

## 2024-11-20 PROCEDURE — G0439 PPPS, SUBSEQ VISIT: HCPCS | Performed by: FAMILY MEDICINE

## 2024-11-20 PROCEDURE — 99214 OFFICE O/P EST MOD 30 MIN: CPT | Performed by: FAMILY MEDICINE

## 2024-11-20 PROCEDURE — 3008F BODY MASS INDEX DOCD: CPT | Performed by: FAMILY MEDICINE

## 2024-11-20 PROCEDURE — 1170F FXNL STATUS ASSESSED: CPT | Performed by: FAMILY MEDICINE

## 2024-11-20 PROCEDURE — G0008 ADMIN INFLUENZA VIRUS VAC: HCPCS | Performed by: FAMILY MEDICINE

## 2024-11-20 PROCEDURE — 1124F ACP DISCUSS-NO DSCNMKR DOCD: CPT | Performed by: FAMILY MEDICINE

## 2024-11-20 PROCEDURE — 90662 IIV NO PRSV INCREASED AG IM: CPT | Performed by: FAMILY MEDICINE

## 2024-11-20 ASSESSMENT — PATIENT HEALTH QUESTIONNAIRE - PHQ9
1. LITTLE INTEREST OR PLEASURE IN DOING THINGS: NOT AT ALL
SUM OF ALL RESPONSES TO PHQ9 QUESTIONS 1 AND 2: 0
2. FEELING DOWN, DEPRESSED OR HOPELESS: NOT AT ALL

## 2024-11-20 ASSESSMENT — ACTIVITIES OF DAILY LIVING (ADL)
GROCERY_SHOPPING: INDEPENDENT
TAKING_MEDICATION: INDEPENDENT
MANAGING_FINANCES: INDEPENDENT
BATHING: INDEPENDENT
DRESSING: INDEPENDENT
DOING_HOUSEWORK: INDEPENDENT

## 2024-11-20 NOTE — PROGRESS NOTES
"Subjective   Reason for Visit: Bony Fiore Sr. is an 69 y.o. male here for a Medicare Wellness visit.          Reviewed all medications by prescribing practitioner or clinical pharmacist (such as prescriptions, OTCs, herbal therapies and supplements) and documented in the medical record.    HPI    Preparing meals - independent  Using telephone - independent  Bladder - continent  Bowel - continent    Recent hospital stays - none    ADLs - able to dress, walk and bath independently  Instrumental ADL's - able to shop, maintain finances, managing medications, housekeeping independently    Depression: PHQ-2 (screening 2 mins) - negative    Opioid use -   none  Exercise -  active every day  Diet - well balanced  Pain score - significant pain in B shoulders    Providers - [facility] / Ortho     [employer] dementia screen - 5/5    Education - educated pt on healthy eating, exercise    Falls - none      Shoulder pain: continuing to have shoulder pain.  Not able to take time off work.  Would be interested in getting more injections.      Counseled pt on living will: encouraged pt to get living will done.    AAA screening: ordered     Prostate CA screen: ordered PSA    CV screening: stress test neg in 2022.    Colonoscopy: neg 2 years ago, fu in 26    Diabetes screening:  checking    Glaucoma screen:  sees annually, has cataract.    CT chest tob screen: NA    Vaccines: ordering flu and Prevnar.  Pt declines COVID.      Patient Care Team:  Ernie Chaidez MD as PCP - General  Ernie Chaidez MD as PCP - Employee [employer] PCP     Review of Systems   All other systems reviewed and are negative.      Objective   Vitals:  /82   Pulse 65   Temp 36.1 °C (97 °F)   Ht 1.753 m (5' 9\")   Wt 91.2 kg (201 lb)   SpO2 96%   BMI 29.68 kg/m²       Physical Exam  Vitals reviewed.   Constitutional:       General: He is not in acute distress.     Appearance: Normal appearance. He is well-developed. He is not diaphoretic.   HENT:      " Head: Normocephalic and atraumatic.      Right Ear: Tympanic membrane normal.      Left Ear: Tympanic membrane normal.      Nose: Nose normal.      Mouth/Throat:      Mouth: Mucous membranes are moist.   Eyes:      Pupils: Pupils are equal, round, and reactive to light.   Cardiovascular:      Rate and Rhythm: Normal rate and regular rhythm.      Heart sounds: Normal heart sounds. No murmur heard.     No friction rub. No gallop.   Pulmonary:      Effort: Pulmonary effort is normal.      Breath sounds: Normal breath sounds. No rales.   Abdominal:      General: Bowel sounds are normal.      Palpations: Abdomen is soft.      Tenderness: There is no abdominal tenderness.   Musculoskeletal:      Cervical back: Normal range of motion and neck supple.   Skin:     General: Skin is warm and dry.   Neurological:      Mental Status: He is alert.   Psychiatric:         Mood and Affect: Mood normal.         Assessment/Plan   Problem List Items Addressed This Visit    None  Visit Diagnoses       Screening for prostate cancer    -  Primary    Relevant Orders    Lipid Panel    Comprehensive Metabolic Panel    CBC and Auto Differential    Prostate Specific Antigen    Hemoglobin A1C    Needs flu shot        Relevant Orders    Flu vaccine, trivalent, preservative free, HIGH-DOSE, age 65y+ (Fluzone) (Completed)    Lipid Panel    Comprehensive Metabolic Panel    CBC and Auto Differential    Hemoglobin A1C    Medicare annual wellness visit, subsequent        Relevant Orders    Lipid Panel    Comprehensive Metabolic Panel    CBC and Auto Differential    Hemoglobin A1C    Benign prostatic hyperplasia with urinary hesitancy        Relevant Orders    Lipid Panel    Comprehensive Metabolic Panel    CBC and Auto Differential    Hemoglobin A1C    IFG (impaired fasting glucose)        Relevant Orders    Lipid Panel    Comprehensive Metabolic Panel    CBC and Auto Differential    Hemoglobin A1C    Mixed hyperlipidemia        Relevant Orders     Lipid Panel          Doing well.  Follow up with Dr. Osorio for his surgery.   Checking BW.  Monitoring BPH sx.  Pt doesn't want treatment yet.

## 2024-12-04 ENCOUNTER — PRE-ADMISSION TESTING (OUTPATIENT)
Dept: PREADMISSION TESTING | Facility: HOSPITAL | Age: 69
End: 2024-12-04
Payer: COMMERCIAL

## 2024-12-04 VITALS
RESPIRATION RATE: 12 BRPM | SYSTOLIC BLOOD PRESSURE: 123 MMHG | HEART RATE: 64 BPM | OXYGEN SATURATION: 100 % | TEMPERATURE: 97.3 F | HEIGHT: 69 IN | WEIGHT: 202.2 LBS | BODY MASS INDEX: 29.95 KG/M2 | DIASTOLIC BLOOD PRESSURE: 81 MMHG

## 2024-12-04 DIAGNOSIS — Z00.00 MEDICARE ANNUAL WELLNESS VISIT, SUBSEQUENT: ICD-10-CM

## 2024-12-04 DIAGNOSIS — N40.1 BENIGN PROSTATIC HYPERPLASIA WITH URINARY HESITANCY: ICD-10-CM

## 2024-12-04 DIAGNOSIS — R39.11 BENIGN PROSTATIC HYPERPLASIA WITH URINARY HESITANCY: ICD-10-CM

## 2024-12-04 DIAGNOSIS — Z23 NEEDS FLU SHOT: ICD-10-CM

## 2024-12-04 DIAGNOSIS — E78.2 MIXED HYPERLIPIDEMIA: ICD-10-CM

## 2024-12-04 DIAGNOSIS — R73.01 IFG (IMPAIRED FASTING GLUCOSE): ICD-10-CM

## 2024-12-04 DIAGNOSIS — Z12.5 SCREENING FOR PROSTATE CANCER: ICD-10-CM

## 2024-12-04 DIAGNOSIS — M75.102 TEAR OF LEFT ROTATOR CUFF, UNSPECIFIED TEAR EXTENT, UNSPECIFIED WHETHER TRAUMATIC: ICD-10-CM

## 2024-12-04 LAB
ALBUMIN SERPL BCP-MCNC: 4.2 G/DL (ref 3.4–5)
ALP SERPL-CCNC: 44 U/L (ref 33–136)
ALT SERPL W P-5'-P-CCNC: 20 U/L (ref 10–52)
ANION GAP SERPL CALC-SCNC: 12 MMOL/L (ref 10–20)
AST SERPL W P-5'-P-CCNC: 20 U/L (ref 9–39)
BASOPHILS # BLD AUTO: 0.03 X10*3/UL (ref 0–0.1)
BASOPHILS NFR BLD AUTO: 0.7 %
BILIRUB SERPL-MCNC: 0.4 MG/DL (ref 0–1.2)
BUN SERPL-MCNC: 23 MG/DL (ref 6–23)
CALCIUM SERPL-MCNC: 9.5 MG/DL (ref 8.6–10.3)
CHLORIDE SERPL-SCNC: 107 MMOL/L (ref 98–107)
CHOLEST SERPL-MCNC: 208 MG/DL (ref 0–199)
CHOLESTEROL/HDL RATIO: 4.2
CO2 SERPL-SCNC: 23 MMOL/L (ref 21–32)
CREAT SERPL-MCNC: 0.84 MG/DL (ref 0.5–1.3)
EGFRCR SERPLBLD CKD-EPI 2021: >90 ML/MIN/1.73M*2
EOSINOPHIL # BLD AUTO: 0.32 X10*3/UL (ref 0–0.7)
EOSINOPHIL NFR BLD AUTO: 6.9 %
ERYTHROCYTE [DISTWIDTH] IN BLOOD BY AUTOMATED COUNT: 12 % (ref 11.5–14.5)
GLUCOSE SERPL-MCNC: 97 MG/DL (ref 74–99)
HCT VFR BLD AUTO: 45 % (ref 41–52)
HDLC SERPL-MCNC: 49.3 MG/DL
HGB BLD-MCNC: 14.9 G/DL (ref 13.5–17.5)
IMM GRANULOCYTES # BLD AUTO: 0.01 X10*3/UL (ref 0–0.7)
IMM GRANULOCYTES NFR BLD AUTO: 0.2 % (ref 0–0.9)
LDLC SERPL CALC-MCNC: 104 MG/DL
LYMPHOCYTES # BLD AUTO: 1.27 X10*3/UL (ref 1.2–4.8)
LYMPHOCYTES NFR BLD AUTO: 27.5 %
MCH RBC QN AUTO: 32.4 PG (ref 26–34)
MCHC RBC AUTO-ENTMCNC: 33.1 G/DL (ref 32–36)
MCV RBC AUTO: 98 FL (ref 80–100)
MONOCYTES # BLD AUTO: 0.46 X10*3/UL (ref 0.1–1)
MONOCYTES NFR BLD AUTO: 10 %
NEUTROPHILS # BLD AUTO: 2.52 X10*3/UL (ref 1.2–7.7)
NEUTROPHILS NFR BLD AUTO: 54.7 %
NON HDL CHOLESTEROL: 159 MG/DL (ref 0–149)
NRBC BLD-RTO: NORMAL /100{WBCS}
PLATELET # BLD AUTO: 186 X10*3/UL (ref 150–450)
POTASSIUM SERPL-SCNC: 4.4 MMOL/L (ref 3.5–5.3)
PROT SERPL-MCNC: 6.6 G/DL (ref 6.4–8.2)
RBC # BLD AUTO: 4.6 X10*6/UL (ref 4.5–5.9)
SODIUM SERPL-SCNC: 138 MMOL/L (ref 136–145)
TRIGL SERPL-MCNC: 273 MG/DL (ref 0–149)
VLDL: 55 MG/DL (ref 0–40)
WBC # BLD AUTO: 4.6 X10*3/UL (ref 4.4–11.3)

## 2024-12-04 PROCEDURE — 99203 OFFICE O/P NEW LOW 30 MIN: CPT

## 2024-12-04 PROCEDURE — 80061 LIPID PANEL: CPT | Mod: BEALAB,WESLAB

## 2024-12-04 PROCEDURE — 85025 COMPLETE CBC W/AUTO DIFF WBC: CPT

## 2024-12-04 PROCEDURE — 36415 COLL VENOUS BLD VENIPUNCTURE: CPT

## 2024-12-04 PROCEDURE — 84075 ASSAY ALKALINE PHOSPHATASE: CPT

## 2024-12-04 PROCEDURE — 83036 HEMOGLOBIN GLYCOSYLATED A1C: CPT | Mod: BEALAB

## 2024-12-04 RX ORDER — FEXOFENADINE HCL AND PSEUDOEPHEDRINE HCI 60; 120 MG/1; MG/1
1 TABLET, EXTENDED RELEASE ORAL 2 TIMES DAILY PRN
COMMUNITY

## 2024-12-04 ASSESSMENT — DUKE ACTIVITY SCORE INDEX (DASI)
CAN YOU DO YARD WORK LIKE RAKING LEAVES, WEEDING OR PUSHING A MOWER: YES
CAN YOU RUN A SHORT DISTANCE: YES
CAN YOU TAKE CARE OF YOURSELF (EAT, DRESS, BATHE, OR USE TOILET): YES
CAN YOU DO MODERATE WORK AROUND THE HOUSE LIKE VACUUMING, SWEEPING FLOORS OR CARRYING GROCERIES: YES
CAN YOU DO HEAVY WORK AROUND THE HOUSE LIKE SCRUBBING FLOORS OR LIFTING AND MOVING HEAVY FURNITURE: YES
TOTAL_SCORE: 50.7
CAN YOU PARTICIPATE IN MODERATE RECREATIONAL ACTIVITIES LIKE GOLF, BOWLING, DANCING, DOUBLES TENNIS OR THROWING A BASEBALL OR FOOTBALL: YES
CAN YOU HAVE SEXUAL RELATIONS: YES
CAN YOU WALK A BLOCK OR TWO ON LEVEL GROUND: YES
CAN YOU DO LIGHT WORK AROUND THE HOUSE LIKE DUSTING OR WASHING DISHES: YES
CAN YOU PARTICIPATE IN STRENOUS SPORTS LIKE SWIMMING, SINGLES TENNIS, FOOTBALL, BASKETBALL, OR SKIING: NO
CAN YOU CLIMB A FLIGHT OF STAIRS OR WALK UP A HILL: YES
CAN YOU WALK INDOORS, SUCH AS AROUND YOUR HOUSE: YES
DASI METS SCORE: 9

## 2024-12-04 ASSESSMENT — PAIN DESCRIPTION - DESCRIPTORS: DESCRIPTORS: ACHING

## 2024-12-04 ASSESSMENT — PAIN SCALES - GENERAL: PAINLEVEL_OUTOF10: 3

## 2024-12-04 ASSESSMENT — PAIN - FUNCTIONAL ASSESSMENT: PAIN_FUNCTIONAL_ASSESSMENT: 0-10

## 2024-12-04 NOTE — CPM/PAT H&P
[facility]/PAT Evaluation       Name: Bony Reynoldso Sr. (Bony Reynoldso Sr.)  /Age: 1955/69 y.o.     In-Person       Chief Complaint: Traumatic complete tear of left rotator cuff, Superior labrum anterior-to-posterior (SLAP) tear of left shoulder,Biceps tendinitis/Shoulder impingement/Synovitis of left shoulder     HPI  Patient is an alert and oriented 69 year old male scheduled for a left shoulder arthroscopy, rotator cuff repair, subacromial decompression and acromioplasty, intra-articular debridement and synovectomy and open subpectoral biceps tenodesis on 2024 with Dr. Osorio for traumatic complete tear of left rotator cuff, Superior labrum anterior-to-posterior (SLAP) tear of left shoulder,Biceps tendinitis/Shoulder impingement/Synovitis of left shoulder. He endorses left shoulder pain that he currently rates at a 4/10 and worsens with movement and lifting. PMHX includes GERD and OA. Presents to [facility] PAT today for perioperative risk stratification and optimization.     Past Medical History:   Diagnosis Date    GERD (gastroesophageal reflux disease)     Other conditions influencing health status     Ulcer     Past Surgical History:   Procedure Laterality Date    COLONOSCOPY      NASAL SEPTUM SURGERY  10/26/2015    Nasal Septal Deviation Repair    SHOULDER ARTHROSCOPY Right      Patient  has no history on file for sexual activity.    No family history on file.    No Known Allergies    Prior to Admission medications    Medication Sig Start Date End Date Taking? Authorizing Provider   aspirin-acetaminophen-caffeine (Excedrin Migraine) 250-250-65 mg tablet Take 1 tablet by mouth every 6 hours if needed for headaches.   Yes Historical Provider, MD   cyanocobalamin (Vitamin B-12) 1,000 mcg tablet Take 1 tablet (1,000 mcg) by mouth once daily.   Yes Historical Provider, MD   fexofenadine-pseudoephedrine (Allegra-D)  mg 12 hr tablet Take 1 tablet by mouth 2 times a day as needed for allergies. Do  not crush, chew, or split.   Yes Historical Provider, MD   glucosamine-chondroitin 500-400 mg tablet Take 1 tablet by mouth once daily.   Yes Historical Provider, MD   multivitamin tablet Take 1 tablet by mouth once daily.   Yes Historical Provider, MD   omega 3-dha-epa-fish oil (Fish OiL) 1,000 mg (120 mg-180 mg) capsule Take 1 capsule (1,000 mg) by mouth once daily.   Yes Historical Provider, MD   red yeast rice 600 mg capsule Take 600 mg by mouth once daily.   Yes Historical Provider, MD   TURMERIC ORAL Take 1 capsule by mouth once daily.   Yes Historical Provider, MD       Review of Systems   Constitutional: Negative for chills, decreased appetite, diaphoresis, fever and malaise/fatigue.   Eyes:  Negative for blurred vision and double vision.   Cardiovascular:  Negative for chest pain, claudication, cyanosis, dyspnea on exertion, irregular heartbeat, leg swelling, near-syncope and palpitations.   Respiratory:  Negative for cough, hemoptysis, shortness of breath and wheezing.    Endocrine: Negative for cold intolerance, heat intolerance, polydipsia, polyphagia and polyuria.   Gastrointestinal:  Negative for abdominal pain, constipation, diarrhea, dysphagia, nausea and vomiting.   Genitourinary:  Negative for bladder incontinence, dysuria, hematuria, incomplete emptying, nocturia, frequency, pelvic pain and urgency.   Neurological:  Negative for headaches, light-headedness, paresthesias, sensory change and weakness.   Psychiatric/Behavioral:  Negative for altered mental status.    Musculoskeletal: Negative for myalgias. Positive for arthralgias     Vitals and nursing note reviewed.     Physical exam  Constitutional:       Appearance: Normal appearance. He is Overweight.   HENT:      Head: Normocephalic and atraumatic.      Mouth/Throat:      Mouth: Mucous membranes are moist.      Pharynx: Oropharynx is clear.   Eyes:      Extraocular Movements: Extraocular movements intact.      Conjunctiva/sclera: Conjunctivae  "normal.      Pupils: Pupils are equal, round, and reactive to light.   Cardiovascular:      PMI at left midclavicular line. Normal rate. Regular rhythm. Normal S1. Normal S2.       Murmurs: There is no murmur.      No gallop.  No click. No rub.       No audible carotid bruit     No lower extremity edema on exam  Pulmonary:      Effort: Pulmonary effort is normal.      Breath sounds: Normal breath sounds.   Abdominal:      General: Abdomen is flat. Bowel sounds are normal.      Palpations: Abdomen is soft and non-tender  Musculoskeletal:      Cervical back: Normal range of motion and neck supple.      Shoulder, left: Limited ROM  Skin:     General: Skin is warm and dry.      Capillary Refill: Capillary refill takes less than 2 seconds.   Neurological:      General: No focal deficit present.      Mental Status: He is alert and oriented to person, place, and time. Mental status is at baseline.   Psychiatric:         Mood and Affect: Mood normal.         Behavior: Behavior normal.         Thought Content: Thought content normal.         Judgment: Judgment normal.     Vitals and nursing note reviewed. Physical exam within normal limits.     Visit Vitals  /81   Pulse 64   Temp 36.3 °C (97.3 °F) (Temporal)   Resp 12   Ht 1.753 m (5' 9\")   Wt 91.7 kg (202 lb 3.2 oz)   SpO2 100%   BMI 29.86 kg/m²   Smoking Status Former   BSA 2.11 m²      DASI Risk Score      Flowsheet Row Pre-Admission Testing from 12/4/2024 in [facility]   Can you take care of yourself (eat, dress, bathe, or use toilet)?  2.75 filed at 12/04/2024 0931   Can you walk indoors, such as around your house? 1.75 filed at 12/04/2024 0931   Can you walk a block or two on level ground?  2.75 filed at 12/04/2024 0931   Can you climb a flight of stairs or walk up a hill? 5.5 filed at 12/04/2024 0931   Can you run a short distance? 8 filed at 12/04/2024 0931   Can you do light work around the house like dusting or washing dishes? 2.7 filed at " 12/04/2024 0931   Can you do moderate work around the house like vacuuming, sweeping floors or carrying groceries? 3.5 filed at 12/04/2024 0931   Can you do heavy work around the house like scrubbing floors or lifting and moving heavy furniture?  8 filed at 12/04/2024 0931   Can you do yard work like raking leaves, weeding or pushing a mower? 4.5 filed at 12/04/2024 0931   Can you have sexual relations? 5.25 filed at 12/04/2024 0931   Can you participate in moderate recreational activities like golf, bowling, dancing, doubles tennis or throwing a baseball or football? 6 filed at 12/04/2024 0931   Can you participate in strenous sports like swimming, singles tennis, football, basketball, or skiing? 0 filed at 12/04/2024 0931   DASI SCORE 50.7 filed at 12/04/2024 0931   METS Score (Will be calculated only when all the questions are answered) 9 filed at 12/04/2024 0931          Caprini DVT Assessment      Flowsheet Row Pre-Admission Testing from 12/4/2024 in [facility]   DVT Score 8 filed at 12/04/2024 0931   Surgical Factors Elective major lower extremity arthroplasty filed at 12/04/2024 0931   BMI 30 or less filed at 12/04/2024 0931          Modified Frailty Index      Flowsheet Row Pre-Admission Testing from 12/4/2024 in [facility]   Non-independent functional status (problems with dressing, bathing, personal grooming, or cooking) 0 filed at 12/04/2024 0931   History of diabetes mellitus  0 filed at 12/04/2024 0931   History of COPD 0 filed at 12/04/2024 0931   History of CHF No filed at 12/04/2024 0931   History of MI 0 filed at 12/04/2024 0931   History of Percutaneous Coronary Intervention, Cardiac Surgery, or Angina No filed at 12/04/2024 0931   Hypertension requiring the use of medication  0 filed at 12/04/2024 0931   Peripheral vascular disease 0 filed at 12/04/2024 0931   Impaired sensorium (cognitive impairement or loss, clouding, or delirium) 0 filed at 12/04/2024 0931    TIA or CVA withouy residual deficit 0 filed at 12/04/2024 0931   Cerebrovascular accident with deficit 0 filed at 12/04/2024 0931   Modified Frailty Index Calculator 0 filed at 12/04/2024 0931          CHADS2 Stroke Risk  Current as of 9 minutes ago        N/A 3 to 100%: High Risk   2 to < 3%: Medium Risk   0 to < 2%: Low Risk     Last Change: N/A          This score determines the patient's risk of having a stroke if the patient has atrial fibrillation.        This score is not applicable to this patient. Components are not calculated.          Revised Cardiac Risk Index      Flowsheet Row Pre-Admission Testing from 12/4/2024 in [facility]   High-Risk Surgery (Intraperitoneal, Intrathoracic,Suprainguinal vascular) 0 filed at 12/04/2024 0931   History of ischemic heart disease (History of MI, History of positive exercuse test, Current chest paint considered due to myocardial ischemia, Use of nitrate therapy, ECG with pathological Q Waves) 0 filed at 12/04/2024 0931   History of congestive heart failure (pulmonary edemia, bilateral rales or S3 gallop, Paroxysmal nocturnal dyspnea, CXR showing pulmonary vascular redistribution) 0 filed at 12/04/2024 0931   History of cerebrovascular disease (Prior TIA or stroke) 0 filed at 12/04/2024 0931   Pre-operative insulin treatment 0 filed at 12/04/2024 0931   Pre-operative creatinine>2 mg/dl 0 filed at 12/04/2024 0931   Revised Cardiac Risk Calculator 0 filed at 12/04/2024 0931          Apfel Simplified Score    No data to display       Risk Analysis Index Results This Encounter    No data found in the last 10 encounters.       Stop Bang Score      Flowsheet Row Pre-Admission Testing from 12/4/2024 in [facility] Admission (Discharged) from 3/26/2024 in [facility] OR with Sean Osorio MD   Do you snore loudly? 1 filed at 12/04/2024 0904 1 filed at 03/26/2024 1100   Do you often feel tired or fatigued after your sleep? 0  filed at 12/04/2024 0904 0 filed at 03/26/2024 1100   Has anyone ever observed you stop breathing in your sleep? 0 filed at 12/04/2024 0904 0 filed at 03/26/2024 1100   Do you have or are you being treated for high blood pressure? 0 filed at 12/04/2024 0904 0 filed at 03/26/2024 1100   Recent BMI (Calculated) 29.9 filed at 12/04/2024 0904 28.6 filed at 03/26/2024 1100   Is BMI greater than 35 kg/m2? 0=No filed at 12/04/2024 0904 0=No filed at 03/26/2024 1100   Age older than 50 years old? 1=Yes filed at 12/04/2024 0904 1=Yes filed at 03/26/2024 1100   Is your neck circumference greater than 17 inches (Male) or 16 inches (Female)? 1 filed at 12/04/2024 0904 0 filed at 03/26/2024 1100   Gender - Male 1=Yes filed at 12/04/2024 0904 1=Yes filed at 03/26/2024 1100   STOP-BANG Total Score 4 filed at 12/04/2024 0904 3 filed at 03/26/2024 1100          Prodigy: High Risk  Total Score: 16              Prodigy Age Score      Prodigy Gender Score          ARISCAT Score for Postoperative Pulmonary Complications    No data to display       Lainez Perioperative Risk for Myocardial Infarction or Cardiac Arrest (LYNNETTE)      Flowsheet Row Pre-Admission Testing from 12/4/2024 in [facility]   Age 1.36 filed at 12/04/2024 0931   Functional Status  0 filed at 12/04/2024 0931   ASA Class  -3.29 filed at 12/04/2024 0931   Creatinine 0 filed at 12/04/2024 0931   Type of Procedure  0.80 filed at 12/04/2024 0931   LYNNETTE Total Score  -6.4 filed at 12/04/2024 0931   LYNNETTE % 0.17 filed at 12/04/2024 0931          Assessment & Plan:    Neuro:  No diagnosis or significant findings on chart review or clinical presentation and evaluation.     HEENT/Airway:  No diagnosis or significant findings on chart review or clinical presentation and evaluation.   STOP-BANG Score-4 points moderate risk for CHERRIE    Mallampati::  III    TM distance::  >3 FB    Neck ROM::  Full  Dentures-reports upper partial  Crowns-reports x  4  Implants-denies    Cardiovascular:  No diagnosis or significant findings on chart review or clinical presentation and evaluation.   METS: 9  RCRI: 0 points, 3.9%  risk for postoperative MACE   LYNNETTE: 0.17% risk for postoperative MACE  EKG -sinus bradycardia Rate-56 No acute changes    Pulmonary:  No diagnosis or significant findings on chart review or clinical presentation and evaluation.   ARISCAT: <26 points, 1.6% risk of in-hospital postoperative pulmonary complication  PRODIGY: Moderate risk for opioid induced respiratory depression  Smoking History-He quit smoking approximately 36 years ago. Previously smoked 1 PPD x 16 years  Discussed smoking cessation and deep breathing handout given    Renal/Urinary:  No diagnosis or significant findings on chart review or clinical presentation and evaluation, however, the patient is at increased risk of perioperative renal complications secondary to age>/= 56 and male sex. Preventative measures include BP monitoring, medication compliance, and hydration management.   CMP-Reviewed, stable  Creatinine-0.84  GFR->90    Endocrine:  No diagnosis or significant findings on chart review or clinical presentation and evaluation.     Hematologic/Immunology:  No diagnosis or significant findings on chart review or clinical presentation and evaluation.  The patient is not a Jehovah’s witness and will accept blood and blood products if medically indicated.   History of previous blood transfusions No  CBC-Reviewed, stable  HGB-14.9  Caprini Score 8, patient at High for postoperative DVT. Pt supplied education/VTE handout  Anticoagulation use: No     Gastrointestinal:   No diagnosis or significant findings on chart review or clinical presentation and evaluation.   Recreational drug use: Drug use No  Alcohol use social drinker    Infectious disease:   No diagnosis or significant findings on chart review or clinical presentation and evaluation.     Musculoskeletal:   No diagnosis or  significant findings on chart review or clinical presentation and evaluation.   JHFRAT score-3 points. low risk for falls    Anesthesia:  ASA 2 - Patient with mild systemic disease with no functional limitations  Anticipated anesthesia-Regional  History of General anesthesia- yes  Complications- No anesthesia complications  No family history of anesthesia complications    Abnormalities noted on PAT evaluation: No    Labs & Imaging ordered:  CBC, CMP, EKG  Nickel/metal allergy-negative  Shellfish allergy-negative    Overall, patient Low Risk for the scheduled Moderate Risk surgery. Discussed with patient medication instructions, NPO guidelines, and any questions or concerns.     Face to face time-25 minutes

## 2024-12-04 NOTE — H&P (VIEW-ONLY)
[facility]/PAT Evaluation       Name: Bony Reynoldso Sr. (Bony Reynoldso Sr.)  /Age: 1955/69 y.o.     In-Person       Chief Complaint: Traumatic complete tear of left rotator cuff, Superior labrum anterior-to-posterior (SLAP) tear of left shoulder,Biceps tendinitis/Shoulder impingement/Synovitis of left shoulder     HPI  Patient is an alert and oriented 69 year old male scheduled for a left shoulder arthroscopy, rotator cuff repair, subacromial decompression and acromioplasty, intra-articular debridement and synovectomy and open subpectoral biceps tenodesis on 2024 with Dr. Osorio for traumatic complete tear of left rotator cuff, Superior labrum anterior-to-posterior (SLAP) tear of left shoulder,Biceps tendinitis/Shoulder impingement/Synovitis of left shoulder. He endorses left shoulder pain that he currently rates at a 4/10 and worsens with movement and lifting. PMHX includes GERD and OA. Presents to [facility] today for perioperative risk stratification and optimization.     Past Medical History:   Diagnosis Date    GERD (gastroesophageal reflux disease)     Other conditions influencing health status     Ulcer     Past Surgical History:   Procedure Laterality Date    COLONOSCOPY      NASAL SEPTUM SURGERY  10/26/2015    Nasal Septal Deviation Repair    SHOULDER ARTHROSCOPY Right      Patient  has no history on file for sexual activity.    No family history on file.    No Known Allergies    Prior to Admission medications    Medication Sig Start Date End Date Taking? Authorizing Provider   aspirin-acetaminophen-caffeine (Excedrin Migraine) 250-250-65 mg tablet Take 1 tablet by mouth every 6 hours if needed for headaches.   Yes Historical Provider, MD   cyanocobalamin (Vitamin B-12) 1,000 mcg tablet Take 1 tablet (1,000 mcg) by mouth once daily.   Yes Historical Provider, MD   fexofenadine-pseudoephedrine (Allegra-D)  mg 12 hr tablet Take 1 tablet by mouth 2 times a day as needed for allergies. Do  not crush, chew, or split.   Yes Historical Provider, MD   glucosamine-chondroitin 500-400 mg tablet Take 1 tablet by mouth once daily.   Yes Historical Provider, MD   multivitamin tablet Take 1 tablet by mouth once daily.   Yes Historical Provider, MD   omega 3-dha-epa-fish oil (Fish OiL) 1,000 mg (120 mg-180 mg) capsule Take 1 capsule (1,000 mg) by mouth once daily.   Yes Historical Provider, MD   red yeast rice 600 mg capsule Take 600 mg by mouth once daily.   Yes Historical Provider, MD   TURMERIC ORAL Take 1 capsule by mouth once daily.   Yes Historical Provider, MD       Review of Systems   Constitutional: Negative for chills, decreased appetite, diaphoresis, fever and malaise/fatigue.   Eyes:  Negative for blurred vision and double vision.   Cardiovascular:  Negative for chest pain, claudication, cyanosis, dyspnea on exertion, irregular heartbeat, leg swelling, near-syncope and palpitations.   Respiratory:  Negative for cough, hemoptysis, shortness of breath and wheezing.    Endocrine: Negative for cold intolerance, heat intolerance, polydipsia, polyphagia and polyuria.   Gastrointestinal:  Negative for abdominal pain, constipation, diarrhea, dysphagia, nausea and vomiting.   Genitourinary:  Negative for bladder incontinence, dysuria, hematuria, incomplete emptying, nocturia, frequency, pelvic pain and urgency.   Neurological:  Negative for headaches, light-headedness, paresthesias, sensory change and weakness.   Psychiatric/Behavioral:  Negative for altered mental status.    Musculoskeletal: Negative for myalgias. Positive for arthralgias     Vitals and nursing note reviewed.     Physical exam  Constitutional:       Appearance: Normal appearance. He is Overweight.   HENT:      Head: Normocephalic and atraumatic.      Mouth/Throat:      Mouth: Mucous membranes are moist.      Pharynx: Oropharynx is clear.   Eyes:      Extraocular Movements: Extraocular movements intact.      Conjunctiva/sclera: Conjunctivae  "normal.      Pupils: Pupils are equal, round, and reactive to light.   Cardiovascular:      PMI at left midclavicular line. Normal rate. Regular rhythm. Normal S1. Normal S2.       Murmurs: There is no murmur.      No gallop.  No click. No rub.       No audible carotid bruit     No lower extremity edema on exam  Pulmonary:      Effort: Pulmonary effort is normal.      Breath sounds: Normal breath sounds.   Abdominal:      General: Abdomen is flat. Bowel sounds are normal.      Palpations: Abdomen is soft and non-tender  Musculoskeletal:      Cervical back: Normal range of motion and neck supple.      Shoulder, left: Limited ROM  Skin:     General: Skin is warm and dry.      Capillary Refill: Capillary refill takes less than 2 seconds.   Neurological:      General: No focal deficit present.      Mental Status: He is alert and oriented to person, place, and time. Mental status is at baseline.   Psychiatric:         Mood and Affect: Mood normal.         Behavior: Behavior normal.         Thought Content: Thought content normal.         Judgment: Judgment normal.     Vitals and nursing note reviewed. Physical exam within normal limits.     Visit Vitals  /81   Pulse 64   Temp 36.3 °C (97.3 °F) (Temporal)   Resp 12   Ht 1.753 m (5' 9\")   Wt 91.7 kg (202 lb 3.2 oz)   SpO2 100%   BMI 29.86 kg/m²   Smoking Status Former   BSA 2.11 m²      DASI Risk Score      Flowsheet Row Pre-Admission Testing from 12/4/2024 in [facility]   Can you take care of yourself (eat, dress, bathe, or use toilet)?  2.75 filed at 12/04/2024 0931   Can you walk indoors, such as around your house? 1.75 filed at 12/04/2024 0931   Can you walk a block or two on level ground?  2.75 filed at 12/04/2024 0931   Can you climb a flight of stairs or walk up a hill? 5.5 filed at 12/04/2024 0931   Can you run a short distance? 8 filed at 12/04/2024 0931   Can you do light work around the house like dusting or washing dishes? 2.7 filed at " 12/04/2024 0931   Can you do moderate work around the house like vacuuming, sweeping floors or carrying groceries? 3.5 filed at 12/04/2024 0931   Can you do heavy work around the house like scrubbing floors or lifting and moving heavy furniture?  8 filed at 12/04/2024 0931   Can you do yard work like raking leaves, weeding or pushing a mower? 4.5 filed at 12/04/2024 0931   Can you have sexual relations? 5.25 filed at 12/04/2024 0931   Can you participate in moderate recreational activities like golf, bowling, dancing, doubles tennis or throwing a baseball or football? 6 filed at 12/04/2024 0931   Can you participate in strenous sports like swimming, singles tennis, football, basketball, or skiing? 0 filed at 12/04/2024 0931   DASI SCORE 50.7 filed at 12/04/2024 0931   METS Score (Will be calculated only when all the questions are answered) 9 filed at 12/04/2024 0931          Caprini DVT Assessment      Flowsheet Row Pre-Admission Testing from 12/4/2024 in [facility]   DVT Score 8 filed at 12/04/2024 0931   Surgical Factors Elective major lower extremity arthroplasty filed at 12/04/2024 0931   BMI 30 or less filed at 12/04/2024 0931          Modified Frailty Index      Flowsheet Row Pre-Admission Testing from 12/4/2024 in [facility]   Non-independent functional status (problems with dressing, bathing, personal grooming, or cooking) 0 filed at 12/04/2024 0931   History of diabetes mellitus  0 filed at 12/04/2024 0931   History of COPD 0 filed at 12/04/2024 0931   History of CHF No filed at 12/04/2024 0931   History of MI 0 filed at 12/04/2024 0931   History of Percutaneous Coronary Intervention, Cardiac Surgery, or Angina No filed at 12/04/2024 0931   Hypertension requiring the use of medication  0 filed at 12/04/2024 0931   Peripheral vascular disease 0 filed at 12/04/2024 0931   Impaired sensorium (cognitive impairement or loss, clouding, or delirium) 0 filed at 12/04/2024 0931    TIA or CVA withouy residual deficit 0 filed at 12/04/2024 0931   Cerebrovascular accident with deficit 0 filed at 12/04/2024 0931   Modified Frailty Index Calculator 0 filed at 12/04/2024 0931          CHADS2 Stroke Risk  Current as of 9 minutes ago        N/A 3 to 100%: High Risk   2 to < 3%: Medium Risk   0 to < 2%: Low Risk     Last Change: N/A          This score determines the patient's risk of having a stroke if the patient has atrial fibrillation.        This score is not applicable to this patient. Components are not calculated.          Revised Cardiac Risk Index      Flowsheet Row Pre-Admission Testing from 12/4/2024 in [facility]   High-Risk Surgery (Intraperitoneal, Intrathoracic,Suprainguinal vascular) 0 filed at 12/04/2024 0931   History of ischemic heart disease (History of MI, History of positive exercuse test, Current chest paint considered due to myocardial ischemia, Use of nitrate therapy, ECG with pathological Q Waves) 0 filed at 12/04/2024 0931   History of congestive heart failure (pulmonary edemia, bilateral rales or S3 gallop, Paroxysmal nocturnal dyspnea, CXR showing pulmonary vascular redistribution) 0 filed at 12/04/2024 0931   History of cerebrovascular disease (Prior TIA or stroke) 0 filed at 12/04/2024 0931   Pre-operative insulin treatment 0 filed at 12/04/2024 0931   Pre-operative creatinine>2 mg/dl 0 filed at 12/04/2024 0931   Revised Cardiac Risk Calculator 0 filed at 12/04/2024 0931          Apfel Simplified Score    No data to display       Risk Analysis Index Results This Encounter    No data found in the last 10 encounters.       Stop Bang Score      Flowsheet Row Pre-Admission Testing from 12/4/2024 in [facility] Admission (Discharged) from 3/26/2024 in [facility] OR with Sean Osorio MD   Do you snore loudly? 1 filed at 12/04/2024 0904 1 filed at 03/26/2024 1100   Do you often feel tired or fatigued after your sleep? 0  filed at 12/04/2024 0904 0 filed at 03/26/2024 1100   Has anyone ever observed you stop breathing in your sleep? 0 filed at 12/04/2024 0904 0 filed at 03/26/2024 1100   Do you have or are you being treated for high blood pressure? 0 filed at 12/04/2024 0904 0 filed at 03/26/2024 1100   Recent BMI (Calculated) 29.9 filed at 12/04/2024 0904 28.6 filed at 03/26/2024 1100   Is BMI greater than 35 kg/m2? 0=No filed at 12/04/2024 0904 0=No filed at 03/26/2024 1100   Age older than 50 years old? 1=Yes filed at 12/04/2024 0904 1=Yes filed at 03/26/2024 1100   Is your neck circumference greater than 17 inches (Male) or 16 inches (Female)? 1 filed at 12/04/2024 0904 0 filed at 03/26/2024 1100   Gender - Male 1=Yes filed at 12/04/2024 0904 1=Yes filed at 03/26/2024 1100   STOP-BANG Total Score 4 filed at 12/04/2024 0904 3 filed at 03/26/2024 1100          Prodigy: High Risk  Total Score: 16              Prodigy Age Score      Prodigy Gender Score          ARISCAT Score for Postoperative Pulmonary Complications    No data to display       Lainez Perioperative Risk for Myocardial Infarction or Cardiac Arrest (LYNNETTE)      Flowsheet Row Pre-Admission Testing from 12/4/2024 in [facility]   Age 1.36 filed at 12/04/2024 0931   Functional Status  0 filed at 12/04/2024 0931   ASA Class  -3.29 filed at 12/04/2024 0931   Creatinine 0 filed at 12/04/2024 0931   Type of Procedure  0.80 filed at 12/04/2024 0931   LYNNETTE Total Score  -6.4 filed at 12/04/2024 0931   LYNNETTE % 0.17 filed at 12/04/2024 0931          Assessment & Plan:    Neuro:  No diagnosis or significant findings on chart review or clinical presentation and evaluation.     HEENT/Airway:  No diagnosis or significant findings on chart review or clinical presentation and evaluation.   STOP-BANG Score-4 points moderate risk for CHERRIE    Mallampati::  III    TM distance::  >3 FB    Neck ROM::  Full  Dentures-reports upper partial  Crowns-reports x  4  Implants-denies    Cardiovascular:  No diagnosis or significant findings on chart review or clinical presentation and evaluation.   METS: 9  RCRI: 0 points, 3.9%  risk for postoperative MACE   LYNNETTE: 0.17% risk for postoperative MACE  EKG -sinus bradycardia Rate-56 No acute changes    Pulmonary:  No diagnosis or significant findings on chart review or clinical presentation and evaluation.   ARISCAT: <26 points, 1.6% risk of in-hospital postoperative pulmonary complication  PRODIGY: Moderate risk for opioid induced respiratory depression  Smoking History-He quit smoking approximately 36 years ago. Previously smoked 1 PPD x 16 years  Discussed smoking cessation and deep breathing handout given    Renal/Urinary:  No diagnosis or significant findings on chart review or clinical presentation and evaluation, however, the patient is at increased risk of perioperative renal complications secondary to age>/= 56 and male sex. Preventative measures include BP monitoring, medication compliance, and hydration management.   CMP-Reviewed, stable  Creatinine-0.84  GFR->90    Endocrine:  No diagnosis or significant findings on chart review or clinical presentation and evaluation.     Hematologic/Immunology:  No diagnosis or significant findings on chart review or clinical presentation and evaluation.  The patient is not a Jehovah’s witness and will accept blood and blood products if medically indicated.   History of previous blood transfusions No  CBC-Reviewed, stable  HGB-14.9  Caprini Score 8, patient at High for postoperative DVT. Pt supplied education/VTE handout  Anticoagulation use: No     Gastrointestinal:   No diagnosis or significant findings on chart review or clinical presentation and evaluation.   Recreational drug use: Drug use No  Alcohol use social drinker    Infectious disease:   No diagnosis or significant findings on chart review or clinical presentation and evaluation.     Musculoskeletal:   No diagnosis or  significant findings on chart review or clinical presentation and evaluation.   JHFRAT score-3 points. low risk for falls    Anesthesia:  ASA 2 - Patient with mild systemic disease with no functional limitations  Anticipated anesthesia-Regional  History of General anesthesia- yes  Complications- No anesthesia complications  No family history of anesthesia complications    Abnormalities noted on PAT evaluation: No    Labs & Imaging ordered:  CBC, CMP, EKG  Nickel/metal allergy-negative  Shellfish allergy-negative    Overall, patient Low Risk for the scheduled Moderate Risk surgery. Discussed with patient medication instructions, NPO guidelines, and any questions or concerns.     Face to face time-25 minutes

## 2024-12-04 NOTE — PREPROCEDURE INSTRUCTIONS
Medication List            Accurate as of December 4, 2024  9:15 AM. Always use your most recent med list.                aspirin-acetaminophen-caffeine 250-250-65 mg tablet  Commonly known as: Excedrin Migraine  Additional Medication Adjustments for Surgery: Take last dose 7 days before surgery  Notes to patient: Last dose preoperatively 12/9/2024     cyanocobalamin 1,000 mcg tablet  Commonly known as: Vitamin B-12  Additional Medication Adjustments for Surgery: Take last dose 7 days before surgery  Notes to patient: Last dose preoperatively 12/9/2024     fexofenadine-pseudoephedrine  mg 12 hr tablet  Commonly known as: Allegra-D  Medication Adjustments for Surgery: Do Not take on the morning of surgery  Notes to patient: Last dose preoperatively 12/16/2024     Fish OiL 1,000 (120-180) mg capsule  Generic drug: omega 3-dha-epa-fish oil  Additional Medication Adjustments for Surgery: Take last dose 7 days before surgery  Notes to patient: Last dose preoperatively 12/9/2024     glucosamine-chondroitin 500-400 mg tablet  Additional Medication Adjustments for Surgery: Take last dose 7 days before surgery  Notes to patient: Last dose preoperatively 12/9/2024     multivitamin tablet  Additional Medication Adjustments for Surgery: Take last dose 7 days before surgery  Notes to patient: Last dose preoperatively 12/9/2024     red yeast rice 600 mg capsule  Additional Medication Adjustments for Surgery: Take last dose 7 days before surgery  Notes to patient: Last dose preoperatively 12/9/2024     TURMERIC ORAL  Additional Medication Adjustments for Surgery: Take last dose 7 days before surgery  Notes to patient: Last dose preoperatively 12/9/2024            NPO Instructions:     Do not eat any food after midnight the night before your surgery/procedure.  You may have clear liquids until TWO hours before surgery/procedure. This includes water, black tea/coffee, (no milk or cream) apple juice and electrolyte drinks  (Gatorade).  You may chew gum up to TWO hours before your surgery/procedure.     Additional Instructions:      Seven/Six Days before Surgery:  Review your medication instructions, stop indicated medications  Five Days before Surgery:  Review your medication instructions, stop indicated medications  Three Days before Surgery:  Review your medication instructions, stop indicated medications  The Day before Surgery:  No smoking or alcohol use 24 hours before surgery  Review your medication instructions, stop indicated medications  You will be contacted regarding the time of your arrival to facility and surgery time  Do not eat any food after Midnight  Day of Surgery:  Review your medication instructions, take indicated medications  If you have diabetes, please check your fasting blood sugar upon awakening.  If fasting blood sugar is <80 mg/dl, drink 100 ml of apple juice, time limit of 2 hours before  You may have clear liquids until TWO hours before surgery/procedure.  This includes water, black tea/coffee, (no milk or cream) apple juice and electrolyte drinks (Gatorade)  You may chew gum up to TWO hours before your surgery/procedure  Wear  comfortable loose fitting clothing  Do not use moisturizers, creams, lotions or perfume  All jewelry and valuables should be left at home     CONTACT SURGEON'S OFFICE IF YOU DEVELOP:  * Fever = 100.4 F   * New respiratory symptoms (e.g. cough, shortness of breath, respiratory distress, sore throat)  * Recent loss of taste or smell  *Flu like symptoms such as headache, fatigue or gastrointestinal symptoms  * You develop any open sores, shingles, burning or painful urination   AND/OR:  * You no longer wish to have the surgery.  * Any other personal circumstances change that may lead to the need to cancel or defer this surgery.  *You were admitted to any hospital within one week of your planned procedure.     SMOKING:  *Quitting smoking can make a huge difference to your health and  recovery from surgery.    *If you need help with quitting, call 7-043-QUIT-NOW.     THE DAY BEFORE SURGERY:  *Do not eat any food after midnight the night before your surgery.   *You may have up to TEN OUNCES of clear liquids until TWO hours before your instructed ARRIVAL TIME to hospital. This includes water, black tea/coffee, (no milk or cream) apple juice, clear broth and electrolyte drinks (Gatorade). Please avoid clear liquids that are red in color.   *You may chew gum/mints up to TWO hours before your surgery/procedure.     SURGICAL TIME:  *You will be contacted between 2 p.m. and 3 p.m. the business day before your surgery with your arrival time.  *If you haven't received a call by 3pm, call [phone]  *Scheduled surgery times may change and you will be notified if this occurs-check your personal voicemail for any updates.     ON THE MORNING OF SURGERY:  *Wear comfortable, loose fitting clothing.   *Do not use moisturizers, creams, lotions or perfume.  *All jewelry and valuables should be left at home.  *Prosthetic devices such as contact lenses, hearing aids, dentures, eyelash extensions, hairpins and body piercing must be removed before surgery.     BRING WITH YOU:  *Photo ID and insurance card  *Current list of medications and allergies  *Pacemaker/Defibrillator/Heart stent cards  *CPAP machine and mask  *Slings/splints/crutches  *Copy of your complete Advanced Directive/DHPOA-if applicable  *Neurostimulator implant remote     PARKING AND ARRIVAL:  *Check in at the Main Entrance desk and let them know you are here for surgery.     IF YOU ARE HAVING OUTPATIENT/SAME DAY SURGERY:  *A responsible adult MUST accompany you at the time of discharge and stay with you for 24 hours after your surgery.  *You may NOT drive yourself home after surgery.  *You may use a taxi or ride sharing service ([employer], [employer]) to return home ONLY if you are accompanied by a friend or family member.  *Instructions for resuming your  medications will be provided by your surgeon.     Thank you for coming to Pre Admission testing.      If I have prescribed medication please don't forget to  at your pharmacy.      Any questions about today's visit call [phone] and leave a message in the general mailbox.     Patient instructed to ambulate as soon as possible postoperatively to decrease thromboembolic risk.     Andi Gan, APRN-CNP     Thank you for visiting [facility].  If you have any changes to your health condition, please call the surgeons office to alert them and give them details of your symptoms.        Preoperative Fasting Guidelines     Why must I stop eating and drinking near surgery time?  With sedation, food or liquid in your stomach can enter your lungs causing serious complications  Increases nausea and vomiting     When do I need to stop eating and drinking before my surgery?  Do not eat any food after midnight the night before your surgery/procedure.  You may have up to TEN ounces of clear liquid until TWO hours before your instructed arrival time to the hospital.  This includes water, black tea/coffee, (no milk or cream) apple juice, and electrolyte drinks (Gatorade)  You may chew gum until TWO hours before your surgery/procedure        Additional Instructions:      The Day before Surgery:  -Review your medication instructions, stop indicated medications  -You will be contacted in the evening regarding the time of your arrival to facility and surgery time     Day of Surgery:  -Review your medication instructions, take indicated medications  -Wear comfortable loose fitting clothing  -Do not use moisturizers, creams, lotions or perfume  -All jewelry and valuables should be left at home                   Preoperative Brain Exercises     What are brain exercises?  A brain exercise is any activity that engages your thinking (cognitive) skills.     What types of activities are considered brain  exercises?  Jigsaw puzzles, crossword puzzles, word jumble, memory games, word search, and many more.  Many can be found free online or on your phone via a mobile kristal.     Why should I do brain exercises before my surgery?  More recent research has shown brain exercise before surgery can lower the risk of postoperative delirium (confusion) which can be especially important for older adults.  Patients who did brain exercises for 5 to 10 hours the days before surgery, cut their risk of postoperative delirium in half up to 1 week after surgery.                         [facility]     Preoperative Deep Breathing Exercises     Why it is important to do deep breathing exercises before my surgery?  Deep breathing exercises strengthen your breathing muscles.  This helps you to recover after your surgery and decreases the chance of breathing complications.        How are the deep breathing exercises done?  Sit straight with your back supported.  Breathe in deeply and slowly through your nose. Your lower rib cage should expand and your abdomen may move forward.  Hold that breath for 3 to 5 seconds.  Breathe out through pursed lips, slowly and completely.  Rest and repeat 10 times every hour while awake.  Rest longer if you become dizzy or lightheaded.                      [facility]     Preoperative Deep Breathing Exercises     Why it is important to do deep breathing exercises before my surgery?  Deep breathing exercises strengthen your breathing muscles.  This helps you to recover after your surgery and decreases the chance of breathing complications.        How are the deep breathing exercises done?  Sit straight with your back supported.  Breathe in deeply and slowly through your nose. Your lower rib cage should expand and your abdomen may move forward.  Hold that breath for 3 to 5 seconds.  Breathe out through pursed lips, slowly and completely.  Rest and repeat 10  times every hour while awake.  Rest longer if you become dizzy or lightheaded.        Patient Information: Incentive Spirometer  What is an incentive spirometer?  An incentive spirometer is a device used before and after surgery to “exercise” your lungs.  It helps you to take deeper breaths to expand your lungs.  Below is an example of a basic incentive spirometer.  The device you receive may differ slightly but they all function the same.    Why do I need to use an incentive spirometer?  Using your incentive spirometer prepares your lungs for surgery and helps prevent lung problems after surgery.  How do I use my incentive spirometer?  When you're using your incentive spirometer, make sure to breathe through your mouth. If you breathe through your nose, the incentive spirometer won't work properly. You can hold your nose if you have trouble.  If you feel dizzy at any time, stop and rest. Try again at a later time.  Follow the steps below:  Set up your incentive spirometer, expand the flexible tubing and connect to the outlet.  Sit upright in a chair or bed. Hold the incentive spirometer at eye level.   Put the mouthpiece in your mouth and close your lips tightly around it. Slowly breathe out (exhale) completely.  Breathe in (inhale) slowly through your mouth as deeply as you can. As you take a breath, you will see the piston rise inside the large column. While the piston rises, the indicator should move upwards. It should stay in between the 2 arrows (see Figure).  Try to get the piston as high as you can, while keeping the indicator between the arrows.   If the indicator doesn't stay between the arrows, you're breathing either too fast or too slow.  When you get it as high as you can, hold your breath for 10 seconds, or as long as possible. While you're holding your breath, the piston will slowly fall to the base of the spirometer.  Once the piston reaches the bottom of the spirometer, breathe out slowly through  your mouth. Rest for a few seconds.  Repeat 10 times. Try to get the piston to the same level with each breath.  Repeat every hour while awake  You can carefully clean the outside of the mouthpiece with an alcohol wipe or soap and water.       Patient and Family Education             Ways You Can Help Prevent Blood Clots                    This handout explains some simple things you can do to help prevent blood clots.      Blood clots are blockages that can form in the body's veins. When a blood clot forms in your deep veins, it may be called a deep vein thrombosis, or DVT for short. Blood clots can happen in any part of the body where blood flows, but they are most common in the arms and legs. If a piece of a blood clot breaks free and travels to the lungs, it is called a pulmonary embolus (PE). A PE can be a very serious problem.         Being in the hospital or having surgery can raise your chances of getting a blood clot because you may not be well enough to move around as much as you normally do.         Ways you can help prevent blood clots in the hospital           Wearing SCDs. SCDs stands for Sequential Compression Devices.   SCDs are special sleeves that wrap around your legs  They attach to a pump that fills them with air to gently squeeze your legs every few minutes.   This helps return the blood in your legs to your heart.   SCDs should only be taken off when walking or bathing.   SCDs may not be comfortable, but they can help save your life.                                            Wearing compression stockings - if your doctor orders them. These special snug fitting stockings gently squeeze your legs to help blood flow.       Walking. Walking helps move the blood in your legs.   If your doctor says it is ok, try walking the halls at least   5 times a day. Ask us to help you get up, so you don't fall.      Taking any blood thinning medicines your doctor orders.        Page 1 of 2             ©[facility]; 3/23   Ways you can help prevent blood clots at home         Wearing compression stockings - if your doctor orders them. ? Walking - to help move the blood in your legs.       Taking any blood thinning medicines your doctor orders.      Signs of a blood clot or PE        Tell your doctor or nurse know right away if you have of the problems listed below.    If you are at home, seek medical care right away. Call 911 for chest pain or problems breathing.                Signs of a blood clot (DVT) - such as pain,  swelling, redness or warmth in your arm or leg      Signs of a pulmonary embolism (PE) - such as chest pain or feeling short of breath

## 2024-12-05 LAB
EST. AVERAGE GLUCOSE BLD GHB EST-MCNC: 108 MG/DL
HBA1C MFR BLD: 5.4 %

## 2024-12-10 NOTE — DISCHARGE INSTRUCTIONS
Sean Osorio M.D.   Sports Medicine Orthopaedic Surgery    [facility], [place], operated by [facility]  [address] [address]                  [address] [facility]       [address]  [facility], [address]   Phone: [phone]         Phone: [phone]       Phone: [phone]   Fax: [phone]                         Fax: [phone]       Fax: [phone]     After hours phone number: [phone]    AFTER SURGERY     Anesthesia  If you received a nerve block during surgery, you may have numbness or inability to move the limb. Do not be alarmed as this may last 8-36 hours depending upon the amount and type of medication used by the anesthesiologist. Make sure If you are experiencing numbness after 36 hours, please call the office. When the nerve block begins to wear off, you will feel a tingling sensation, like pins and needles. It is important that you start taking the pain medication at that time to ensure that you “stay ahead of the pain.” It is important to take the pain medicine when the pain level is a 4 or 5/10, before it gets too high.    Do not operate heavy machinery, make major decisions, drink alcohol or smoke for 24 hours. Do not drink alcohol while taking pain medications.    Prescribed Medications   Narcotic pain medicine (Oxycodone): The goal of post-operative pain management is pain control, NOT pain elimination. You should expect some pain after surgery - this pain helps you protect itself while it is healing. Constipation, nausea, itching, and drowsiness are side effects of this type of medication. You should take an over-the-counter stool softener (Colace and/or Senna) while taking narcotics to prevent constipation. If you experience itching, over the counter Benadryl may be helpful. Narcotic pain medications often produce drowsiness and it is against the law to operate a vehicle  while taking these medications. If you are taking oxycodone, you should take acetaminophen (Tylenol) around the clock to decrease baseline pain. Do not take Tylenol-containing products while on Percocet or [place].   Refill Policy: For concerns over your safety due to the rising opioid addiction epidemic in the [place], refills of your narcotic pain medications will only be provided on a case by case basis. Please use these medications judiciously.    Anti-inflammatory (NSAID) medicine (Naproxen or Mobic): These are both anti-inflammatory and pain relief. Do NOT take this medication if you have had an ulcer in the past unless you have cleared this with your primary care doctor. You should take NSAIDs with food or antacid to reduce the chance of upset stomach. Depending on your surgery, Dr. Osorio may instruct you to avoid these medications.    Anti-nausea medicine (ondansetron/Zofran): sometimes patients experience nausea related to either anesthesia or the narcotic pain medication. If this is the case you will find this medication helpful.    DVT prophylaxis (Aspirin or Eliquis): For most patients, activity alone is sufficient to prevent dangerous blood clots, but in some cases your personal risk profile and/or the type of surgery you have undergone makes it necessary that you take medication to help prevent blood clots. Dr. Osorio will inform you if you are to start one of these medications postoperatively.    Stool softener (Colace and/or Senna): are available over the counter at your local pharmacy and should be taken while you are taking narcotic pain medication to avoid constipation. You should stop taking these medications if you develop diarrhea. Over the counter laxatives may be used if you develop painful constipation.     Diet   Start with clear liquids (water, juice, Gatorade) and light foods (jello, soup, crackers). Progress to normal diet as tolerated if you are not nauseated. Avoid greasy or spicy  foods for the first 24hrs to avoid GI upset. Increase fluid intake to help prevent constipation.    Dressings / Wound Care  You may remove the outer dressing after 3 days and then can shower. (If you have a splint, please leave the splint in place until follow-up.) Do not remove Steri-strips (white stickers) if present over your incisions. Steri-strips may fall off on their own, which is normal. After the bandage has been removed, you may leave the incisions open to air. Alternatively, if you prefer to keep them covered, you may do so with Band-Aids, a light gauze dressing, or a clean ACE wrap. You may shower after the bandage has been removed (3 days), but it is very important that you pat the wounds dry after the shower. It is OK to allow soap and water to run over the wound - DO NOT soak or scrub the wound. Outside of the shower, keep your incision clean and dry until your first postoperative visit, approximately 10-14 days after surgery. You may remove your sling or brace to shower, unless otherwise instructed. As your balance may be affected by recent surgery, we recommend placing a plastic chair in the shower to help prevent falls. Do NOT take baths, go into a pool, or soak the operative site until approved by Dr. Osorio.    Bracing / Physical Therapy   If you were given one, make sure you wear sling or brace at all times until your follow-up with Dr. Osorio. Only remove your sling or brace for physical therapy, home exercises, and hygiene. These are typically used for 4-6 weeks after surgery in order to protect the healing of the tissue.     Physical therapy is just as important to your recovery as the actual operation! If you were given a prescription for physical therapy, make sure you go to your appointments and do your exercises daily at home (especially motion exercises).     Ice is a very important part of your recovery. It helps reduce inflammation and improves pain control. You should ice a few times  each day for 20-30 minutes at a time. Please make sure there is something under the ice (clean towel, cloth, T-shirt) so that the ice doesn't directly contact your skin. If you ordered a commercially available ice machine (optional) and a compression setting is available, you should use LOW or no compression during the first 5 days. After that, you may increase compression setting as tolerated. If the compression is bothering you then do not use compression.    Driving / Travel  Ultimately, it is your judgment to decide when you are safe to drive, but if you are at all unsure, do not risk your life or someone else's. As a general guideline, you will not be able to drive for 4-6 weeks after surgery. You should certainly not drive while on narcotics pain medication or while in a brace or sling.     Avoid flights and long distance traveling for 6 weeks after surgery. It is important to discuss your travel plans with Dr. Osorio, as additional medications may need to be prescribed to help prevent blood clots if certain travel is unavoidable.     Return to Work or School   Your return to work will depend on what surgery was done and what type of work you do. Please note that these are general guidelines, and there may be modifications based on your unique situation. Typically, you may return to sedentary work or school 3-7 days after surgery if pain is tolerable and you are no longer requiring narcotic pain medication. In conjunction with your input, Dr. Osorio will determine when you may return to more physically rigorous demands.     If you had Knee or Hip Surgery   If your surgery involves a ligament reconstruction or tendon repair, you will typically be prescribed crutches for at least the first few days until pain and the postoperative protocol allows you to fully bear weight and also wear a brace for 4-6 weeks. If cartilage surgery or meniscus repair is performed, you may be on crutches for 6 weeks. Individual  rehabilitation guidelines will vary based upon the unique situation and surgery of every patient, but take these general guidelines into account when planning return to work.     If you had Shoulder Surgery   If your surgery involves a repair (rotator cuff repair, labral repair), you will have a sling on for six weeks after surgery. If you have a sling, you will need to wear it all day. Please wear the sling at all times except for bathing for the first 2 weeks minimum. As long as you can abide by the restrictions, you can return to work when you feel like you can do so safely. However, you will need to take into consideration driving and activities related to your job. You may be able to safely loosen it if you are able to keep your arm supported. Please understand that you will NOT be able to work with your arm away from your body, above shoulder level, or use your arm against gravity for approximately 8 weeks. For jobs that require physical labor, you may require four months or more to return to work. If your surgery does NOT involve a repair (subacromial decompression, distal clavicle excision, capsular release), then you will be in a sling for only a few days after surgery. When comfortable, you may return to work when ready to conduct normal activities of your job. Remember that you may be on narcotic pain medications and these should be discontinued prior to your return to work. For jobs that require physical labor, you may require 6 weeks or more to return to work.     If you had Elbow or Wrist Surgery   If your surgery involves a repair or reconstruction, you will have a splint and sling on followed by a brace for four to six weeks after surgery. As long as you can abide by the restrictions, you can return to work when you feel like you can do so safely. However, you will need to take into consideration driving and activities related to your job. If you have a sling, you will need to wear it all day unless  otherwise instructed. You may be able to safely loosen it if you are able to keep your arm supported. For jobs that require physical labor, you may require four months or more to return to work.    If you had Ankle Surgery   If your surgery involves a repair or reconstruction, you will have a splint followed by a walking boot for four to six weeks after surgery. As long as you can abide by the restrictions, you can return to work when you feel like you can do so safely. However, you will need to take into consideration driving and activities related to your job. For jobs that require physical labor, you may require four months or more to return to work.    Normal Sensations and Findings after Surgery:   PAIN: We do everything possible to make your pain/discomfort level tolerable, but some amount of pain is to be expected.   WARMTH: Mild warmth around the operative site is normal for up to 3 weeks.   REDNESS: Small amount of redness where the sutures enter the skin is normal. If redness worsens or spreads it is important that you contact the office.   DRAINAGE: A small amount is normal for the first 48-72 hours. If wounds continue to drain after this time (requiring multiple gauze changes per day), please contact the office.   NUMBNESS: Around the incision is common.   BRUISING: Is common and often tracks down the arm or leg due to gravity and results in an alarming appearance, but is common and will resolve with time.   FEVER: Low-grade fevers (less than 101.5°F) are common during the first week after surgery. You should drink plenty of fluids and breathe deeply.   CONSTIPATION: Post-operative pain medications as well as immobility can lead to constipation. Please consider taking an over the counter stool softener such as Colace and/or Senna if you experience constipation or if you have a history of constipation.    Follow-Up   A Follow-up appointment should be arranged for 10-14 days after surgery. If one has not  been provided, please call the office to schedule.       NOTIFY US IMMEDIATELY FOR ANY OF THE FOLLOWING:   Most orthopedic surgical procedures are uneventful. However, complications can occur. The following are things to be aware of in the immediate postoperative period.   FEVER - Temperature rises above 101.5°F or associated chills/sweats   WOUND - If you notice drainage more than 4 days after surgery, if the drainage turns yellows and foul smelling, if you need to change gauze multiple times per day, or if sutures become loose.   CARDIOVASCULAR - Chest pain, shortness of breath, palpitations, or fainting spells must be taken seriously. Go to the emergency room (or call 911) immediately for evaluation.   BLOOD CLOTS - Orthopaedic surgery patients are at risk for blood clots. While the risk is higher for lower extremity surgery, even those who have undergone upper extremity surgery are at an increased risk. Please notify Dr. Osorio if you or someone in your family has had blood clots or any type of known clotting disorder. Signs of blood clots may include calf pain or cramping, diffuse swelling in the leg and foot, or chest pain and shortness of breath. Please call the office or go to the hospital if you recognize any of these symptoms.   NAUSEA - If you have severe vomiting, diarrhea, or constipation, or cannot keep any liquid down   URINARY RETENTION - If you cannot urinate the night after surgery, please go to the Emergency Room.

## 2024-12-11 ENCOUNTER — LAB (OUTPATIENT)
Dept: LAB | Facility: LAB | Age: 69
End: 2024-12-11
Payer: COMMERCIAL

## 2024-12-11 DIAGNOSIS — Z12.5 SCREENING FOR PROSTATE CANCER: ICD-10-CM

## 2024-12-11 DIAGNOSIS — R73.01 IFG (IMPAIRED FASTING GLUCOSE): ICD-10-CM

## 2024-12-11 DIAGNOSIS — Z00.00 MEDICARE ANNUAL WELLNESS VISIT, SUBSEQUENT: ICD-10-CM

## 2024-12-11 DIAGNOSIS — Z23 NEEDS FLU SHOT: ICD-10-CM

## 2024-12-11 DIAGNOSIS — N40.1 BENIGN PROSTATIC HYPERPLASIA WITH URINARY HESITANCY: ICD-10-CM

## 2024-12-11 DIAGNOSIS — E78.2 MIXED HYPERLIPIDEMIA: ICD-10-CM

## 2024-12-11 DIAGNOSIS — R39.11 BENIGN PROSTATIC HYPERPLASIA WITH URINARY HESITANCY: ICD-10-CM

## 2024-12-11 LAB
ALBUMIN SERPL BCP-MCNC: 4.3 G/DL (ref 3.4–5)
ALP SERPL-CCNC: 45 U/L (ref 33–136)
ALT SERPL W P-5'-P-CCNC: 24 U/L (ref 10–52)
ANION GAP SERPL CALC-SCNC: 11 MMOL/L (ref 10–20)
AST SERPL W P-5'-P-CCNC: 22 U/L (ref 9–39)
BASOPHILS # BLD AUTO: 0.09 X10*3/UL (ref 0–0.1)
BASOPHILS NFR BLD AUTO: 2 %
BILIRUB SERPL-MCNC: 0.6 MG/DL (ref 0–1.2)
BUN SERPL-MCNC: 22 MG/DL (ref 6–23)
CALCIUM SERPL-MCNC: 9.2 MG/DL (ref 8.6–10.3)
CHLORIDE SERPL-SCNC: 105 MMOL/L (ref 98–107)
CHOLEST SERPL-MCNC: 208 MG/DL (ref 0–199)
CHOLESTEROL/HDL RATIO: 3.7
CO2 SERPL-SCNC: 28 MMOL/L (ref 21–32)
CREAT SERPL-MCNC: 0.9 MG/DL (ref 0.5–1.3)
EGFRCR SERPLBLD CKD-EPI 2021: >90 ML/MIN/1.73M*2
EOSINOPHIL # BLD AUTO: 0.45 X10*3/UL (ref 0–0.7)
EOSINOPHIL NFR BLD AUTO: 9.9 %
ERYTHROCYTE [DISTWIDTH] IN BLOOD BY AUTOMATED COUNT: 12.4 % (ref 11.5–14.5)
EST. AVERAGE GLUCOSE BLD GHB EST-MCNC: 108 MG/DL
GLUCOSE SERPL-MCNC: 94 MG/DL (ref 74–99)
HBA1C MFR BLD: 5.4 %
HCT VFR BLD AUTO: 48.2 % (ref 41–52)
HDLC SERPL-MCNC: 55.8 MG/DL
HGB BLD-MCNC: 15.8 G/DL (ref 13.5–17.5)
IMM GRANULOCYTES # BLD AUTO: 0.02 X10*3/UL (ref 0–0.7)
IMM GRANULOCYTES NFR BLD AUTO: 0.4 % (ref 0–0.9)
LDLC SERPL CALC-MCNC: 126 MG/DL
LYMPHOCYTES # BLD AUTO: 1.32 X10*3/UL (ref 1.2–4.8)
LYMPHOCYTES NFR BLD AUTO: 28.9 %
MCH RBC QN AUTO: 32.4 PG (ref 26–34)
MCHC RBC AUTO-ENTMCNC: 32.8 G/DL (ref 32–36)
MCV RBC AUTO: 99 FL (ref 80–100)
MONOCYTES # BLD AUTO: 0.43 X10*3/UL (ref 0.1–1)
MONOCYTES NFR BLD AUTO: 9.4 %
NEUTROPHILS # BLD AUTO: 2.25 X10*3/UL (ref 1.2–7.7)
NEUTROPHILS NFR BLD AUTO: 49.4 %
NON HDL CHOLESTEROL: 152 MG/DL (ref 0–149)
NRBC BLD-RTO: 0 /100 WBCS (ref 0–0)
PLATELET # BLD AUTO: 200 X10*3/UL (ref 150–450)
POTASSIUM SERPL-SCNC: 4.6 MMOL/L (ref 3.5–5.3)
PROT SERPL-MCNC: 6.5 G/DL (ref 6.4–8.2)
PSA SERPL-MCNC: 2.44 NG/ML
RBC # BLD AUTO: 4.87 X10*6/UL (ref 4.5–5.9)
SODIUM SERPL-SCNC: 139 MMOL/L (ref 136–145)
TRIGL SERPL-MCNC: 131 MG/DL (ref 0–149)
VLDL: 26 MG/DL (ref 0–40)
WBC # BLD AUTO: 4.6 X10*3/UL (ref 4.4–11.3)

## 2024-12-11 PROCEDURE — 80061 LIPID PANEL: CPT

## 2024-12-11 PROCEDURE — 83036 HEMOGLOBIN GLYCOSYLATED A1C: CPT

## 2024-12-11 PROCEDURE — 84153 ASSAY OF PSA TOTAL: CPT

## 2024-12-11 PROCEDURE — 85025 COMPLETE CBC W/AUTO DIFF WBC: CPT

## 2024-12-11 PROCEDURE — 80053 COMPREHEN METABOLIC PANEL: CPT

## 2024-12-11 PROCEDURE — 36415 COLL VENOUS BLD VENIPUNCTURE: CPT

## 2024-12-17 ENCOUNTER — ANESTHESIA (OUTPATIENT)
Dept: OPERATING ROOM | Facility: HOSPITAL | Age: 69
End: 2024-12-17
Payer: COMMERCIAL

## 2024-12-17 ENCOUNTER — ANESTHESIA EVENT (OUTPATIENT)
Dept: OPERATING ROOM | Facility: HOSPITAL | Age: 69
End: 2024-12-17
Payer: COMMERCIAL

## 2024-12-17 ENCOUNTER — HOSPITAL ENCOUNTER (OUTPATIENT)
Facility: HOSPITAL | Age: 69
Setting detail: OUTPATIENT SURGERY
Discharge: HOME | End: 2024-12-17
Attending: STUDENT IN AN ORGANIZED HEALTH CARE EDUCATION/TRAINING PROGRAM | Admitting: STUDENT IN AN ORGANIZED HEALTH CARE EDUCATION/TRAINING PROGRAM
Payer: COMMERCIAL

## 2024-12-17 VITALS
TEMPERATURE: 97.3 F | OXYGEN SATURATION: 99 % | WEIGHT: 202 LBS | BODY MASS INDEX: 29.92 KG/M2 | SYSTOLIC BLOOD PRESSURE: 126 MMHG | HEART RATE: 81 BPM | HEIGHT: 69 IN | RESPIRATION RATE: 14 BRPM | DIASTOLIC BLOOD PRESSURE: 85 MMHG

## 2024-12-17 DIAGNOSIS — S43.432A SUPERIOR LABRUM ANTERIOR-TO-POSTERIOR (SLAP) TEAR OF LEFT SHOULDER: ICD-10-CM

## 2024-12-17 DIAGNOSIS — M65.912 SYNOVITIS OF LEFT SHOULDER: Primary | ICD-10-CM

## 2024-12-17 DIAGNOSIS — M25.812 SHOULDER IMPINGEMENT, LEFT: ICD-10-CM

## 2024-12-17 DIAGNOSIS — M75.22 BICEPS TENDINITIS, LEFT: ICD-10-CM

## 2024-12-17 PROCEDURE — 7100000010 HC PHASE TWO TIME - EACH INCREMENTAL 1 MINUTE: Performed by: STUDENT IN AN ORGANIZED HEALTH CARE EDUCATION/TRAINING PROGRAM

## 2024-12-17 PROCEDURE — 23430 REPAIR BICEPS TENDON: CPT

## 2024-12-17 PROCEDURE — 7100000001 HC RECOVERY ROOM TIME - INITIAL BASE CHARGE: Performed by: STUDENT IN AN ORGANIZED HEALTH CARE EDUCATION/TRAINING PROGRAM

## 2024-12-17 PROCEDURE — 3600000009 HC OR TIME - EACH INCREMENTAL 1 MINUTE - PROCEDURE LEVEL FOUR: Performed by: STUDENT IN AN ORGANIZED HEALTH CARE EDUCATION/TRAINING PROGRAM

## 2024-12-17 PROCEDURE — 2780000003 HC OR 278 NO HCPCS: Performed by: STUDENT IN AN ORGANIZED HEALTH CARE EDUCATION/TRAINING PROGRAM

## 2024-12-17 PROCEDURE — 2500000005 HC RX 250 GENERAL PHARMACY W/O HCPCS

## 2024-12-17 PROCEDURE — 2500000001 HC RX 250 WO HCPCS SELF ADMINISTERED DRUGS (ALT 637 FOR MEDICARE OP)

## 2024-12-17 PROCEDURE — C1713 ANCHOR/SCREW BN/BN,TIS/BN: HCPCS | Performed by: STUDENT IN AN ORGANIZED HEALTH CARE EDUCATION/TRAINING PROGRAM

## 2024-12-17 PROCEDURE — 29826 SHO ARTHRS SRG DECOMPRESSION: CPT

## 2024-12-17 PROCEDURE — 2500000004 HC RX 250 GENERAL PHARMACY W/ HCPCS (ALT 636 FOR OP/ED): Performed by: STUDENT IN AN ORGANIZED HEALTH CARE EDUCATION/TRAINING PROGRAM

## 2024-12-17 PROCEDURE — 3600000004 HC OR TIME - INITIAL BASE CHARGE - PROCEDURE LEVEL FOUR: Performed by: STUDENT IN AN ORGANIZED HEALTH CARE EDUCATION/TRAINING PROGRAM

## 2024-12-17 PROCEDURE — 2720000007 HC OR 272 NO HCPCS: Performed by: STUDENT IN AN ORGANIZED HEALTH CARE EDUCATION/TRAINING PROGRAM

## 2024-12-17 PROCEDURE — 23430 REPAIR BICEPS TENDON: CPT | Performed by: STUDENT IN AN ORGANIZED HEALTH CARE EDUCATION/TRAINING PROGRAM

## 2024-12-17 PROCEDURE — 3700000001 HC GENERAL ANESTHESIA TIME - INITIAL BASE CHARGE: Performed by: STUDENT IN AN ORGANIZED HEALTH CARE EDUCATION/TRAINING PROGRAM

## 2024-12-17 PROCEDURE — 7100000009 HC PHASE TWO TIME - INITIAL BASE CHARGE: Performed by: STUDENT IN AN ORGANIZED HEALTH CARE EDUCATION/TRAINING PROGRAM

## 2024-12-17 PROCEDURE — 29823 SHO ARTHRS SRG XTNSV DBRDMT: CPT | Performed by: STUDENT IN AN ORGANIZED HEALTH CARE EDUCATION/TRAINING PROGRAM

## 2024-12-17 PROCEDURE — 2500000004 HC RX 250 GENERAL PHARMACY W/ HCPCS (ALT 636 FOR OP/ED)

## 2024-12-17 PROCEDURE — 29823 SHO ARTHRS SRG XTNSV DBRDMT: CPT

## 2024-12-17 PROCEDURE — A4649 SURGICAL SUPPLIES: HCPCS | Performed by: STUDENT IN AN ORGANIZED HEALTH CARE EDUCATION/TRAINING PROGRAM

## 2024-12-17 PROCEDURE — 29827 SHO ARTHRS SRG RT8TR CUF RPR: CPT | Performed by: STUDENT IN AN ORGANIZED HEALTH CARE EDUCATION/TRAINING PROGRAM

## 2024-12-17 PROCEDURE — 2500000004 HC RX 250 GENERAL PHARMACY W/ HCPCS (ALT 636 FOR OP/ED): Mod: JZ

## 2024-12-17 PROCEDURE — 2500000005 HC RX 250 GENERAL PHARMACY W/O HCPCS: Performed by: STUDENT IN AN ORGANIZED HEALTH CARE EDUCATION/TRAINING PROGRAM

## 2024-12-17 PROCEDURE — 29826 SHO ARTHRS SRG DECOMPRESSION: CPT | Performed by: STUDENT IN AN ORGANIZED HEALTH CARE EDUCATION/TRAINING PROGRAM

## 2024-12-17 PROCEDURE — 7100000002 HC RECOVERY ROOM TIME - EACH INCREMENTAL 1 MINUTE: Performed by: STUDENT IN AN ORGANIZED HEALTH CARE EDUCATION/TRAINING PROGRAM

## 2024-12-17 PROCEDURE — 3700000002 HC GENERAL ANESTHESIA TIME - EACH INCREMENTAL 1 MINUTE: Performed by: STUDENT IN AN ORGANIZED HEALTH CARE EDUCATION/TRAINING PROGRAM

## 2024-12-17 PROCEDURE — 29827 SHO ARTHRS SRG RT8TR CUF RPR: CPT

## 2024-12-17 PROCEDURE — C1781 MESH (IMPLANTABLE): HCPCS | Performed by: STUDENT IN AN ORGANIZED HEALTH CARE EDUCATION/TRAINING PROGRAM

## 2024-12-17 DEVICE — IMPLANTABLE DEVICE: Type: IMPLANTABLE DEVICE | Site: SHOULDER | Status: FUNCTIONAL

## 2024-12-17 DEVICE — BONE ANCHORS 3, WITH ARTHRO DELIVERY SYS ADVANCED: Type: IMPLANTABLE DEVICE | Site: SHOULDER | Status: FUNCTIONAL

## 2024-12-17 DEVICE — ANCHORS, TENDON 8: Type: IMPLANTABLE DEVICE | Site: SHOULDER | Status: FUNCTIONAL

## 2024-12-17 RX ORDER — GABAPENTIN 300 MG/1
300 CAPSULE ORAL ONCE
Status: COMPLETED | OUTPATIENT
Start: 2024-12-17 | End: 2024-12-17

## 2024-12-17 RX ORDER — FENTANYL CITRATE 50 UG/ML
50 INJECTION, SOLUTION INTRAMUSCULAR; INTRAVENOUS ONCE
Status: COMPLETED | OUTPATIENT
Start: 2024-12-17 | End: 2024-12-17

## 2024-12-17 RX ORDER — ACETAMINOPHEN 325 MG/1
975 TABLET ORAL ONCE
Status: COMPLETED | OUTPATIENT
Start: 2024-12-17 | End: 2024-12-17

## 2024-12-17 RX ORDER — ONDANSETRON HYDROCHLORIDE 2 MG/ML
4 INJECTION, SOLUTION INTRAVENOUS ONCE AS NEEDED
Status: DISCONTINUED | OUTPATIENT
Start: 2024-12-17 | End: 2024-12-17 | Stop reason: HOSPADM

## 2024-12-17 RX ORDER — OXYCODONE HYDROCHLORIDE 5 MG/1
5 TABLET ORAL EVERY 4 HOURS PRN
Qty: 15 TABLET | Refills: 0 | Status: SHIPPED | OUTPATIENT
Start: 2024-12-17 | End: 2024-12-20

## 2024-12-17 RX ORDER — PROPOFOL 10 MG/ML
INJECTION, EMULSION INTRAVENOUS AS NEEDED
Status: DISCONTINUED | OUTPATIENT
Start: 2024-12-17 | End: 2024-12-17

## 2024-12-17 RX ORDER — ACETAMINOPHEN 500 MG
1000 TABLET ORAL EVERY 8 HOURS PRN
Qty: 60 TABLET | Refills: 1 | Status: SHIPPED | OUTPATIENT
Start: 2024-12-17 | End: 2025-01-06

## 2024-12-17 RX ORDER — SODIUM CHLORIDE, SODIUM LACTATE, POTASSIUM CHLORIDE, CALCIUM CHLORIDE 600; 310; 30; 20 MG/100ML; MG/100ML; MG/100ML; MG/100ML
40 INJECTION, SOLUTION INTRAVENOUS CONTINUOUS
Status: DISCONTINUED | OUTPATIENT
Start: 2024-12-17 | End: 2024-12-17 | Stop reason: HOSPADM

## 2024-12-17 RX ORDER — ONDANSETRON 4 MG/1
4 TABLET, FILM COATED ORAL EVERY 8 HOURS PRN
Qty: 20 TABLET | Refills: 0 | Status: SHIPPED | OUTPATIENT
Start: 2024-12-17 | End: 2024-12-24

## 2024-12-17 RX ORDER — HYDROMORPHONE HYDROCHLORIDE 0.2 MG/ML
0.2 INJECTION INTRAMUSCULAR; INTRAVENOUS; SUBCUTANEOUS EVERY 5 MIN PRN
Status: DISCONTINUED | OUTPATIENT
Start: 2024-12-17 | End: 2024-12-17 | Stop reason: HOSPADM

## 2024-12-17 RX ORDER — MEPERIDINE HYDROCHLORIDE 25 MG/ML
12.5 INJECTION INTRAMUSCULAR; INTRAVENOUS; SUBCUTANEOUS EVERY 10 MIN PRN
Status: DISCONTINUED | OUTPATIENT
Start: 2024-12-17 | End: 2024-12-17 | Stop reason: HOSPADM

## 2024-12-17 RX ORDER — ONDANSETRON HYDROCHLORIDE 2 MG/ML
INJECTION, SOLUTION INTRAVENOUS AS NEEDED
Status: DISCONTINUED | OUTPATIENT
Start: 2024-12-17 | End: 2024-12-17

## 2024-12-17 RX ORDER — BUPIVACAINE HYDROCHLORIDE 2.5 MG/ML
INJECTION, SOLUTION INFILTRATION; PERINEURAL AS NEEDED
Status: DISCONTINUED | OUTPATIENT
Start: 2024-12-17 | End: 2024-12-17 | Stop reason: HOSPADM

## 2024-12-17 RX ORDER — MIDAZOLAM HYDROCHLORIDE 1 MG/ML
2 INJECTION, SOLUTION INTRAMUSCULAR; INTRAVENOUS ONCE
Status: COMPLETED | OUTPATIENT
Start: 2024-12-17 | End: 2024-12-17

## 2024-12-17 RX ORDER — HYDROMORPHONE HYDROCHLORIDE 2 MG/ML
INJECTION, SOLUTION INTRAMUSCULAR; INTRAVENOUS; SUBCUTANEOUS AS NEEDED
Status: DISCONTINUED | OUTPATIENT
Start: 2024-12-17 | End: 2024-12-17

## 2024-12-17 RX ORDER — CEFAZOLIN SODIUM 2 G/100ML
2 INJECTION, SOLUTION INTRAVENOUS ONCE
Status: COMPLETED | OUTPATIENT
Start: 2024-12-17 | End: 2024-12-17

## 2024-12-17 RX ORDER — IPRATROPIUM BROMIDE 0.5 MG/2.5ML
500 SOLUTION RESPIRATORY (INHALATION) EVERY 30 MIN PRN
Status: DISCONTINUED | OUTPATIENT
Start: 2024-12-17 | End: 2024-12-17 | Stop reason: HOSPADM

## 2024-12-17 RX ORDER — LIDOCAINE HYDROCHLORIDE 10 MG/ML
INJECTION, SOLUTION EPIDURAL; INFILTRATION; INTRACAUDAL; PERINEURAL AS NEEDED
Status: DISCONTINUED | OUTPATIENT
Start: 2024-12-17 | End: 2024-12-17

## 2024-12-17 RX ORDER — ALBUTEROL SULFATE 0.83 MG/ML
2.5 SOLUTION RESPIRATORY (INHALATION) EVERY 30 MIN PRN
Status: DISCONTINUED | OUTPATIENT
Start: 2024-12-17 | End: 2024-12-17 | Stop reason: HOSPADM

## 2024-12-17 RX ORDER — CELECOXIB 200 MG/1
200 CAPSULE ORAL ONCE
Status: COMPLETED | OUTPATIENT
Start: 2024-12-17 | End: 2024-12-17

## 2024-12-17 RX ORDER — ASPIRIN 81 MG/1
81 TABLET ORAL 2 TIMES DAILY
Qty: 30 TABLET | Refills: 0 | Status: SHIPPED | OUTPATIENT
Start: 2024-12-17 | End: 2025-01-01

## 2024-12-17 RX ORDER — FENTANYL CITRATE 50 UG/ML
50 INJECTION, SOLUTION INTRAMUSCULAR; INTRAVENOUS EVERY 5 MIN PRN
Status: DISCONTINUED | OUTPATIENT
Start: 2024-12-17 | End: 2024-12-17 | Stop reason: HOSPADM

## 2024-12-17 RX ORDER — LABETALOL HYDROCHLORIDE 5 MG/ML
5 INJECTION, SOLUTION INTRAVENOUS EVERY 5 MIN PRN
Status: DISCONTINUED | OUTPATIENT
Start: 2024-12-17 | End: 2024-12-17 | Stop reason: HOSPADM

## 2024-12-17 RX ORDER — FENTANYL CITRATE 50 UG/ML
INJECTION, SOLUTION INTRAMUSCULAR; INTRAVENOUS AS NEEDED
Status: DISCONTINUED | OUTPATIENT
Start: 2024-12-17 | End: 2024-12-17

## 2024-12-17 RX ORDER — NORETHINDRONE AND ETHINYL ESTRADIOL 0.5-0.035
KIT ORAL AS NEEDED
Status: DISCONTINUED | OUTPATIENT
Start: 2024-12-17 | End: 2024-12-17

## 2024-12-17 SDOH — HEALTH STABILITY: MENTAL HEALTH: CURRENT SMOKER: 0

## 2024-12-17 ASSESSMENT — PAIN - FUNCTIONAL ASSESSMENT
PAIN_FUNCTIONAL_ASSESSMENT: 0-10

## 2024-12-17 ASSESSMENT — PAIN SCALES - GENERAL
PAINLEVEL_OUTOF10: 0 - NO PAIN
PAINLEVEL_OUTOF10: 3
PAINLEVEL_OUTOF10: 0 - NO PAIN

## 2024-12-17 ASSESSMENT — COLUMBIA-SUICIDE SEVERITY RATING SCALE - C-SSRS
6. HAVE YOU EVER DONE ANYTHING, STARTED TO DO ANYTHING, OR PREPARED TO DO ANYTHING TO END YOUR LIFE?: NO
1. IN THE PAST MONTH, HAVE YOU WISHED YOU WERE DEAD OR WISHED YOU COULD GO TO SLEEP AND NOT WAKE UP?: NO
2. HAVE YOU ACTUALLY HAD ANY THOUGHTS OF KILLING YOURSELF?: NO

## 2024-12-17 ASSESSMENT — PAIN DESCRIPTION - DESCRIPTORS: DESCRIPTORS: ACHING

## 2024-12-17 NOTE — ANESTHESIA POSTPROCEDURE EVALUATION
Patient: Bony Fiore Sr.    Procedure Summary       Date: 12/17/24 Room / Location: [facility] 05 / [facility]    Anesthesia Start: 1212 Anesthesia Stop: 1426    Procedure: Left shoulder arthroscopy, rotator cuff repair, subacromial decompression and acromioplasty, intra-articular debridement and synovectomy and open subpectoral biceps tenodesis (Left: Shoulder) Diagnosis:       Traumatic complete tear of left rotator cuff, subsequent encounter      Superior labrum anterior-to-posterior (SLAP) tear of left shoulder      Biceps tendinitis, left      Shoulder impingement, left      Synovitis of left shoulder      (Traumatic complete tear of left rotator cuff, subsequent encounter [S46.012D])      (Superior labrum anterior-to-posterior (SLAP) tear of left shoulder [S43.432A])      (Biceps tendinitis, left [M75.22])      (Shoulder impingement, left [M25.812])      (Synovitis of left shoulder [M65.912])    Surgeons: Sean Osorio MD Responsible Provider: Willie Willard DO    Anesthesia Type: general, regional ASA Status: 2            Anesthesia Type: general, regional    Vitals Value Taken Time   /77 12/17/24 1428   Temp 36.2 12/17/24 1428   Pulse 68 12/17/24 1428   Resp 15 12/17/24 1428   SpO2 98 12/17/24 1428       Anesthesia Post Evaluation    Patient location during evaluation: PACU  Patient participation: waiting for patient participation  Level of consciousness: sleepy but conscious  Pain management: adequate  Airway patency: patent  Cardiovascular status: hemodynamically stable and acceptable  Respiratory status: face mask and spontaneous ventilation  Hydration status: acceptable  Postoperative Nausea and Vomiting: none        There were no known notable events for this encounter.

## 2024-12-17 NOTE — BRIEF OP NOTE
Date: 2024  OR Location: [facility]    Name: Bony Fiore Sr., : 1955, Age: 69 y.o., MRN: [id number], Sex: male    Diagnosis  Pre-op Diagnosis      * Traumatic complete tear of left rotator cuff, subsequent encounter [S46.012D]     * Superior labrum anterior-to-posterior (SLAP) tear of left shoulder [S43.432A]     * Biceps tendinitis, left [M75.22]     * Shoulder impingement, left [M25.812]     * Synovitis of left shoulder [M65.912] Post-op Diagnosis     * Traumatic complete tear of left rotator cuff, subsequent encounter [S46.012D]     * Superior labrum anterior-to-posterior (SLAP) tear of left shoulder [S43.432A]     * Biceps tendinitis, left [M75.22]     * Shoulder impingement, left [M25.812]     * Synovitis of left shoulder [M65.912]     Procedures  1.  Left shoulder arthroscopy, rotator cuff repair of massive tear augmented with Regeneten (22 modifier)  2.  Left shoulder arthroscopic subacromial decompression and acromioplasty  3.  Left shoulder arthroscopic extensive intra-articular debridement and synovectomy  4.  Left shoulder open subpectoral biceps tenodesis    Surgeons      * Sean Osorio - Primary    Resident/Fellow/Other Assistant:  Surgeons and Role:     * Gabrielle Lopresti, PA-C - [facility]    Staff:   Circulator: Jimena Hernandezub Person: Cici  Scrub Person: Stephanie Hernandezub Person: Kalyn    Anesthesia Staff: Anesthesiologist: Willie Willard DO  C-AA: BRIAN Francisco    Procedure Summary  Anesthesia: Regional, General  ASA: II  Estimated Blood Loss: 5mL  Intra-op Medications:   Administrations occurring from 1205 to 1410 on 24:   Medication Name Total Dose   EPINEPHrine (Adrenalin) 1 mg/mL 2 mg in sodium chloride 0.9 % 6,000 mL irrigation 6,002 mL   dexAMETHasone (Decadron) injection 4 mg/mL 4 mg   ePHEDrine injection 35 mg   fentaNYL (Sublimaze) injection 50 mcg/mL 100 mcg   HYDROmorphone (Dilaudid) injection 2 mg/mL 0.5 mg   LR bolus Cannot be calculated   lidocaine PF  (Xylocaine-MPF) local injection 1 % 5 mL   ondansetron (Zofran) 2 mg/mL injection 4 mg   propofol (Diprivan) injection 10 mg/mL 180 mg   ceFAZolin (Ancef) 2 g in dextrose (iso)  mL 2 g          Anesthesia Record               Intraprocedure I/O Totals       None           Specimen: No specimens collected     Findings: Complete retracted massive full-thickness rotator cuff tear involving the subscapularis, supraspinatus and infraspinatus, long head biceps tendon tearing and tenosynovitis, nondisplaced degenerative SLAP tear, glenohumeral synovitis, subacromial impingement/bursitis    Complications:  None; patient tolerated the procedure well.     Disposition: PACU - hemodynamically stable.  Condition: stable  Specimens Collected: No specimens collected  Attending Attestation: I performed the procedure.    Sean Osorio  Phone Number: [phone]

## 2024-12-17 NOTE — OP NOTE
Left shoulder arthroscopy, rotator cuff repair, subacromial decompression and acromioplasty, intra-articular debridement and synovectomy and open subpectoral biceps tenodesis (L) Operative Note     Date: 2024  OR Location: [facility]    Name: Bony Fiore Sr., : 1955, Age: 69 y.o., MRN: [id number], Sex: male    Diagnosis  Pre-op Diagnosis      * Traumatic complete tear of left rotator cuff, subsequent encounter [S46.012D]     * Superior labrum anterior-to-posterior (SLAP) tear of left shoulder [S43.432A]     * Biceps tendinitis, left [M75.22]     * Shoulder impingement, left [M25.812]     * Synovitis of left shoulder [M65.912] Post-op Diagnosis     * Traumatic complete tear of left rotator cuff, subsequent encounter [S46.012D]     * Superior labrum anterior-to-posterior (SLAP) tear of left shoulder [S43.432A]     * Biceps tendinitis, left [M75.22]     * Shoulder impingement, left [M25.812]     * Synovitis of left shoulder [M65.912]     Procedures  1.  Left shoulder arthroscopy, rotator cuff repair of massive tear augmented with Regeneten (22 modifier)  2.  Left shoulder arthroscopic subacromial decompression and acromioplasty  3.  Left shoulder arthroscopic extensive intra-articular debridement and synovectomy  4.  Left shoulder open subpectoral biceps tenodesis    Surgeons      * Sean Osorio - Primary    Resident/Fellow/Other Assistant:  Surgeons and Role:     * Gabrielle Lopresti, PA-C - [facility] Assist    Staff:   Circulator: Jimena  Scrub Person: Cici  Scrub Person: Stephanie Hernandezub Person: Kalyn    Anesthesia Staff: Anesthesiologist: Willie Willard DO  C-AA: BRIAN Francisco    Procedure Summary  Anesthesia: Regional, General  ASA: II  Estimated Blood Loss: 5mL  Intra-op Medications:   Administrations occurring from 1205 to 1410 on 24:   Medication Name Total Dose   EPINEPHrine (Adrenalin) 1 mg/mL 2 mg in sodium chloride 0.9 % 6,000 mL irrigation 6,002 mL   dexAMETHasone (Decadron)  injection 4 mg/mL 4 mg   ePHEDrine injection 35 mg   fentaNYL (Sublimaze) injection 50 mcg/mL 100 mcg   HYDROmorphone (Dilaudid) injection 2 mg/mL 0.5 mg   LR bolus Cannot be calculated   lidocaine PF (Xylocaine-MPF) local injection 1 % 5 mL   ondansetron (Zofran) 2 mg/mL injection 4 mg   propofol (Diprivan) injection 10 mg/mL 180 mg   ceFAZolin (Ancef) 2 g in dextrose (iso)  mL 2 g          Anesthesia Record               Intraprocedure I/O Totals       None           Specimen: No specimens collected      Drains and/or Catheters: * None in log *    Tourniquet Times:         Implants:  Implants       Type Name Action Serial No.       4.75mm BioComposite Knotless SwiveLock [place], elf Punching with Blue #2 Suture Implanted       4.75mm Suture [place] with THREE MINITAPE Sutures (Blue, Cobraid White, Cobraid Blue) Implanted       HS-FIBER ULTRALOOP WHITE/BLUE Implanted       HS-FIBER ULTRALOOP BLUE Implanted      Implant BONE ANCHORS 3, WITH ARTHRO DELIVERY SYS ADVANCED - [id number] Implanted      Implant ANCHORS, TENDON 8 - [id number] Implanted      Implant DELIVERY SYSTEM, ARTHROSCOPIC, BIO INDUCTIVE, MEDIUM - [id number] Implanted       2.8mm Q-FIX All-Suture [place] with TWO MINITAPE Sutures (Blue, Cobraid White) Implanted             Findings: Complete retracted massive full-thickness rotator cuff tear involving the subscapularis, supraspinatus and infraspinatus, long head biceps tendon tearing and tenosynovitis, nondisplaced degenerative SLAP tear, glenohumeral synovitis, subacromial impingement/bursitis    Indications: Bony Fiore Sr. is an 69 y.o. male who is having surgery for left shoulder full-thickness rotator cuff tear, degenerative SLAP tear, long head biceps tendon tearing and biceps tenosynovitis, subacromial impingement/bursitis and glenohumeral synovitis.  MRI confirmed the above.  He failed initial nonoperative management.  After long discussion with the patient, he elected to proceed  with surgical intervention of form of a left shoulder arthroscopy, rotator cuff repair, subacromial decompression and acromioplasty, intra-articular debridement and synovectomy, open subpectoral biceps tenodesis.  I thoroughly explained the risks and benefits as well as the expected postoperative timeline for the proposed procedure versus nonoperative management. Risks of this procedure include but are not limited to bleeding, infection, nerve injury, DVT and failure of repair or implant.  The patient expressed understanding and wished to proceed with surgical intervention.  All questions were answered. They were consented to the above procedure at bedside.    The patient was seen in the preoperative area. The risks, benefits, complications, treatment options, non-operative alternatives, expected recovery and outcomes were discussed with the patient. The possibilities of reaction to medication, pulmonary aspiration, injury to surrounding structures, bleeding, recurrent infection, the need for additional procedures, failure to diagnose a condition, and creating a complication requiring transfusion or operation were discussed with the patient. The patient concurred with the proposed plan, giving informed consent.  The site of surgery was properly noted/marked if necessary per policy. The patient has been actively warmed in preoperative area. Preoperative antibiotics have been ordered and given within 1 hours of incision. Venous thrombosis prophylaxis have been ordered including bilateral sequential compression devices    Procedure Details:   The patient was seen in the preoperative holding area where the correct site and side were signed my initials. The patient was seen by the anesthesia team and a preoperative regional anesthetic block was administered.  The patient was brought back to the operating room after smooth induction of LMA anesthesia the patient was placed in the beach chair position.  All bony  prominences were padded.  The patient was belted and posted.  SCD boots were placed on bilateral lower extremities.  The contralateral arm was placed in a well-padded arm bullard.  The operative arm was prepped and draped in usual sterile fashion and placed in the pneumatic arm bullard.  Prior to the start of the procedure, preoperative antibiotics were administered by the anesthesia team.  Prior to incision, a preoperative time-out was performed confirming the correct patient, side, site and procedure to be performed.  All in the room were in agreement.    We began the procedure with the standard posterior portal to the shoulder.  Local anesthetic was administered at the posterior portal site and the posterior portal was established with a 11. Blade through the skin.  The arthroscope was introduced atraumatically into the glenohumeral joint. An anterior portal was established using an outside in technique with spinal needle.  The skin was incised with a #11 blade and a threaded cannula was inserted atraumatically. Diagnostic arthroscopy was performed demonstrating intact chondral surfaces on the glenoid and the humeral head.  There was a massive tear of the rotator cuff involving the subscapularis, supraspinatus and anterior aspect of the infraspinatus with retraction back to the level of the humeral head.  This was repaired as described below.  The long head of the biceps tendon demonstrated severe tendinosis and longitudinal tearing.  The biceps tendon was tenotomized for later tenodesis.  The labrum demonstrated circumferential degenerative fraying.    An extensive intra-articular debridement and synovectomy was performed in the anterior superior and posterior aspects of the glenohumeral joint, glenoid, glenoid labrum, humeral head, rotator cuff, middle glenohumeral ligament and capsular tissue with a combination of arthroscopic shaver and ablator.    We then proceeded with rotator cuff repair of the  subscapularis.  A gentle decortication of the lesser tuberosity was performed in preparation for repair using arthroscopic shaver.  A gentle debridement of the rotator cuff tendon tissue was performed to roughen the tissue in preparation for repair.  An interval portal was established using a standard outside in technique with introduction of a fully threaded cannula over a switching stick.  Ultra loop sutures x 3 were passed in luggage tag fashion through the subscapularis tendon tissue and secured to the lesser tuberosity footprint using a 4.75 biocomposite swivel lock which was punched and inserted after the sutures were appropriately tensioned.  This demonstrated excellent repair of the subscapularis tendon.    At this point the arthroscope was removed and inserted into the subacromial space.  A lateral portal was established using outside in technique with a spinal needle followed by a sharp incision through the skin with a #11 blade and the arthroscopic trocar to dilate the portal.  There was extensive bursitis, and thus an extensive bursectomy was performed using a combination of the arthroscopic shaver and arthroscopic ablator.  The coracoacromial ligament was elevated off of the anterior acromion revealing an anterior acromial spur.  Using a bone cutting shaver, the subacromial spur was removed back to a flat surface completing the acromioplasty.  The rotator cuff was inspected from the acromial side which demonstrated a full-thickness tear of the supraspinatus extending into the infraspinatus.  A gentle decortication was performed of the greater tuberosity in preparation for repair.  A 4.75 biocomposite Healicoil anchor was punched and inserted along the articular margin.  Sutures were then passed sequentially using a self retrieving suture passing device with good spread in order to cover the entirety of the tear.  Each limb was then tensioned and secured to a lateral row 4.75 biocomposite SwiveLock which  was punched and inserted after the sutures were appropriately tensioned.  This demonstrated excellent double row repair of the massive tear.  The tendon quality demonstrated mild to moderate tendinosis and given the size of the tear, we elected to augment the repair utilizing a Regeneten bio inductive collagen implant.  The implant was positioned appropriately over the rotator cuff tendon tissue and secured with absorbable staples.    Arthroscopic photos were taken throughout.  Excess arthroscopic fluid was drained from the shoulder and the arthroscopic instruments were removed.      Finally attention was turned to the subpec biceps tenodesis.  A 2.5 cm incision in the axilla was made with a 15 blade and blunt dissection was carried out down to the subpec region.  The long head of the biceps tendon was palpated and manually removed from the incision.  A Bovie cautery was used to clear synovitic tissue out of the biceps groove.  Extensive synovitic tissue was removed from the biceps tendon.  A double-loaded Q fix was drilled and inserted in the biceps groove.  1 limb of each suture was passed and [place] fashion through the musculotendinous junction of the biceps after it was appropriately tensioned.  The second limb was passed once and used as a post.  The biceps was then reduced into the groove using a push pull technique and tied. This demonstrated a secure repair of the biceps tendon which was under good tension.  The wound was thoroughly irrigated.  Biceps incision was closed in layers with buried interrupted 2-0 Vicryl suture on the subcuticular layer followed by running 3-0 Monocryl in the skin.  The wound was dressed with Dermabond, Telfa, dry gauze and Tegaderm.    The wounds were thoroughly irrigated.  Portals were closed with buried interrupted 3-0 Monocryl sutures and dressed with Steri-Strips, dry gauze and Tegaderms.  The operative arm was placed in a shoulder sling with an abduction pillow with a  cryotherapy pad placed on the operative shoulder.    At the end of the procedure all needle, lap and instrument counts were correct.    The patient was woken from anesthesia and transferred to the recovery room in stable condition.  The patient tolerated the procedure well.    I was present for all critical portions of this case.    Postoperative instructions:  The patient will remain in a shoulder sling with an abduction pillow at all times except for bathing.  The patient will return to see me in 2 weeks for a routine 2 week postoperative visit. They will begin PT according to the PT protocol appropriate for their surgery.    Gabrielle LoPresti, PA-C was present for the entire case.  Her assistance was necessary and critical to the successful completion of the procedure.  She provided skilled assistance with arm positioning, arthroscope manipulation, implant insertion, retraction and wound closure.  A qualified assistant was not available to perform her portion of the case.    A 22 modifier was added to the rotator cuff repair portion of the case as it was a massive tear with mild to moderate tendinosis of the tendon tissue requiring additional implants, added case complexity and operative time.    Complications:  None; patient tolerated the procedure well.    Disposition: PACU - hemodynamically stable.  Condition: stable     Attending Attestation: I performed the procedure.    Sean Osorio  Phone Number: [phone]

## 2024-12-17 NOTE — ANESTHESIA PREPROCEDURE EVALUATION
Patient: Bony Fiore Sr.    Procedure Information       Date/Time: 03/26/24 0777    Procedure: Right shoulder arthroscopy, rotator cuff repair, intra-articular debridement and synovectomy, subacromial decompression and acromioplasty, open subpectoral biceps tenodesis (Right: Shoulder) - 1hr    Location: [facility] / [facility]    Surgeons: Sean Osorio MD            Relevant Problems   Anesthesia (within normal limits)      Pulmonary (within normal limits)      GI   (+) Gastroesophageal reflux disease      /Renal (within normal limits)      Endocrine (within normal limits)      Hematology (within normal limits)      Musculoskeletal (within normal limits)       Clinical information reviewed:                 No Known Allergies  Past Medical History:   Diagnosis Date    GERD (gastroesophageal reflux disease)     Other conditions influencing health status     Ulcer     Past Surgical History:   Procedure Laterality Date    COLONOSCOPY      NASAL SEPTUM SURGERY  10/26/2015    Nasal Septal Deviation Repair    SHOULDER ARTHROSCOPY Right      Prior to Admission medications    Medication Sig Start Date End Date Taking? Authorizing Provider   cyanocobalamin (Vitamin B-12) 1,000 mcg tablet Take 1 tablet (1,000 mcg) by mouth once daily.   Yes Historical Provider, MD   glucosamine-chondroitin 500-400 mg tablet Take 1 tablet by mouth once daily.   Yes Historical Provider, MD   multivitamin tablet Take 1 tablet by mouth once daily.   Yes Historical Provider, MD   naproxen (Naprosyn) 250 mg tablet Take 2 tablets (500 mg) by mouth 2 times a day as needed for mild pain (1 - 3).   Yes Historical Provider, MD   omega 3-dha-epa-fish oil (Fish OiL) 1,000 mg (120 mg-180 mg) capsule Take 1 capsule (1,000 mg) by mouth once daily.   Yes Historical Provider, MD   red yeast rice 600 mg capsule Take 600 mg by mouth once daily.   Yes Historical Provider, MD   TURMERIC ORAL Take 1 capsule by mouth once daily.   Yes Historical  Provider, MD   acetaminophen (Tylenol) 500 mg tablet Take 2 tablets (1,000 mg) by mouth every 8 hours if needed for mild pain (1 - 3) for up to 20 days. 3/26/24 4/15/24  Gabrielle Lopresti, PA-C   aspirin 81 mg EC tablet Take 1 tablet (81 mg) by mouth 2 times a day for 15 days. 3/26/24 4/10/24  Gabrielle Lopresti, PA-C   ondansetron (Zofran) 4 mg tablet Take 1 tablet (4 mg) by mouth every 8 hours if needed for nausea or vomiting for up to 7 days. 3/26/24 4/2/24  Gabrielle Lopresti, PA-C   oxyCODONE (Roxicodone) 5 mg immediate release tablet Take 1 tablet (5 mg) by mouth every 4 hours if needed for severe pain (7 - 10) or moderate pain (4 - 6) for up to 3 days. 3/26/24 3/29/24  Gabrielle Lopresti, PA-C   fluticasone (Flonase) 50 mcg/actuation nasal spray Administer 2 sprays into each nostril once daily. 8/3/17 3/22/24  Historical Provider, MD   omeprazole (PriLOSEC) 20 mg DR capsule Take 1 capsule (20 mg) by mouth early in the morning.. 11/16/22 3/22/24  Historical Provider, MD NAJERA Detail:  No data recorded       Physical Exam    Airway  Mallampati: II  TM distance: >3 FB     Cardiovascular - normal exam     Dental - normal exam     Pulmonary - normal exam     Abdominal            Anesthesia Plan    History of general anesthesia?: yes  History of complications of general anesthesia?: no    ASA 2     general and regional     The patient is not a current smoker.  Patient was not previously instructed to abstain from smoking on day of procedure.  Patient did not smoke on day of procedure.  Education provided regarding risk of obstructive sleep apnea.  intravenous induction   Anesthetic plan and risks discussed with patient.    Plan discussed with CRNA and CAA.

## 2024-12-30 ENCOUNTER — APPOINTMENT (OUTPATIENT)
Dept: ORTHOPEDIC SURGERY | Facility: CLINIC | Age: 69
End: 2024-12-30
Payer: COMMERCIAL

## 2024-12-30 VITALS — HEIGHT: 69 IN | BODY MASS INDEX: 29.92 KG/M2 | WEIGHT: 202 LBS

## 2024-12-30 DIAGNOSIS — M75.102 TEAR OF LEFT ROTATOR CUFF, UNSPECIFIED TEAR EXTENT, UNSPECIFIED WHETHER TRAUMATIC: Primary | ICD-10-CM

## 2024-12-30 PROCEDURE — 3008F BODY MASS INDEX DOCD: CPT | Performed by: STUDENT IN AN ORGANIZED HEALTH CARE EDUCATION/TRAINING PROGRAM

## 2024-12-30 PROCEDURE — 99024 POSTOP FOLLOW-UP VISIT: CPT | Performed by: STUDENT IN AN ORGANIZED HEALTH CARE EDUCATION/TRAINING PROGRAM

## 2024-12-30 PROCEDURE — 1125F AMNT PAIN NOTED PAIN PRSNT: CPT | Performed by: STUDENT IN AN ORGANIZED HEALTH CARE EDUCATION/TRAINING PROGRAM

## 2024-12-30 PROCEDURE — 1036F TOBACCO NON-USER: CPT | Performed by: STUDENT IN AN ORGANIZED HEALTH CARE EDUCATION/TRAINING PROGRAM

## 2024-12-30 PROCEDURE — 1160F RVW MEDS BY RX/DR IN RCRD: CPT | Performed by: STUDENT IN AN ORGANIZED HEALTH CARE EDUCATION/TRAINING PROGRAM

## 2024-12-30 PROCEDURE — 1123F ACP DISCUSS/DSCN MKR DOCD: CPT | Performed by: STUDENT IN AN ORGANIZED HEALTH CARE EDUCATION/TRAINING PROGRAM

## 2024-12-30 PROCEDURE — 1159F MED LIST DOCD IN RCRD: CPT | Performed by: STUDENT IN AN ORGANIZED HEALTH CARE EDUCATION/TRAINING PROGRAM

## 2024-12-30 ASSESSMENT — PAIN SCALES - GENERAL: PAINLEVEL_OUTOF10: 6

## 2024-12-30 ASSESSMENT — PAIN - FUNCTIONAL ASSESSMENT: PAIN_FUNCTIONAL_ASSESSMENT: 0-10

## 2024-12-30 ASSESSMENT — PAIN DESCRIPTION - DESCRIPTORS: DESCRIPTORS: TIGHTNESS

## 2024-12-30 NOTE — PROGRESS NOTES
PRIMARY CARE PHYSICIAN: Ernie Chaidez MD    ORTHOPAEDIC POSTOPERATIVE VISIT    ASSESSMENT & PLAN    Impression: 69 y.o. male 13 days postop s/p Left shoulder arthroscopy, rotator cuff repair augmented with Regeneten, subacromial decompression and acromioplasty, intra-articular debridement and synovectomy and open subpectoral biceps tenodesis done 12/17/2024.    Plan:   Overall Bony is doing well.  He has some persistent postoperative pain that is controlled with Tylenol.  He may begin performing Codman pendulums out of his sling but otherwise should wear his sling at all times except for bathing.  He will wait to start formal physical therapy until his next postoperative appointment.  We discussed his postoperative precautions and he expressed understanding.  Patient will ice and rest the shoulder as he needs.  He will follow-up with us in 4 weeks.    I reviewed the intraoperative findings with the patient and answered all their questions. I reviewed their postoperative timeline and plan with them. They understand the postoperative precautions and the treatment plan going forward.     Follow-Up: Patient will follow-up in 4 weeks, no x-rays    At the end of the visit, all questions were answered in full. The patient is in agreement with the plan and recommendations. They will call the office with any questions/concerns.      Note dictated with Scrybe software. Completed without full typed error editing and sent to avoid delay.      SUBJECTIVE  CHIEF COMPLAINT:   Chief Complaint   Patient presents with    Left Shoulder - Post-op        HPI: Bony Fiore Sr. is a 69 y.o. patient who is now 13 days postop s/p Left shoulder arthroscopy, rotator cuff repair, subacromial decompression and acromioplasty, intra-articular debridement and synovectomy and open subpectoral biceps tenodesis done 12/17/2024. Overall Bony is doing well. He is taking Tylenol for his persistent post-operative pain  which is mostly significant when laying down. He denies numbness/tingling. He is currently immobilized in a sling. He has no concerns.     REVIEW OF SYSTEMS  Constitutional: See HPI for pain assessment, No significant weight loss, recent trauma  Cardiovascular: No chest pain, shortness of breath  Respiratory: No difficulty breathing, cough  Gastrointestinal: No nausea, vomiting, diarrhea, constipation  Musculoskeletal: Noted in HPI, positive for pain, restricted motion, stiffness  Integumentary: No rashes, easy bruising, redness   Neurological: no numbness or tingling in extremities, no gait disturbances   Psychiatric: No mood changes, memory changes, social issues  Heme/Lymph: no excessive swelling, easy bruising, excessive bleeding  ENT: No hearing changes  Eyes: No vision changes    CURRENT MEDICATIONS:   Current Outpatient Medications   Medication Sig Dispense Refill    acetaminophen (Tylenol) 500 mg tablet Take 2 tablets (1,000 mg) by mouth every 8 hours if needed for mild pain (1 - 3) for up to 20 days. 60 tablet 1    aspirin 81 mg EC tablet Take 1 tablet (81 mg) by mouth 2 times a day for 15 days. 30 tablet 0    aspirin-acetaminophen-caffeine (Excedrin Migraine) 250-250-65 mg tablet Take 1 tablet by mouth every 6 hours if needed for headaches.      cyanocobalamin (Vitamin B-12) 1,000 mcg tablet Take 1 tablet (1,000 mcg) by mouth once daily.      fexofenadine-pseudoephedrine (Allegra-D)  mg 12 hr tablet Take 1 tablet by mouth 2 times a day as needed for allergies. Do not crush, chew, or split.      glucosamine-chondroitin 500-400 mg tablet Take 1 tablet by mouth once daily.      multivitamin tablet Take 1 tablet by mouth once daily.      omega 3-dha-epa-fish oil (Fish OiL) 1,000 mg (120 mg-180 mg) capsule Take 1 capsule (1,000 mg) by mouth once daily.      red yeast rice 600 mg capsule Take 600 mg by mouth once daily.      TURMERIC ORAL Take 1 capsule by mouth once daily.       No current  facility-administered medications for this visit.        OBJECTIVE    PHYSICAL EXAM      12/17/2024    11:42 AM 12/17/2024    11:57 AM 12/17/2024     2:24 PM 12/17/2024     2:40 PM 12/17/2024     2:55 PM 12/17/2024     3:10 PM 12/17/2024     3:26 PM   Vitals   Systolic 139 107 110 113 122 128 126   Diastolic 88 77 78 84 76 86 85   Heart Rate 70 68 68 80 78 71 81   Temp   36 °C (96.8 °F)  36.1 °C (97 °F) 36.2 °C (97.2 °F) 36.3 °C (97.3 °F)   Resp 16 14 14 16 16 16 14      There is no height or weight on file to calculate BMI.    General: Well-appearing, no acute distress    Skin intact bilateral upper and lower extremities  No erythema  No warmth    Detailed examination of the left shoulder demonstrates:  Surgical incision(s) healing well  No erythema or warmth  No drainage  No ecchymosis  Resolving swelling, minimal  Biceps contour normal  Shoulder range of motion deferred  Upper extremity motor grossly intact  C5-T1 sensation intact bilaterally  2+ radial pulses bilaterally  Warm and well-perfused, brisk capillary refill    IMAGING:   No new imaging      Sean Osorio MD  Attending Surgeon    Sports Medicine Orthopaedic Surgery  [facility] [employer]

## 2025-01-13 ENCOUNTER — APPOINTMENT (OUTPATIENT)
Dept: DERMATOLOGY | Facility: CLINIC | Age: 70
End: 2025-01-13
Payer: COMMERCIAL

## 2025-01-13 DIAGNOSIS — L57.0 ACTINIC KERATOSIS: ICD-10-CM

## 2025-01-13 DIAGNOSIS — D18.01 HEMANGIOMA OF SKIN: ICD-10-CM

## 2025-01-13 DIAGNOSIS — L82.0 INFLAMED SEBORRHEIC KERATOSIS: ICD-10-CM

## 2025-01-13 DIAGNOSIS — D22.5 MELANOCYTIC NEVUS OF TRUNK: ICD-10-CM

## 2025-01-13 DIAGNOSIS — L82.1 SEBORRHEIC KERATOSIS: ICD-10-CM

## 2025-01-13 DIAGNOSIS — L57.8 DIFFUSE PHOTODAMAGE OF SKIN: ICD-10-CM

## 2025-01-13 DIAGNOSIS — D48.5 NEOPLASM OF UNCERTAIN BEHAVIOR OF SKIN: Primary | ICD-10-CM

## 2025-01-13 DIAGNOSIS — L85.3 XEROSIS CUTIS: ICD-10-CM

## 2025-01-13 PROCEDURE — 1159F MED LIST DOCD IN RCRD: CPT | Performed by: DERMATOLOGY

## 2025-01-13 PROCEDURE — 11104 PUNCH BX SKIN SINGLE LESION: CPT | Performed by: DERMATOLOGY

## 2025-01-13 PROCEDURE — 11301 SHAVE SKIN LESION 0.6-1.0 CM: CPT | Performed by: DERMATOLOGY

## 2025-01-13 PROCEDURE — 11302 SHAVE SKIN LESION 1.1-2.0 CM: CPT | Performed by: DERMATOLOGY

## 2025-01-13 PROCEDURE — 17003 DESTRUCT PREMALG LES 2-14: CPT | Performed by: DERMATOLOGY

## 2025-01-13 PROCEDURE — 17110 DESTRUCTION B9 LES UP TO 14: CPT | Performed by: DERMATOLOGY

## 2025-01-13 PROCEDURE — 99204 OFFICE O/P NEW MOD 45 MIN: CPT | Performed by: DERMATOLOGY

## 2025-01-13 PROCEDURE — 1123F ACP DISCUSS/DSCN MKR DOCD: CPT | Performed by: DERMATOLOGY

## 2025-01-13 PROCEDURE — 17000 DESTRUCT PREMALG LESION: CPT | Performed by: DERMATOLOGY

## 2025-01-13 RX ORDER — AMMONIUM LACTATE 12 G/100G
CREAM TOPICAL
Qty: 385 G | Refills: 11 | Status: SHIPPED | OUTPATIENT
Start: 2025-01-13

## 2025-01-13 ASSESSMENT — DERMATOLOGY PATIENT ASSESSMENT
DO YOU USE A TANNING BED: NO
DO YOU HAVE ANY NEW OR CHANGING LESIONS: NO

## 2025-01-13 ASSESSMENT — DERMATOLOGY QUALITY OF LIFE (QOL) ASSESSMENT: ARE THERE EXCLUSIONS OR EXCEPTIONS FOR THE QUALITY OF LIFE ASSESSMENT: NO

## 2025-01-13 NOTE — PROGRESS NOTES
Nell Fiore Sr. is a 69 y.o. male who presents for the following: Skin Exam.  He notes a scaly on his right jawline, which has been present for several years, has increased in size, and does not heal and sometimes itches and hurts.  He also notes a scaly bump on his right lower leg, which has increased in size and does not heal.  He also reports a raised, rough bump on his left upper chest, which has been present for several months and has been itching recently, especially when it rubs on his clothing.  It has not changed in any other way, including in size, shape, or color, and it does not hurt or bleed.  Lastly, he notes very dry skin recently, especially on his legs.  He denies any other new, changing, or concerning skin lesions; no bleeding, itching, or burning lesions.      Review of Systems:  No other skin or systemic complaints other than what is documented elsewhere in the note.    The following portions of the chart were reviewed this encounter and updated as appropriate:       Skin Cancer History  No skin cancer on file.    Specialty Problems    None      Past Dermatologic / Past Relevant Medical History:    No history of atypical nevi or skin cancer    Family History:    Father - skin cancer of unknown type    Social History:    The patient states he lives in [place]    Allergies:  Patient has no known allergies.    Current Medications / CAM's:    Current Outpatient Medications:     ammonium lactate (Amlactin) 12 % cream, Apply once daily to both legs., Disp: 385 g, Rfl: 11    aspirin-acetaminophen-caffeine (Excedrin Migraine) 250-250-65 mg tablet, Take 1 tablet by mouth every 6 hours if needed for headaches., Disp: , Rfl:     cyanocobalamin (Vitamin B-12) 1,000 mcg tablet, Take 1 tablet (1,000 mcg) by mouth once daily., Disp: , Rfl:     fexofenadine-pseudoephedrine (Allegra-D)  mg 12 hr tablet, Take 1 tablet by mouth 2 times a day as needed for allergies. Do not crush,  chew, or split., Disp: , Rfl:     glucosamine-chondroitin 500-400 mg tablet, Take 1 tablet by mouth once daily., Disp: , Rfl:     multivitamin tablet, Take 1 tablet by mouth once daily., Disp: , Rfl:     omega 3-dha-epa-fish oil (Fish OiL) 1,000 mg (120 mg-180 mg) capsule, Take 1 capsule (1,000 mg) by mouth once daily., Disp: , Rfl:     red yeast rice 600 mg capsule, Take 600 mg by mouth once daily., Disp: , Rfl:     TURMERIC ORAL, Take 1 capsule by mouth once daily., Disp: , Rfl:      Objective   Well appearing patient in no apparent distress; mood and affect are within normal limits.    A full examination was performed including scalp, face, eyes, ears, nose, lips, neck, chest, axillae, abdomen, back, bilateral upper extremities, and bilateral lower extremities. All findings within normal limits unless otherwise noted below.    Assessment/Plan   1. Neoplasm of uncertain behavior of skin (3)  Left Proximal Dorsal Forearm  On the left proximal dorsal forearm there is a 1.2 cm brown, asymmetric patch with an asymmetric pigment network and irregular borders               Shave removal    Lesion length (cm):  1.2  Lesion width (cm):  1.2  Margin per side (cm):  0  Lesion diameter (cm):  1.2  Informed consent: discussed and consent obtained    Timeout: patient name, date of birth, surgical site, and procedure verified    Procedure prep:  Patient was prepped and draped  Anesthesia: the lesion was anesthetized in a standard fashion    Anesthetic:  1% lidocaine w/ epinephrine 1-100,000 local infiltration  Instrument used: flexible razor blade    Hemostasis achieved with: aluminum chloride    Outcome: patient tolerated procedure well    Post-procedure details: sterile dressing applied and wound care instructions given    Dressing type: bandage and petrolatum      Specimen 1 - Dermatopathology- DERM LAB  Differential Diagnosis: Lentigo vs SK vs AMH  Check Margins Yes/No?:    Comments:    Dermpath Lab: Routine Histopathology  (formalin-fixed tissue)    Right Mid Jawline  1 cm erythematous, scaly papule          Lesion biopsy  Type of biopsy: punch    Informed consent: discussed and consent obtained    Timeout: patient name, date of birth, surgical site, and procedure verified    Anesthesia: the lesion was anesthetized in a standard fashion    Anesthetic:  1% lidocaine w/ epinephrine 1-100,000 local infiltration  Punch size:  3 mm  Suture size:  5-0  Suture type: Prolene (polypropylene)    Suture removal (days):  7  Hemostasis achieved with: suture    Outcome: patient tolerated procedure well    Post-procedure details: sterile dressing applied and wound care instructions given    Dressing type: bandage and petrolatum      Specimen 2 - Dermatopathology- DERM LAB  Differential Diagnosis: AK vs SCCis  Check Margins Yes/No?:    Comments:    Dermpath Lab: Routine Histopathology (formalin-fixed tissue)    Right Lateral Distal Leg  7 mm pink, shiny, scaly papule               Shave removal    Lesion length (cm):  0.7  Lesion width (cm):  0.7  Margin per side (cm):  0  Lesion diameter (cm):  0.7  Informed consent: discussed and consent obtained    Timeout: patient name, date of birth, surgical site, and procedure verified    Procedure prep:  Patient was prepped and draped  Anesthesia: the lesion was anesthetized in a standard fashion    Anesthetic:  1% lidocaine w/ epinephrine 1-100,000 local infiltration  Instrument used: flexible razor blade    Hemostasis achieved with: aluminum chloride    Outcome: patient tolerated procedure well    Post-procedure details: sterile dressing applied and wound care instructions given    Dressing type: bandage and petrolatum      Specimen 3 - Dermatopathology- DERM LAB  Differential Diagnosis: ISK vs BCC  Check Margins Yes/No?:    Comments:    Dermpath Lab: Routine Histopathology (formalin-fixed tissue)    Related Procedures  Follow Up In Dermatology - Nurse Visit    2. Inflamed seborrheic keratosis  Left  Breast  On the patient's left medial upper chest, there is a 6 mm erythematous and light brown-colored, hyperkeratotic, stuck-on appearing papule with a surrounding rim of erythema    Inflamed Seborrheic Keratosis - left medial upper chest.  The benign nature of this lesion was discussed with the patient today and reassurance provided.  Given the history the patient provides of frequent irritation and associated symptoms as well as its inflamed appearance on exam today, we offered to treat this lesion with liquid nitrogen cryotherapy.  The patient expressed understanding, is in agreement with this plan, and wishes to proceed with cryotherapy today.    Destr of lesion - Left Breast  Complexity: simple    Destruction method: cryotherapy    Informed consent: discussed and consent obtained    Lesion destroyed using liquid nitrogen: Yes    Cryotherapy cycles:  2  Outcome: patient tolerated procedure well with no complications    Post-procedure details: wound care instructions given      3. Actinic keratosis (4)  Head - Anterior (Face) (4)  Scattered on the patient's left and right temples, there are 4 erythematous, gritty, scaly macules     Actinic Keratoses - scattered on bilateral temples.  The pre-cancerous nature of these lesions and treatment options were discussed with the patient today.  At this time, we recommend treatment with liquid nitrogen cryotherapy.  The patient expressed understanding, is in agreement with this plan, and wishes to proceed with cryotherapy today.    Destr of lesion - Head - Anterior (Face) (4)  Complexity: simple    Destruction method: cryotherapy    Informed consent: discussed and consent obtained    Lesion destroyed using liquid nitrogen: Yes    Cryotherapy cycles:  1  Outcome: patient tolerated procedure well with no complications    Post-procedure details: wound care instructions given      4. Melanocytic nevus of trunk  Scattered on the patient's face, neck, trunk, and extremities,  there are several small, round- to oval-shaped, brown-pigmented and pink-colored, symmetric, uniform-appearing macules and dome-shaped papules    Clinically benign- to slightly atypical-appearing nevi - the clinically benign- to slightly atypical-appearing nature of the patient's nevi was discussed with the patient today.  None of the patient's nevi meet threshold for biopsy today.  We emphasized the importance of performing monthly self-skin exams using the ABCDs of monitoring moles, which were reviewed with the patient today and an informational hand-out provided.  We also emphasized the importance of sun avoidance and sun protection with daily sunscreen use.  The patient expressed understanding and is in agreement with this plan.    5. Seborrheic keratosis (2)  Left Lower Back, Right Lower Back  Scattered on the patient's face, neck, trunk, and extremities, there are multiple tan- to light brown-colored, hyperkeratotic, stuck-on appearing papules of varying size and shape    Seborrheic Keratoses - the benign nature of these lesions was discussed with the patient today and reassurance provided.  No treatment is medically indicated for the noninflamed SKs at this time.    6. Hemangioma of skin  Right Thigh - Posterior  Scattered on the patient's face, neck, trunk, and extremities, there are multiple small, round, cherry red- to purplish-colored, symmetric, uniform, vascular-appearing macules and papules    Cherry Angiomas - the benign nature of these vascular lesions was discussed with the patient today and reassurance provided.  No treatment is medically indicated for these lesions at this time.    7. Xerosis cutis (2)  Left Lower Leg - Anterior, Right Lower Leg - Anterior  Diffuse generalized dry, scaly skin with mild retention hyperkeratosis, especially on his legs.    Xerosis.  We emphasized the importance of dry, sensitive skin care, including the use of a mild soap, such as Dove, and frequent and aggressive  moisturization, at least twice daily and immediately following showers or baths, with recommended over-the-counter moisturizing creams, such as Eucerin, Cetaphil, Cerave, or Aveeno, or Vaseline or Aquaphor ointments.  The patient was also provided an informational hand-out today containing further details on dry skin care.  In addition, we recommend topical keratolytic therapy and moisturization with Ammonium Lactate 12% lotion, which the patient was instructed to apply twice daily to the affected areas for moisturization.  The risks, benefits, and side effects of this medication were discussed.  The patient expressed understanding and is in agreement with this plan.    Related Medications  ammonium lactate (Amlactin) 12 % cream  Apply once daily to both legs.    8. Diffuse photodamage of skin  Diffuse photodamage with actinic changes with telangiectasia and mottled pigmentation in sun-exposed areas.    Photodamage.  The signs and symptoms of skin cancer were reviewed and the patient was advised to practice sun protection and sun avoidance, use daily sunscreen, and perform regular self skin exams.  Sun protection was discussed, including avoiding the mid-day sun, wearing a sunscreen with SPF at least 50, and stressing the need for reapplication of sunscreen and applying more than they think they need.    Related Procedures  Follow Up In Dermatology - Established Patient        Willie Wright MD  PGY-3, Department of Dermatology       I saw and evaluated the patient. I personally obtained the key and critical portions of the history and physical exam or was physically present for key and critical portions performed by the resident/fellow. I reviewed the resident/fellow's documentation and discussed the patient with the resident/fellow. I agree with the resident/fellow's medical decision making as documented in the note.    Charly Rodriguez MD

## 2025-01-20 ENCOUNTER — CLINICAL SUPPORT (OUTPATIENT)
Dept: DERMATOLOGY | Facility: CLINIC | Age: 70
End: 2025-01-20
Payer: COMMERCIAL

## 2025-01-20 ENCOUNTER — APPOINTMENT (OUTPATIENT)
Dept: DERMATOLOGY | Facility: CLINIC | Age: 70
End: 2025-01-20
Payer: COMMERCIAL

## 2025-01-20 DIAGNOSIS — Z48.02 VISIT FOR SUTURE REMOVAL: ICD-10-CM

## 2025-01-20 DIAGNOSIS — D48.5 NEOPLASM OF UNCERTAIN BEHAVIOR OF SKIN: ICD-10-CM

## 2025-01-20 PROCEDURE — 99024 POSTOP FOLLOW-UP VISIT: CPT | Performed by: DERMATOLOGY

## 2025-01-20 NOTE — PROGRESS NOTES
Nell Fiore Sr. is a 69 y.o. male who presents for the following: Nurse Visit (Pt here for suture removal of right mid jawline. ).     Review of Systems:  No other skin or systemic complaints other than what is documented elsewhere in the note.    The following portions of the chart were reviewed this encounter and updated as appropriate:          Skin Cancer History  No skin cancer on file.      Specialty Problems    None       Objective   Well appearing patient in no apparent distress; mood and affect are within normal limits.    A focused skin examination was performed. All findings within normal limits unless otherwise noted below.    Assessment/Plan   1. Neoplasm of uncertain behavior of skin    Related Procedures  Follow Up In Dermatology - Nurse Visit    2. Visit for suture removal  Right Buccal Cheek    Suture removal- right mid jawline, site healed well, 1 suture removed, so signs of infection.l

## 2025-01-22 LAB
LAB AP ASR DISCLAIMER: NORMAL
LABORATORY COMMENT REPORT: NORMAL
PATH REPORT.FINAL DX SPEC: NORMAL
PATH REPORT.GROSS SPEC: NORMAL
PATH REPORT.MICROSCOPIC SPEC OTHER STN: NORMAL
PATH REPORT.RELEVANT HX SPEC: NORMAL
PATH REPORT.TOTAL CANCER: NORMAL

## 2025-01-27 ENCOUNTER — APPOINTMENT (OUTPATIENT)
Dept: ORTHOPEDIC SURGERY | Facility: CLINIC | Age: 70
End: 2025-01-27
Payer: COMMERCIAL

## 2025-01-27 VITALS — WEIGHT: 200 LBS | BODY MASS INDEX: 29.62 KG/M2 | HEIGHT: 69 IN

## 2025-01-27 DIAGNOSIS — M75.102 TEAR OF LEFT ROTATOR CUFF, UNSPECIFIED TEAR EXTENT, UNSPECIFIED WHETHER TRAUMATIC: ICD-10-CM

## 2025-01-27 PROCEDURE — 1159F MED LIST DOCD IN RCRD: CPT

## 2025-01-27 PROCEDURE — 1123F ACP DISCUSS/DSCN MKR DOCD: CPT

## 2025-01-27 PROCEDURE — 1036F TOBACCO NON-USER: CPT

## 2025-01-27 PROCEDURE — 99024 POSTOP FOLLOW-UP VISIT: CPT

## 2025-01-27 PROCEDURE — 3008F BODY MASS INDEX DOCD: CPT

## 2025-01-27 PROCEDURE — 1160F RVW MEDS BY RX/DR IN RCRD: CPT

## 2025-01-27 NOTE — PROGRESS NOTES
PRIMARY CARE PHYSICIAN: Ernie Chaidez MD    ORTHOPAEDIC POSTOPERATIVE VISIT    ASSESSMENT & PLAN    Impression: 69 y.o. male 6 weeks postop s/p Left shoulder arthroscopy, rotator cuff repair augmented with Regeneten, subacromial decompression and acromioplasty, intra-articular debridement and synovectomy and open subpectoral biceps tenodesis done 12/17/2024.    Plan:   Overall Bony is doing well. He discontinued his sling a few weeks ago despite his instructions to wear his sling at all times for the first 6 weeks. He also had a fall onto his left shoulder and has been actively using the left arm for work. I discussed with the patient the importance of following his post-operative precautions in order for his repair to heal properly. He expressed understanding.  He will begin working with physical therapy through the post-operative protocol for the above. I again reiterated his postoperative precautions and he expressed understanding.  Patient will ice and rest the shoulder as he needs.  He will follow-up with us in 6 weeks.    I reviewed their postoperative timeline and plan with them. They understand the postoperative precautions and the treatment plan going forward.     Follow-Up: Patient will follow-up in 6 weeks, no x-rays    At the end of the visit, all questions were answered in full. The patient is in agreement with the plan and recommendations. They will call the office with any questions/concerns.      Note dictated with [employer] software. Completed without full typed error editing and sent to avoid delay.      SUBJECTIVE  CHIEF COMPLAINT: Post-op       HPI: Bony Fiore Sr. is a 69 y.o. patient who is now 6 weeks postop s/p Left shoulder arthroscopy, rotator cuff repair, subacromial decompression and acromioplasty, intra-articular debridement and synovectomy and open subpectoral biceps tenodesis done 12/17/2024. Overall Bony is doing well. He states that he currently has  no pain or discomfort but does with certain activities. He discontinued with his sling a few weeks ago despite his instructions to wear it for 6 weeks after surgery. He also reports a recent fall onto his left shoulder from slipping on ice. His pain has returned to baseline from the fall. 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       REVIEW OF SYSTEMS  Constitutional: See HPI for pain assessment, No significant weight loss, recent trauma  Cardiovascular: No chest pain, shortness of breath  Respiratory: No difficulty breathing, cough  Gastrointestinal: No nausea, vomiting, diarrhea, constipation  Musculoskeletal: Noted in HPI, positive for pain, restricted motion, stiffness  Integumentary: No rashes, easy bruising, redness   Neurological: no numbness or tingling in extremities, no gait disturbances   Psychiatric: No mood changes, memory changes, social issues  Heme/Lymph: no excessive swelling, easy bruising, excessive bleeding  ENT: No hearing changes  Eyes: No vision changes    CURRENT MEDICATIONS:   Current Outpatient Medications   Medication Sig Dispense Refill    ammonium lactate (Amlactin) 12 % cream Apply once daily to both legs. 385 g 11    aspirin-acetaminophen-caffeine (Excedrin Migraine) 250-250-65 mg tablet Take 1 tablet by mouth every 6 hours if needed for headaches.      cyanocobalamin (Vitamin B-12) 1,000 mcg tablet Take 1 tablet (1,000 mcg) by mouth once daily.      fexofenadine-pseudoephedrine (Allegra-D)  mg 12 hr tablet Take 1 tablet by mouth 2 times a day as needed for allergies. Do not crush, chew, or split.      glucosamine-chondroitin 500-400 mg tablet Take 1 tablet by mouth once daily.      multivitamin tablet Take 1 tablet by mouth once daily.      omega 3-dha-epa-fish oil (Fish OiL) 1,000 mg (120 mg-180 mg) capsule Take 1 capsule (1,000 mg) by mouth once daily.      red yeast rice 600 mg capsule Take 600 mg by  "mouth once daily.      TURMERIC ORAL Take 1 capsule by mouth once daily.       No current facility-administered medications for this visit.        OBJECTIVE    PHYSICAL EXAM      12/17/2024    11:57 AM 12/17/2024     2:24 PM 12/17/2024     2:40 PM 12/17/2024     2:55 PM 12/17/2024     3:10 PM 12/17/2024     3:26 PM 12/30/2024     8:25 AM   Vitals   Systolic 107 110 113 122 128 126    Diastolic 77 78 84 76 86 85    Heart Rate 68 68 80 78 71 81    Temp  36 °C (96.8 °F)  36.1 °C (97 °F) 36.2 °C (97.2 °F) 36.3 °C (97.3 °F)    Resp 14 14 16 16 16 14    Height       1.753 m (5' 9\")   Weight (lb)       202   BMI       29.83 kg/m2   BSA (m2)       2.11 m2   Visit Report       Report      There is no height or weight on file to calculate BMI.    General: Well-appearing, no acute distress    Skin intact bilateral upper and lower extremities  No erythema  No warmth    Detailed examination of the left shoulder demonstrates:  Surgical incision(s) well healed  No erythema or warmth  No ecchymosis  Resolving swelling, minimal  Biceps contour normal  Shoulder range of motion: PROM forward flexion 120, ER 45  Upper extremity motor grossly intact  C5-T1 sensation intact bilaterally  2+ radial pulses bilaterally  Warm and well-perfused, brisk capillary refill    IMAGING:   No new imaging  "

## 2025-02-11 ENCOUNTER — EVALUATION (OUTPATIENT)
Dept: PHYSICAL THERAPY | Facility: CLINIC | Age: 70
End: 2025-02-11
Payer: COMMERCIAL

## 2025-02-11 DIAGNOSIS — M62.81 MUSCLE WEAKNESS OF RIGHT UPPER EXTREMITY: ICD-10-CM

## 2025-02-11 DIAGNOSIS — M25.611 DECREASED ROM OF RIGHT SHOULDER: ICD-10-CM

## 2025-02-11 DIAGNOSIS — M75.102 TEAR OF LEFT ROTATOR CUFF, UNSPECIFIED TEAR EXTENT, UNSPECIFIED WHETHER TRAUMATIC: Primary | ICD-10-CM

## 2025-02-11 PROCEDURE — 97161 PT EVAL LOW COMPLEX 20 MIN: CPT | Mod: GP

## 2025-02-11 ASSESSMENT — ENCOUNTER SYMPTOMS
LOSS OF SENSATION IN FEET: 0
OCCASIONAL FEELINGS OF UNSTEADINESS: 0
DEPRESSION: 0

## 2025-02-11 ASSESSMENT — PAIN SCALES - GENERAL: PAINLEVEL_OUTOF10: 0 - NO PAIN

## 2025-02-11 ASSESSMENT — PAIN - FUNCTIONAL ASSESSMENT: PAIN_FUNCTIONAL_ASSESSMENT: 0-10

## 2025-02-11 NOTE — PROGRESS NOTES
Physical Therapy    Physical Therapy Evaluation and Treatment      Patient Name: Bony Fiore Sr.  MRN: [id number]  Today's Date: 2025  : 1955    Time Entry:   Time Calculation  Start Time: 730  Stop Time: 805  Time Calculation (min): 35 min  PT Evaluation Time Entry  PT Evaluation (Low) Time Entry: 25     Visit # 1                     Non-Billable Time  Non-billable time: 10  Non-billable time reason: 10' HP L shoulder    Assessment:  PT Assessment  PT Assessment Results: Decreased range of motion, Decreased mobility, Orthopedic restrictions  Rehab Prognosis: Excellent   Patient is a 69 year old male p/o L RC repair with Regeneten, subacromial decompression, intra-articular debridement/synovectomy and open subpectoral biceps tenodesis on 24.  8 weeks p/o today.  See attached protocol.  He presents to PT with normal ROM deficits following surgery.  Discussed his post op protocol in length.  He will benefit from skilled intervention to increase ROM/strength of left shoulder for return to previous activity level.     Plan:  OP PT Plan  Treatment/Interventions: Cryotherapy, Education/ Instruction, Hot pack, Manual therapy, Therapeutic exercises  PT Plan: Skilled PT  PT Frequency: 1 time per week  Number of Treatments Authorized: 30 V per year; $25 co-pay  Rehab Potential: Excellent  Plan of Care Agreement: Patient    Current Problem:   1. Tear of left rotator cuff, unspecified tear extent, unspecified whether traumatic  Referral to Physical Therapy    Follow Up In Physical Therapy      2. Decreased ROM of right shoulder        3. Muscle weakness of right upper extremity        Name &  confirmed by patient.    Subjective    General:  General  Reason for Referral: L RC tear  Referred By: LoPresti PA-C  Patient underwent L RC repair with Regeneten, subacromial decompression, intra-articular debridement/synovectomy and open subpectoral biceps tenodesis on 24.  Patient denies pain.  Follow  up with Dr. Osorio 3/17/25.    Precautions:  Precautions  MONICAADI Fall Risk Score (The score of 4 or more indicates an increased risk of falling): 0  Precautions Comment: Per post op protocol     Pain:  Pain Assessment  Pain Assessment: 0-10  0-10 (Numeric) Pain Score: 0 - No pain     Prior Level of Function:  Prior Function Per Pt/Caregiver Report  Level of [place]: Independent with ADLs and functional transfers  Hand Dominance: Right    Objective   PROM L shoulder  Flexion = 140  Abduction = 80  ER = 60    Outcome Measures:  Other Measures  Disability of Arm Shoulder Hand (DASH): 18     Treatments:  HP L shoulder - 10'    EDUCATION:   Reviewed post op protocol/precautions    Goals: (By week 20 post op)  1) Increase L shoulder AROM to WNL to ease capability to perform overhead/reaching activities    2) Increase L shoulder strength to >4/5 to ease capability to lift household objects and perform work tasks

## 2025-02-18 ENCOUNTER — TREATMENT (OUTPATIENT)
Dept: PHYSICAL THERAPY | Facility: CLINIC | Age: 70
End: 2025-02-18
Payer: MEDICARE

## 2025-02-18 DIAGNOSIS — M75.102 TEAR OF LEFT ROTATOR CUFF, UNSPECIFIED TEAR EXTENT, UNSPECIFIED WHETHER TRAUMATIC: ICD-10-CM

## 2025-02-18 DIAGNOSIS — M62.81 MUSCLE WEAKNESS OF RIGHT UPPER EXTREMITY: ICD-10-CM

## 2025-02-18 DIAGNOSIS — M25.611 DECREASED ROM OF RIGHT SHOULDER: ICD-10-CM

## 2025-02-18 PROCEDURE — 97110 THERAPEUTIC EXERCISES: CPT | Mod: GP,CQ

## 2025-02-18 PROCEDURE — 97140 MANUAL THERAPY 1/> REGIONS: CPT | Mod: GP,CQ

## 2025-02-18 ASSESSMENT — PAIN SCALES - GENERAL: PAINLEVEL_OUTOF10: 0 - NO PAIN

## 2025-02-18 ASSESSMENT — PAIN - FUNCTIONAL ASSESSMENT: PAIN_FUNCTIONAL_ASSESSMENT: 0-10

## 2025-02-18 NOTE — PROGRESS NOTES
"Physical Therapy    Physical Therapy Treatment    Patient Name: Bony Fiore Sr.  MRN: [id number]  Today's Date: 2/18/2025    Time Entry:   Time Calculation  Start Time: 0800  Stop Time: 0843  Time Calculation (min): 43 min     PT Therapeutic Procedures Time Entry  Manual Therapy Time Entry: 15  Therapeutic Exercise Time Entry: 13  PT Modalities Time Entry  Hot/Cold Pack Time Entry: 5           Non-Billable Time  Non-billable time: 5  Non-billable time reason: HP prior ro Tx    Assessment:   Discussed with the pt the importance of following post op precautions to not re injure his shoulder.   Issued and reviewed a HEP. Pt demonstrated good technique with these exercises.   Left shoulder PROM is improving nicely.       Plan:   Progress per protocol. Pt will be on vacation for the next 2 weeks.    Current Problem  1. Decreased ROM of right shoulder        2. Muscle weakness of right upper extremity        3. Tear of left rotator cuff, unspecified tear extent, unspecified whether traumatic  Follow Up In Physical Therapy              Subjective    Pt reports that he shoveled his deck yesterday. His shoulder was sore afterwards. No pain today.  Pt has also returned to work as a .      Precautions   Precautions  STEADI Fall Risk Score (The score of 4 or more indicates an increased risk of falling): 0  Precautions Comment: Per post op protocol        Pain  Pain Assessment  Pain Assessment: 0-10  0-10 (Numeric) Pain Score: 0 - No pain  Pain Type: Surgical pain  Pain Location: Shoulder  Pain Orientation: Left    Objective   PROM L shoulder  Flexion = 140  ER = 65    Treatments:  EXERCISE Date 2/18/25 Date Date Date    REPS REPS REPS REPS   Scapular retraction 30x5\"H      Retro shoulder rolls 30x5\"H             Pulleys      Flexion       Pulleys      Scaption/Abduction       Pulleys      IR              Seated shoulder flexion towel slide 15x10\"H      Seated shoulder scaption towel slide 15x10\"H                  "                 Drew              Left shoulder PROM 15 min                           Issued and reviewed HEP 5 min                                                                          OP EDUCATION:   HEP   Seated shoulder scaption slide at table top - 10 reps - 10 second hold - 2X daily   Seated shoulder flexion slide at table top - 10 reps - 10 second hold - 2X daily   Retro shoulder rolls in sitting - 20 reps - 5 second hold - 2X daily   Seated scapular retraction - 20 reps - 5 second hold - 2X daily      Goals:  1) Increase L shoulder AROM to WNL to ease capability to perform overhead/reaching activities    2) Increase L shoulder strength to >4/5 to ease capability to lift household objects and perform work tasks

## 2025-03-04 ENCOUNTER — APPOINTMENT (OUTPATIENT)
Dept: PHYSICAL THERAPY | Facility: CLINIC | Age: 70
End: 2025-03-04
Payer: COMMERCIAL

## 2025-03-04 DIAGNOSIS — M25.611 DECREASED ROM OF RIGHT SHOULDER: ICD-10-CM

## 2025-03-04 DIAGNOSIS — M62.81 MUSCLE WEAKNESS OF RIGHT UPPER EXTREMITY: ICD-10-CM

## 2025-03-07 ENCOUNTER — DOCUMENTATION (OUTPATIENT)
Dept: PHYSICAL THERAPY | Facility: CLINIC | Age: 70
End: 2025-03-07
Payer: COMMERCIAL

## 2025-03-07 ENCOUNTER — APPOINTMENT (OUTPATIENT)
Dept: PHYSICAL THERAPY | Facility: CLINIC | Age: 70
End: 2025-03-07
Payer: MEDICARE

## 2025-03-07 DIAGNOSIS — M25.611 DECREASED ROM OF RIGHT SHOULDER: ICD-10-CM

## 2025-03-07 DIAGNOSIS — M62.81 MUSCLE WEAKNESS OF RIGHT UPPER EXTREMITY: ICD-10-CM

## 2025-03-07 NOTE — PROGRESS NOTES
Physical Therapy                 Therapy Communication Note    Patient Name: Bony Fiore   MRN: [id number]  Department:   Room: Room/bed info not found  Today's Date: 3/7/2025     Discipline: Physical Therapy           Missed Time: Cancel

## 2025-03-17 ENCOUNTER — APPOINTMENT (OUTPATIENT)
Dept: ORTHOPEDIC SURGERY | Facility: CLINIC | Age: 70
End: 2025-03-17
Payer: COMMERCIAL

## 2025-03-17 VITALS — BODY MASS INDEX: 29.62 KG/M2 | WEIGHT: 200 LBS | HEIGHT: 69 IN

## 2025-03-17 DIAGNOSIS — M75.102 TEAR OF LEFT ROTATOR CUFF, UNSPECIFIED TEAR EXTENT, UNSPECIFIED WHETHER TRAUMATIC: Primary | ICD-10-CM

## 2025-03-17 PROCEDURE — 3008F BODY MASS INDEX DOCD: CPT | Performed by: STUDENT IN AN ORGANIZED HEALTH CARE EDUCATION/TRAINING PROGRAM

## 2025-03-17 PROCEDURE — 99024 POSTOP FOLLOW-UP VISIT: CPT | Performed by: STUDENT IN AN ORGANIZED HEALTH CARE EDUCATION/TRAINING PROGRAM

## 2025-03-17 PROCEDURE — 1160F RVW MEDS BY RX/DR IN RCRD: CPT | Performed by: STUDENT IN AN ORGANIZED HEALTH CARE EDUCATION/TRAINING PROGRAM

## 2025-03-17 PROCEDURE — 1123F ACP DISCUSS/DSCN MKR DOCD: CPT | Performed by: STUDENT IN AN ORGANIZED HEALTH CARE EDUCATION/TRAINING PROGRAM

## 2025-03-17 PROCEDURE — 1036F TOBACCO NON-USER: CPT | Performed by: STUDENT IN AN ORGANIZED HEALTH CARE EDUCATION/TRAINING PROGRAM

## 2025-03-17 PROCEDURE — 1159F MED LIST DOCD IN RCRD: CPT | Performed by: STUDENT IN AN ORGANIZED HEALTH CARE EDUCATION/TRAINING PROGRAM

## 2025-03-19 ENCOUNTER — APPOINTMENT (OUTPATIENT)
Dept: DERMATOLOGY | Facility: CLINIC | Age: 70
End: 2025-03-19
Payer: COMMERCIAL

## 2025-04-02 ENCOUNTER — APPOINTMENT (OUTPATIENT)
Dept: DERMATOLOGY | Facility: CLINIC | Age: 70
End: 2025-04-02
Payer: COMMERCIAL

## 2025-04-02 DIAGNOSIS — Z87.2 HISTORY OF ACTINIC KERATOSES: ICD-10-CM

## 2025-04-02 DIAGNOSIS — L72.0 MILIA: ICD-10-CM

## 2025-04-02 DIAGNOSIS — L57.0 ACTINIC KERATOSIS: Primary | ICD-10-CM

## 2025-04-02 DIAGNOSIS — L57.8 DIFFUSE PHOTODAMAGE OF SKIN: ICD-10-CM

## 2025-04-02 DIAGNOSIS — L81.4 LENTIGO: ICD-10-CM

## 2025-04-02 DIAGNOSIS — L85.3 XEROSIS CUTIS: ICD-10-CM

## 2025-04-02 PROCEDURE — 99213 OFFICE O/P EST LOW 20 MIN: CPT | Performed by: DERMATOLOGY

## 2025-04-02 PROCEDURE — 17000 DESTRUCT PREMALG LESION: CPT | Performed by: DERMATOLOGY

## 2025-04-02 PROCEDURE — 1123F ACP DISCUSS/DSCN MKR DOCD: CPT | Performed by: DERMATOLOGY

## 2025-04-02 PROCEDURE — 1159F MED LIST DOCD IN RCRD: CPT | Performed by: DERMATOLOGY

## 2025-04-03 NOTE — PROGRESS NOTES
Nell Fiore Sr. is a 69 y.o. male who presents for the following: Discuss recent biopsy result and treatment options.  Punch biopsy of a suspicious lesion on his right mid jawline performed at his last visit in our office on 1/13/25 revealed an actinic keratosis.  Of note, biopsy of 2 additional suspicious lesions also performed at that visit revealed a lentigo on his left proximal dorsal forearm and a dermatofibroma on his right lateral distal leg.    Today, the patient states the biopsy sites healed well.  Since his last visit, he reports he has noticed a few small black and white bumps next to his eyes.  They have not changed in any way, including in size, shape, or color, and they sometimes itch, but they do not hurt or bleed, and they do not go away.  He also notes very dry skin all over recently.  He denies any other new, changing, or concerning skin lesions since his last visit; no bleeding, itching, or burning lesions.      Review of Systems:  No other skin or systemic complaints other than what is documented elsewhere in the note.    The following portions of the chart were reviewed this encounter and updated as appropriate:       Skin Cancer History  No skin cancer on file.    Specialty Problems    None      Past Dermatologic / Past Relevant Medical History:    - history of AKs  - no history of atypical nevi or skin cancer    Family History:    Father - skin cancer of unknown type    Social History:    The patient states he lives in [place] and works as a     Allergies:  Patient has no known allergies.    Current Medications / CAM's:    Current Outpatient Medications:     ammonium lactate (Amlactin) 12 % cream, Apply once daily to both legs., Disp: 385 g, Rfl: 11    aspirin-acetaminophen-caffeine (Excedrin Migraine) 250-250-65 mg tablet, Take 1 tablet by mouth every 6 hours if needed for headaches., Disp: , Rfl:     cyanocobalamin (Vitamin B-12) 1,000 mcg tablet, Take 1  tablet (1,000 mcg) by mouth once daily., Disp: , Rfl:     fexofenadine-pseudoephedrine (Allegra-D)  mg 12 hr tablet, Take 1 tablet by mouth 2 times a day as needed for allergies. Do not crush, chew, or split., Disp: , Rfl:     glucosamine-chondroitin 500-400 mg tablet, Take 1 tablet by mouth once daily., Disp: , Rfl:     multivitamin tablet, Take 1 tablet by mouth once daily., Disp: , Rfl:     omega 3-dha-epa-fish oil (Fish OiL) 1,000 mg (120 mg-180 mg) capsule, Take 1 capsule (1,000 mg) by mouth once daily., Disp: , Rfl:     red yeast rice 600 mg capsule, Take 600 mg by mouth once daily., Disp: , Rfl:     TURMERIC ORAL, Take 1 capsule by mouth once daily., Disp: , Rfl:      Objective   Well appearing patient in no apparent distress; mood and affect are within normal limits.    A skin examination was performed including the face and neck. All findings within normal limits unless otherwise noted below.    Assessment/Plan   1. Actinic keratosis  Head - Anterior (Face)  On the patient's right mid jawline, there is a pink, well-healed scar at the recent biopsy site with a surrounding rim of erythema, grittiness, and scale    Biopsy-proven Actinic Keratosis -right mid-jawline, present on the peripheral margins.  The pre-cancerous nature of this lesion, its presence on the margin(s), and treatment options were discussed with the patient today.  At this time, I recommend treatment with liquid nitrogen cryotherapy.  The patient expressed understanding, is in agreement with this plan, and wishes to proceed with cryotherapy today.    Destr of lesion - Head - Anterior (Face)  Complexity: simple    Destruction method: cryotherapy    Informed consent: discussed and consent obtained    Lesion destroyed using liquid nitrogen: Yes    Region frozen until ice ball extended beyond lesion: Yes    Cryotherapy cycles:  1  Outcome: patient tolerated procedure well with no complications    Post-procedure details: wound care  instructions given      2. Milia  Head - Anterior (Face)  On his left and right lateral canthi, there are several open comedones and several small, 1 to 2 mm, round, whitish-colored, cystic-appearing papules    Milia and open comedones - left and right lateral canthi.  The benign nature of these cystic lesions was discussed with the patient today and reassurance provided.  No treatment is medically indicated for these lesions at this time.  The patient expressed understanding and a sense of reassurance and is in agreement with this plan.    3. Lentigo  Photodistributed  Multiple tan- to light brown-colored, round- to oval-shaped, symmetric and uniform-appearing macules and small patches consistent with lentigines scattered in sun-exposed areas.    Solar Lentigines and photodamage.  The clinically benign-appearing nature of these lesions and their relation to chronic sun exposure were discussed with the patient today and reassurance provided.  None of these lesions meet threshold for biopsy today, and thus no treatment is medically indicated for these lesions at this time.  The signs and symptoms of skin cancer were reviewed and the patient was advised to practice sun protection and sun avoidance, use daily sunscreen, and perform regular self skin exams.  The patient was instructed to monitor these lesions for any changes, such as in size, shape, or color, or associated symptoms and to call our office to schedule a return visit for re-evaluation if any such changes or symptoms are noticed in the future.  The patient expressed understanding and is in agreement with this plan.    4. Xerosis cutis  Diffuse generalized dry, scaly skin.    Xerosis.  I emphasized the importance of dry, sensitive skin care, including the use of a mild soap, such as Dove, and frequent and aggressive moisturization, at least twice daily and immediately following showers or baths, with recommended over-the-counter moisturizing creams, such as  Eucerin, Cetaphil, Cerave, or Aveeno, or Vaseline or Aquaphor ointments.  The patient was also provided an informational hand-out today containing further details on dry skin care.    Related Medications  ammonium lactate (Amlactin) 12 % cream  Apply once daily to both legs.    5. History of actinic keratoses  There is evidence of photodamage in sun-exposed areas.    History of actinic keratoses and photodamage.  The signs and symptoms of skin cancer were reviewed and the patient was advised to practice sun protection and sun avoidance, use daily sunscreen, and perform regular self skin exams.  I will have the patient return to our office in 1 year for routine follow-up and skin exam, and the patient was instructed to call our office should the patient notice any new, changing, symptomatic, or otherwise concerning skin lesions before then.  The patient expressed understanding and is in agreement with this plan.    6. Diffuse photodamage of skin  Diffuse photodamage with actinic changes with telangiectasia and mottled pigmentation in sun-exposed areas.    Photodamage.  The signs and symptoms of skin cancer were reviewed and the patient was advised to practice sun protection and sun avoidance, use daily sunscreen, and perform regular self skin exams.  Sun protection was discussed, including avoiding the mid-day sun, wearing a sunscreen with SPF at least 50, and stressing the need for reapplication of sunscreen and applying more than they think they need.

## 2025-04-16 ENCOUNTER — APPOINTMENT (OUTPATIENT)
Dept: ORTHOPEDIC SURGERY | Age: 70
End: 2025-04-16
Payer: COMMERCIAL

## 2025-04-16 VITALS — BODY MASS INDEX: 29.62 KG/M2 | WEIGHT: 200 LBS | HEIGHT: 69 IN

## 2025-04-16 DIAGNOSIS — S46.011A TRAUMATIC TEAR OF RIGHT ROTATOR CUFF, UNSPECIFIED TEAR EXTENT, INITIAL ENCOUNTER: Primary | ICD-10-CM

## 2025-04-16 PROCEDURE — 1159F MED LIST DOCD IN RCRD: CPT | Performed by: STUDENT IN AN ORGANIZED HEALTH CARE EDUCATION/TRAINING PROGRAM

## 2025-04-16 PROCEDURE — 1160F RVW MEDS BY RX/DR IN RCRD: CPT | Performed by: STUDENT IN AN ORGANIZED HEALTH CARE EDUCATION/TRAINING PROGRAM

## 2025-04-16 PROCEDURE — 99214 OFFICE O/P EST MOD 30 MIN: CPT | Performed by: STUDENT IN AN ORGANIZED HEALTH CARE EDUCATION/TRAINING PROGRAM

## 2025-04-16 PROCEDURE — 1036F TOBACCO NON-USER: CPT | Performed by: STUDENT IN AN ORGANIZED HEALTH CARE EDUCATION/TRAINING PROGRAM

## 2025-04-16 PROCEDURE — 1123F ACP DISCUSS/DSCN MKR DOCD: CPT | Performed by: STUDENT IN AN ORGANIZED HEALTH CARE EDUCATION/TRAINING PROGRAM

## 2025-04-16 PROCEDURE — 3008F BODY MASS INDEX DOCD: CPT | Performed by: STUDENT IN AN ORGANIZED HEALTH CARE EDUCATION/TRAINING PROGRAM

## 2025-04-16 NOTE — PROGRESS NOTES
PRIMARY CARE PHYSICIAN: Ernie Chaidez MD    ORTHOPAEDIC POSTOPERATIVE VISIT    ASSESSMENT & PLAN    Impression: 69 y.o. male 3 months postop s/p Left shoulder arthroscopy, rotator cuff repair augmented with Regeneten, subacromial decompression and acromioplasty, intra-articular debridement and synovectomy and open subpectoral biceps tenodesis done 12/17/2024.    Plan:   Overall Bony is doing well. He we will continue working with physical therapy through the post-operative protocol for the above focusing on his dynamic strength and control in the shoulder. I again reiterated his postoperative precautions and he expressed understanding.  Patient will ice and rest the shoulder as he needs.  He will follow-up with us as needed.    I reviewed their postoperative timeline and plan with them. They understand the postoperative precautions and the treatment plan going forward.     Follow-Up: Patient will follow-up as needed    At the end of the visit, all questions were answered in full. The patient is in agreement with the plan and recommendations. They will call the office with any questions/concerns.      Note dictated with Widget[employer] software. Completed without full typed error editing and sent to avoid delay.      SUBJECTIVE  CHIEF COMPLAINT: established patient new injury to right shoulder. Hurt shoulder on the 3/22/25 lifting a cooler.     HPI: Bony Fiore Sr. is a 69 y.o. patient who is now 3 months  postop s/p Left shoulder arthroscopy, rotator cuff repair, subacromial decompression and acromioplasty, intra-articular debridement and synovectomy and open subpectoral biceps tenodesis done 12/17/2024. Patient doing well no concerns.  He is happy with his outcome thus far and has no complaints.  He is continuing to work on his strength.    See below for previous HPI:1/27/25  Overall Bony is doing well. He states that he currently has no pain or discomfort but does with certain  activities. He discontinued with his sling a few weeks ago despite his instructions to wear it for 6 weeks after surgery. He also reports a recent fall onto his left shoulder from slipping on ice. His pain has returned to baseline from the fall. 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       REVIEW OF SYSTEMS  Constitutional: See HPI for pain assessment, No significant weight loss, recent trauma  Cardiovascular: No chest pain, shortness of breath  Respiratory: No difficulty breathing, cough  Gastrointestinal: No nausea, vomiting, diarrhea, constipation  Musculoskeletal: Noted in HPI, positive for pain, restricted motion, stiffness  Integumentary: No rashes, easy bruising, redness   Neurological: no numbness or tingling in extremities, no gait disturbances   Psychiatric: No mood changes, memory changes, social issues  Heme/Lymph: no excessive swelling, easy bruising, excessive bleeding  ENT: No hearing changes  Eyes: No vision changes    CURRENT MEDICATIONS:   Current Outpatient Medications   Medication Sig Dispense Refill    ammonium lactate (Amlactin) 12 % cream Apply once daily to both legs. 385 g 11    aspirin-acetaminophen-caffeine (Excedrin Migraine) 250-250-65 mg tablet Take 1 tablet by mouth every 6 hours if needed for headaches.      cyanocobalamin (Vitamin B-12) 1,000 mcg tablet Take 1 tablet (1,000 mcg) by mouth once daily.      fexofenadine-pseudoephedrine (Allegra-D)  mg 12 hr tablet Take 1 tablet by mouth 2 times a day as needed for allergies. Do not crush, chew, or split.      glucosamine-chondroitin 500-400 mg tablet Take 1 tablet by mouth once daily.      multivitamin tablet Take 1 tablet by mouth once daily.      omega 3-dha-epa-fish oil (Fish OiL) 1,000 mg (120 mg-180 mg) capsule Take 1 capsule (1,000 mg) by mouth once daily.      red yeast rice 600 mg capsule Take 600 mg by mouth once daily.      TURMERIC ORAL Take 1  "capsule by mouth once daily.       No current facility-administered medications for this visit.        OBJECTIVE    PHYSICAL EXAM      12/17/2024     2:24 PM 12/17/2024     2:40 PM 12/17/2024     2:55 PM 12/17/2024     3:10 PM 12/17/2024     3:26 PM 12/30/2024     8:25 AM 1/27/2025     8:43 AM   Vitals   Systolic 110 113 122 128 126     Diastolic 78 84 76 86 85     Heart Rate 68 80 78 71 81     Temp 36 °C (96.8 °F)  36.1 °C (97 °F) 36.2 °C (97.2 °F) 36.3 °C (97.3 °F)     Resp 14 16 16 16 14     Height      1.753 m (5' 9\") 1.753 m (5' 9\")   Weight (lb)      202 200   BMI      29.83 kg/m2 29.53 kg/m2   BSA (m2)      2.11 m2 2.1 m2   Visit Report      Report Report      There is no height or weight on file to calculate BMI.    General: Well-appearing, no acute distress    Skin intact bilateral upper and lower extremities  No erythema  No warmth    Detailed examination of the left shoulder demonstrates:  Surgical incision(s) well healed  No erythema or warmth  No ecchymosis or soft tissue swelling  Biceps contour normal  Shoulder range of motion: PROM forward flexion 145, ER 45, IR mid lumbar  Good resistance with Jobes, ER and belly press testing  Upper extremity motor grossly intact  C5-T1 sensation intact bilaterally  2+ radial pulses bilaterally  Warm and well-perfused, brisk capillary refill    IMAGING:   No new imaging  "

## 2025-04-16 NOTE — PROGRESS NOTES
PRIMARY CARE PHYSICIAN: Ernie Chaidez MD    ORTHOPAEDIC FOLLOW-UP: Shoulder Evaluation    ASSESSMENT & PLAN    Impression: 69 y.o. male with a new injury of right shoulder concerning for new acute rotator cuff tear.    Plan:   I reviewed with the patient the nature of their diagnosis.  I reviewed their imaging studies with them.    Based on the history, physical exam and imaging studies above, the patient's presentation is consistent with the above diagnosis.  I had a long discussion with the patient regarding their presentation and the treatment options.  We discussed initial nonoperative versus operative management options as well as potential further diagnostic imaging.  I reviewed his examination and diagnostic and treatment steps with him.  He has a history of a prior rotator cuff repair on this side and was doing very well until he had this recent acute injury.  Given the concern for possible rotator cuff tear, I recommend an MRI of the right shoulder to rule out a new acute full-thickness rotator cuff tear that may require surgical intervention  Prior to proceeding with any further nonoperative management.    The MRI is medically necessary and indicated given the patient's subjective complaints and physical exam findings as described below . The MRI will be used for potential presurgical planning purposes. The MRI should be performed in a hospital setting so the surgeon has immediate access to the images.    Follow-Up: Patient will follow-up after the MRI is completed to review the imaging studies and discuss a treatment plan going forward    At the end of the visit, all questions were answered in full. The patient is in agreement with the plan and recommendations. They will call the office with any questions/concerns.    Note dictated with ishBowl software. Completed without full typed error editing and sent to avoid delay.     SUBJECTIVE  CHIEF COMPLAINT: New right shoulder injury      HPI: Bony Fiore Sr. is a 69 y.o. male who complains of a new injury of right shoulder injury after lifting a cooler on 3/22/25.  He has a history of a right shoulder rotator of repair performed approximately 1 year ago.  He was doing excellent until he had this new acute injury while lifting a heavy cooler.  Since then he has had significant pain and dysfunction with associated weakness on arm extended and overhead activities.  No neck pain.  No numbness tingling or paresthesias.    REVIEW OF SYSTEMS  Constitutional: See HPI for pain assessment, No significant weight loss, recent trauma  Cardiovascular: No chest pain, shortness of breath  Respiratory: No difficulty breathing, cough  Gastrointestinal: No nausea, vomiting, diarrhea, constipation  Musculoskeletal: Noted in HPI, positive for pain, restricted motion, stiffness  Integumentary: No rashes, easy bruising, redness   Neurological: no numbness or tingling in extremities, no gait disturbances   Psychiatric: No mood changes, memory changes, social issues  Heme/Lymph: no excessive swelling, easy bruising, excessive bleeding  ENT: No hearing changes  Eyes: No vision changes    Medical History[1]     Allergies[2]     Surgical History[3]     Family History[4]     Social History     Socioeconomic History    Marital status:      Spouse name: Not on file    Number of children: Not on file    Years of education: Not on file    Highest education level: Not on file   Occupational History    Not on file   Tobacco Use    Smoking status: Former     Current packs/day: 0.00     Average packs/day: 1 pack/day for 15.0 years (15.0 ttl pk-yrs)     Types: Cigarettes     Start date:      Quit date:      Years since quittin.3    Smokeless tobacco: Never   Vaping Use    Vaping status: Never Used   Substance and Sexual Activity    Alcohol use: Yes     Alcohol/week: 13.0 standard drinks of alcohol     Types: 3 Cans of beer, 10 Standard drinks or equivalent  "per week     Comment: 1/2 beers /day    Drug use: Never    Sexual activity: Not on file   Other Topics Concern    Not on file   Social History Narrative    Not on file     Social Drivers of Health     Financial Resource Strain: Not on file   Food Insecurity: Not on file   Transportation Needs: Not on file   Physical Activity: Not on file   Stress: Not on file   Social Connections: Not on file   Intimate Partner Violence: Not on file   Housing Stability: Not on file        CURRENT MEDICATIONS:   Current Medications[5]     OBJECTIVE    PHYSICAL EXAM      12/17/2024     2:55 PM 12/17/2024     3:10 PM 12/17/2024     3:26 PM 12/30/2024     8:25 AM 1/27/2025     8:43 AM 3/17/2025     1:49 PM 4/16/2025     9:37 AM   Vitals   Systolic 122 128 126       Diastolic 76 86 85       Heart Rate 78 71 81       Temp 36.1 °C (97 °F) 36.2 °C (97.2 °F) 36.3 °C (97.3 °F)       Resp 16 16 14       Height    1.753 m (5' 9\") 1.753 m (5' 9\") 1.753 m (5' 9\") 1.753 m (5' 9\")   Weight (lb)    202 200 200 200   BMI    29.83 kg/m2 29.53 kg/m2 29.53 kg/m2 29.53 kg/m2   BSA (m2)    2.11 m2 2.1 m2 2.1 m2 2.1 m2   Visit Report    Report Report Report Report      Body mass index is 29.53 kg/m².    GENERAL: A/Ox3, NAD. Appears healthy, well nourished  PSYCHIATRIC: Mood stable, appropriate memory recall  EYES: EOM intact, no scleral icterus  CARDIOVASCULAR: Palpable peripheral pulses  LUNGS: Breathing non-labored on room air  SKIN: no erythema, rashes, or ecchymosis     MUSCULOSKELETAL:  Laterality: right Shoulder Exam  - Negative Spurlings, full painless neck ROM  - Skin intact  - No erythema or warmth  - No ecchymosis or soft tissue swelling  - Shoulder ROM: Forward flexion to 150 with a hitch at 90, ER 40, IR upper lumbar  - Strength:      Jobes 4-/5     ER 4/5     Belly press and bearhug 4+/5  - Palpation: Positive tenderness lateral acromion  - Chiu and Neers positive  - Speeds and O'Briens equivocal    NEUROVASCULAR:  - Neurovascular Status: " sensation intact to light touch distally, upper extremity motor grossly intact  - Capillary refill brisk at extremities, peripheral pulses 2+    Imaging: No new imaging        Sean Osoroi MD  Attending Surgeon    Sports Medicine Orthopaedic Surgery  [facility]  [facility] [employer]          [1]   Past Medical History:  Diagnosis Date    GERD (gastroesophageal reflux disease)     Other conditions influencing health status     Ulcer   [2] No Known Allergies  [3]   Past Surgical History:  Procedure Laterality Date    COLONOSCOPY      NASAL SEPTUM SURGERY  10/26/2015    Nasal Septal Deviation Repair    SHOULDER ARTHROSCOPY Right    [4] No family history on file.  [5]   Current Outpatient Medications   Medication Sig Dispense Refill    ammonium lactate (Amlactin) 12 % cream Apply once daily to both legs. 385 g 11    aspirin-acetaminophen-caffeine (Excedrin Migraine) 250-250-65 mg tablet Take 1 tablet by mouth every 6 hours if needed for headaches.      cyanocobalamin (Vitamin B-12) 1,000 mcg tablet Take 1 tablet (1,000 mcg) by mouth once daily.      fexofenadine-pseudoephedrine (Allegra-D)  mg 12 hr tablet Take 1 tablet by mouth 2 times a day as needed for allergies. Do not crush, chew, or split.      glucosamine-chondroitin 500-400 mg tablet Take 1 tablet by mouth once daily.      multivitamin tablet Take 1 tablet by mouth once daily.      omega 3-dha-epa-fish oil (Fish OiL) 1,000 mg (120 mg-180 mg) capsule Take 1 capsule (1,000 mg) by mouth once daily.      red yeast rice 600 mg capsule Take 600 mg by mouth once daily.      TURMERIC ORAL Take 1 capsule by mouth once daily.       No current facility-administered medications for this visit.

## 2025-05-02 ENCOUNTER — HOSPITAL ENCOUNTER (OUTPATIENT)
Dept: RADIOLOGY | Facility: CLINIC | Age: 70
Discharge: HOME | End: 2025-05-02
Payer: COMMERCIAL

## 2025-05-02 DIAGNOSIS — S46.011A TRAUMATIC TEAR OF RIGHT ROTATOR CUFF, UNSPECIFIED TEAR EXTENT, INITIAL ENCOUNTER: ICD-10-CM

## 2025-05-02 PROCEDURE — 73221 MRI JOINT UPR EXTREM W/O DYE: CPT | Mod: RT

## 2025-05-05 ENCOUNTER — APPOINTMENT (OUTPATIENT)
Dept: ORTHOPEDIC SURGERY | Facility: CLINIC | Age: 70
End: 2025-05-05
Payer: COMMERCIAL

## 2025-05-05 VITALS — BODY MASS INDEX: 29.62 KG/M2 | HEIGHT: 69 IN | WEIGHT: 200 LBS

## 2025-05-05 DIAGNOSIS — M25.811 SHOULDER IMPINGEMENT, RIGHT: Primary | ICD-10-CM

## 2025-05-05 PROCEDURE — 1159F MED LIST DOCD IN RCRD: CPT | Performed by: STUDENT IN AN ORGANIZED HEALTH CARE EDUCATION/TRAINING PROGRAM

## 2025-05-05 PROCEDURE — 99214 OFFICE O/P EST MOD 30 MIN: CPT | Performed by: STUDENT IN AN ORGANIZED HEALTH CARE EDUCATION/TRAINING PROGRAM

## 2025-05-05 PROCEDURE — 3008F BODY MASS INDEX DOCD: CPT | Performed by: STUDENT IN AN ORGANIZED HEALTH CARE EDUCATION/TRAINING PROGRAM

## 2025-05-05 PROCEDURE — 1160F RVW MEDS BY RX/DR IN RCRD: CPT | Performed by: STUDENT IN AN ORGANIZED HEALTH CARE EDUCATION/TRAINING PROGRAM

## 2025-05-05 PROCEDURE — 1036F TOBACCO NON-USER: CPT | Performed by: STUDENT IN AN ORGANIZED HEALTH CARE EDUCATION/TRAINING PROGRAM

## 2025-05-05 PROCEDURE — 1123F ACP DISCUSS/DSCN MKR DOCD: CPT | Performed by: STUDENT IN AN ORGANIZED HEALTH CARE EDUCATION/TRAINING PROGRAM

## 2025-05-05 ASSESSMENT — PAIN - FUNCTIONAL ASSESSMENT: PAIN_FUNCTIONAL_ASSESSMENT: NO/DENIES PAIN

## 2025-05-05 NOTE — PROGRESS NOTES
PRIMARY CARE PHYSICIAN: Ernie Chaidez MD    ORTHOPAEDIC FOLLOW-UP: Shoulder Evaluation    ASSESSMENT & PLAN    Impression: 69 y.o. male with right shoulder bursitis with an intact prior rotator cuff repair.    Plan:   I reviewed with the patient the nature of their diagnosis.  I reviewed their imaging studies with them.    Based on the history, physical exam and imaging studies above, the patient's presentation is consistent with the above diagnosis.  I had a long discussion with the patient regarding their presentation and the treatment options.  We discussed initial nonoperative versus operative management options as well as potential further diagnostic imaging.  I reviewed his examination and MRI findings with him.  His MRI confirms an intact prior rotator cuff repair with some subacromial bursitis/impingement.  We discussed his treatment options going forward.  He will work on his rotator cuff strength and scapular stabilization.  He will continue to ice and rest the shoulder.  He will avoid aggravating motions and positions.    Follow-Up: Patient will follow-up as needed    At the end of the visit, all questions were answered in full. The patient is in agreement with the plan and recommendations. They will call the office with any questions/concerns.    Note dictated with LOFTY software. Completed without full typed error editing and sent to avoid delay.     SUBJECTIVE  CHIEF COMPLAINT: New right shoulder injury     HPI: Bony Fiore Sr. is a 69 y.o. male who complains of a new injury of right shoulder injury after lifting a cooler on 3/22/25.  He returns to clinic to discuss MRI results for right shoulder, completed 5/2/25.  He notes improvement in his pain and dysfunction.  He is here to review his MRI and discuss a treatment plan going forward.    REVIEW OF SYSTEMS  Constitutional: See HPI for pain assessment, No significant weight loss, recent trauma  Cardiovascular: No chest  pain, shortness of breath  Respiratory: No difficulty breathing, cough  Gastrointestinal: No nausea, vomiting, diarrhea, constipation  Musculoskeletal: Noted in HPI, positive for pain, restricted motion, stiffness  Integumentary: No rashes, easy bruising, redness   Neurological: no numbness or tingling in extremities, no gait disturbances   Psychiatric: No mood changes, memory changes, social issues  Heme/Lymph: no excessive swelling, easy bruising, excessive bleeding  ENT: No hearing changes  Eyes: No vision changes    Medical History[1]     Allergies[2]     Surgical History[3]     Family History[4]     Social History     Socioeconomic History    Marital status:      Spouse name: Not on file    Number of children: Not on file    Years of education: Not on file    Highest education level: Not on file   Occupational History    Not on file   Tobacco Use    Smoking status: Former     Current packs/day: 0.00     Average packs/day: 1 pack/day for 15.0 years (15.0 ttl pk-yrs)     Types: Cigarettes     Start date:      Quit date:      Years since quittin.3    Smokeless tobacco: Never   Vaping Use    Vaping status: Never Used   Substance and Sexual Activity    Alcohol use: Yes     Alcohol/week: 13.0 standard drinks of alcohol     Types: 3 Cans of beer, 10 Standard drinks or equivalent per week     Comment: 1/2 beers /day    Drug use: Never    Sexual activity: Not on file   Other Topics Concern    Not on file   Social History Narrative    Not on file     Social Drivers of Health     Financial Resource Strain: Not on file   Food Insecurity: Not on file   Transportation Needs: Not on file   Physical Activity: Not on file   Stress: Not on file   Social Connections: Not on file   Intimate Partner Violence: Not on file   Housing Stability: Not on file        CURRENT MEDICATIONS:   Current Medications[5]     OBJECTIVE    PHYSICAL EXAM      2024     3:10 PM 2024     3:26 PM 2024     8:25 AM  "1/27/2025     8:43 AM 3/17/2025     1:49 PM 4/16/2025     9:37 AM 5/2/2025     8:08 AM   Vitals   Systolic 128 126        Diastolic 86 85        Heart Rate 71 81        Temp 36.2 °C (97.2 °F) 36.3 °C (97.3 °F)        Resp 16 14        Height   1.753 m (5' 9\") 1.753 m (5' 9\") 1.753 m (5' 9\") 1.753 m (5' 9\")    Weight (lb)   202 200 200 200 195   BMI   29.83 kg/m2 29.53 kg/m2 29.53 kg/m2 29.53 kg/m2 28.8 kg/m2   BSA (m2)   2.11 m2 2.1 m2 2.1 m2 2.1 m2 2.08 m2   Visit Report   Report Report Report Report       There is no height or weight on file to calculate BMI.    GENERAL: A/Ox3, NAD. Appears healthy, well nourished  PSYCHIATRIC: Mood stable, appropriate memory recall  EYES: EOM intact, no scleral icterus  CARDIOVASCULAR: Palpable peripheral pulses  LUNGS: Breathing non-labored on room air  SKIN: no erythema, rashes, or ecchymosis     MUSCULOSKELETAL:  Laterality: right Shoulder Exam  - Negative Spurlings, full painless neck ROM  - Skin intact  - No erythema or warmth  - No ecchymosis or soft tissue swelling  - Shoulder ROM: Forward flexion to 150 with a hitch at 90, ER 40, IR upper lumbar  - Strength:      Jobes 4+/5     ER 5-/5     Belly press and bearhug 5-/5  - Palpation: Mild tenderness lateral acromion, improving  - Chiu and Neers positive  - Speeds and O'Briens equivocal    NEUROVASCULAR:  - Neurovascular Status: sensation intact to light touch distally, upper extremity motor grossly intact  - Capillary refill brisk at extremities, peripheral pulses 2+    Imaging: MRI of the right shoulder reviewed by me demonstrates an intact prior rotator cuff repair with some mild undersurface delaminated tearing without evidence of full-thickness tear, subacromial impingement/bursitis.        Sean Osorio MD  Attending Surgeon    Sports Medicine Orthopaedic Surgery  [facility] [employer]          [1]   Past " Medical History:  Diagnosis Date    GERD (gastroesophageal reflux disease)     Other conditions influencing health status     Ulcer   [2] No Known Allergies  [3]   Past Surgical History:  Procedure Laterality Date    COLONOSCOPY      NASAL SEPTUM SURGERY  10/26/2015    Nasal Septal Deviation Repair    SHOULDER ARTHROSCOPY Right    [4] No family history on file.  [5]   Current Outpatient Medications   Medication Sig Dispense Refill    ammonium lactate (Amlactin) 12 % cream Apply once daily to both legs. 385 g 11    aspirin-acetaminophen-caffeine (Excedrin Migraine) 250-250-65 mg tablet Take 1 tablet by mouth every 6 hours if needed for headaches.      cyanocobalamin (Vitamin B-12) 1,000 mcg tablet Take 1 tablet (1,000 mcg) by mouth once daily.      fexofenadine-pseudoephedrine (Allegra-D)  mg 12 hr tablet Take 1 tablet by mouth 2 times a day as needed for allergies. Do not crush, chew, or split.      glucosamine-chondroitin 500-400 mg tablet Take 1 tablet by mouth once daily.      multivitamin tablet Take 1 tablet by mouth once daily.      omega 3-dha-epa-fish oil (Fish OiL) 1,000 mg (120 mg-180 mg) capsule Take 1 capsule (1,000 mg) by mouth once daily.      red yeast rice 600 mg capsule Take 600 mg by mouth once daily.      TURMERIC ORAL Take 1 capsule by mouth once daily.       No current facility-administered medications for this visit.

## 2025-07-15 ENCOUNTER — APPOINTMENT (OUTPATIENT)
Dept: DERMATOLOGY | Facility: CLINIC | Age: 70
End: 2025-07-15
Payer: COMMERCIAL

## 2025-07-15 DIAGNOSIS — D48.5 NEOPLASM OF UNCERTAIN BEHAVIOR OF SKIN: Primary | ICD-10-CM

## 2025-07-15 DIAGNOSIS — L57.0 ACTINIC KERATOSIS: ICD-10-CM

## 2025-07-15 DIAGNOSIS — D22.5 MELANOCYTIC NEVUS OF TRUNK: ICD-10-CM

## 2025-07-15 DIAGNOSIS — L71.9 ROSACEA: ICD-10-CM

## 2025-07-15 DIAGNOSIS — D18.01 HEMANGIOMA OF SKIN: ICD-10-CM

## 2025-07-15 DIAGNOSIS — Z87.2 HISTORY OF ACTINIC KERATOSES: ICD-10-CM

## 2025-07-15 DIAGNOSIS — L57.8 DIFFUSE PHOTODAMAGE OF SKIN: ICD-10-CM

## 2025-07-15 DIAGNOSIS — L82.1 SEBORRHEIC KERATOSIS: ICD-10-CM

## 2025-07-15 PROCEDURE — 99214 OFFICE O/P EST MOD 30 MIN: CPT | Performed by: DERMATOLOGY

## 2025-07-15 PROCEDURE — 17000 DESTRUCT PREMALG LESION: CPT | Performed by: DERMATOLOGY

## 2025-07-15 PROCEDURE — 1159F MED LIST DOCD IN RCRD: CPT | Performed by: DERMATOLOGY

## 2025-07-15 PROCEDURE — 11102 TANGNTL BX SKIN SINGLE LES: CPT | Performed by: DERMATOLOGY

## 2025-07-15 PROCEDURE — 17003 DESTRUCT PREMALG LES 2-14: CPT | Performed by: DERMATOLOGY

## 2025-07-15 RX ORDER — CLINDAMYCIN PHOSPHATE 10 UG/ML
LOTION TOPICAL 2 TIMES DAILY
Qty: 60 ML | Refills: 11 | Status: SHIPPED | OUTPATIENT
Start: 2025-07-15 | End: 2026-07-15

## 2025-07-15 ASSESSMENT — DERMATOLOGY PATIENT ASSESSMENT
DO YOU USE A TANNING BED: NO
DO YOU HAVE ANY NEW OR CHANGING LESIONS: YES
WHERE ARE THESE NEW OR CHANGING LESIONS LOCATED: LEFT CHIN

## 2025-07-15 ASSESSMENT — DERMATOLOGY QUALITY OF LIFE (QOL) ASSESSMENT
RATE HOW BOTHERED YOU ARE BY SYMPTOMS OF YOUR SKIN PROBLEM (EG, ITCHING, STINGING BURNING, HURTING OR SKIN IRRITATION): 0 - NEVER BOTHERED
RATE HOW EMOTIONALLY BOTHERED YOU ARE BY YOUR SKIN PROBLEM (FOR EXAMPLE, WORRY, EMBARRASSMENT, FRUSTRATION): 0 - NEVER BOTHERED
DATE THE QUALITY-OF-LIFE ASSESSMENT WAS COMPLETED: 67401
WHAT SINGLE SKIN CONDITION LISTED BELOW IS THE PATIENT ANSWERING THE QUALITY-OF-LIFE ASSESSMENT QUESTIONS ABOUT: NONE OF THE ABOVE
ARE THERE EXCLUSIONS OR EXCEPTIONS FOR THE QUALITY OF LIFE ASSESSMENT: NO
RATE HOW BOTHERED YOU ARE BY EFFECTS OF YOUR SKIN PROBLEMS ON YOUR ACTIVITIES (EG, GOING OUT, ACCOMPLISHING WHAT YOU WANT, WORK ACTIVITIES OR YOUR RELATIONSHIPS WITH OTHERS): 0 - NEVER BOTHERED

## 2025-07-15 ASSESSMENT — ITCH NUMERIC RATING SCALE: HOW SEVERE IS YOUR ITCHING?: 0

## 2025-07-15 ASSESSMENT — PATIENT GLOBAL ASSESSMENT (PGA): PATIENT GLOBAL ASSESSMENT: PATIENT GLOBAL ASSESSMENT:  1 - CLEAR

## 2025-07-15 NOTE — Clinical Note
Rosacea -papulo-pustular type.  The chronic and intermittently flaring nature of this condition and treatment options were discussed extensively with the patient today.  At this time, I recommend topical therapy with Clindamycin 1% lotion, which the patient was instructed to apply twice daily to the affected areas of the face.  I also discussed the various triggers of rosacea with the patient today, especially sun exposure, and emphasized the importance of trigger avoidance, particularly sun avoidance and sun protection with daily sunscreen use.  The risks, benefits, and side effects of this medication were discussed.  The patient expressed understanding and is in agreement with this plan.   [Initial] : initial visit for [Modified Barium Swallow] : modified barium swallow [FreeTextEntry1] :  Dr. Aaron Ortiz

## 2025-07-15 NOTE — Clinical Note
History of actinic keratoses and photodamage.  The signs and symptoms of skin cancer were reviewed and the patient was advised to practice sun protection and sun avoidance, use daily sunscreen, and perform regular self skin exams.  I will have the patient return to our office in 1 year, pending the above biopsy result, for routine follow-up and skin exam, and the patient was instructed to call our office should the patient notice any new, changing, symptomatic, or otherwise concerning skin lesions before then.  The patient expressed understanding and is in agreement with this plan.

## 2025-07-15 NOTE — Clinical Note
Wright Angiomas - the benign nature of these vascular lesions was discussed with the patient today and reassurance provided.  No treatment is medically indicated for these lesions at this time.

## 2025-07-16 NOTE — PROGRESS NOTES
Nell Fiore Sr. is a 69 y.o. male who presents for the following: Skin Check.  He notes intermittent pimple breakouts on his face, especially his cheeks.  He denies any new, changing, or concerning skin lesions since his last visit; no bleeding, itching, or burning lesions.      Review of Systems:  No other skin or systemic complaints other than what is documented elsewhere in the note.    The following portions of the chart were reviewed this encounter and updated as appropriate:       Skin Cancer History  Biopsy Log Book  No skin cancers from Specimen Tracking.    Additional History      Specialty Problems    None      Past Dermatologic / Past Relevant Medical History:    - history of AKs  - no history of atypical nevi or skin cancer    Family History:    Father - skin cancer of unknown type    Social History:    [place]     Allergies:  Patient has no known allergies.    Current Medications / CAM's:  Current Medications[1]     Objective   Well appearing patient in no apparent distress; mood and affect are within normal limits.    A full examination was performed including scalp, face, eyes, ears, nose, lips, neck, chest, axillae, abdomen, back, bilateral upper extremities, and bilateral lower extremities. All findings within normal limits unless otherwise noted below.    Assessment/Plan   Skin Exam  1. NEOPLASM OF UNCERTAIN BEHAVIOR OF SKIN  Right Lateral Proximal Arm  9 mm pink, shiny, scaly papule     Lesion biopsy  Type of biopsy: tangential    Informed consent: discussed and consent obtained    Timeout: patient name, date of birth, surgical site, and procedure verified    Procedure prep:  Patient was prepped and draped  Anesthesia: the lesion was anesthetized in a standard fashion    Anesthetic:  1% lidocaine w/ epinephrine 1-100,000 local infiltration  Instrument used: flexible razor blade    Hemostasis achieved with: aluminum  chloride    Outcome: patient tolerated procedure well    Post-procedure details: wound care instructions given      Staff Communication: Dermatology Local Anesthesia: 1 % Lidocaine / Epinephrine - Amount:0.5ml  Specimen 1 - Dermatopathology- DERM LAB  Differential Diagnosis: AK v BCC v BLK  Check Margins Yes/No?:    Comments:    Dermpath Lab: Routine Histopathology (formalin-fixed tissue)  2. DIFFUSE PHOTODAMAGE OF SKIN  Generalized  Diffuse photodamage with actinic changes with telangiectasia and mottled pigmentation in sun-exposed areas.  Photodamage.  The signs and symptoms of skin cancer were reviewed and the patient was advised to practice sun protection and sun avoidance, use daily sunscreen, and perform regular self skin exams.  Sun protection was discussed, including avoiding the mid-day sun, wearing a sunscreen with SPF at least 50, and stressing the need for reapplication of sunscreen and applying more than they think they need.  Related Procedures  Follow Up In Dermatology - Established Patient  3. ACTINIC KERATOSIS (6)  Head - Anterior (Face) (6)  Scattered on the patient's bilateral cheeks, there are 6 erythematous, gritty, scaly macules   Actinic Keratoses -scattered on bilateral cheeks.  The pre-cancerous nature of these lesions and treatment options were discussed with the patient today.  At this time, I recommend treatment with liquid nitrogen cryotherapy.  The patient expressed understanding, is in agreement with this plan, and wishes to proceed with cryotherapy today.  Destr of lesion - Head - Anterior (Face) (6)  Complexity: simple    Destruction method: cryotherapy    Informed consent: discussed and consent obtained    Lesion destroyed using liquid nitrogen: Yes    Cryotherapy cycles:  1  Outcome: patient tolerated procedure well with no complications    Post-procedure details: wound care instructions given    4. MELANOCYTIC NEVUS OF TRUNK  Generalized  Scattered on the patient's face, neck,  trunk, and extremities, there are several small, round- to oval-shaped, brown-pigmented and pink-colored, symmetric, uniform-appearing macules and dome-shaped papules  Clinically benign- to slightly atypical-appearing nevi - the clinically benign- to slightly atypical-appearing nature of the patient's nevi was discussed with the patient today.  None of the patient's nevi meet threshold for biopsy today.  I emphasized the importance of performing monthly self-skin exams using the ABCDs of monitoring moles, which were reviewed with the patient today and an informational hand-out provided.  I also emphasized the importance of sun avoidance and sun protection with daily sunscreen use.  The patient expressed understanding and is in agreement with this plan.  5. SEBORRHEIC KERATOSIS  Generalized  Scattered on the patient's face, neck, trunk, and extremities, there are multiple tan- to light brown-colored, hyperkeratotic, stuck-on appearing papules of varying size and shape  Seborrheic Keratoses - the benign nature of these lesions was discussed with the patient today and reassurance provided.  No treatment is medically indicated for these lesions at this time.  6. HEMANGIOMA OF SKIN  Generalized  Scattered on the patient's face, neck, trunk, and extremities, there are multiple small, round, cherry red- to purplish-colored, symmetric, uniform, vascular-appearing macules and papules  Cherry Angiomas - the benign nature of these vascular lesions was discussed with the patient today and reassurance provided.  No treatment is medically indicated for these lesions at this time.  7. ROSACEA  Head - Anterior (Face)  On the patient's face, most prominent over the bilateral medial cheeks and nose, there is moderate underlying erythema with several overlying telangiectasias and a few scattered erythematous, inflammatory papules   Rosacea -papulo-pustular type.  The chronic and intermittently flaring nature of this condition and  treatment options were discussed extensively with the patient today.  At this time, I recommend topical therapy with Clindamycin 1% lotion, which the patient was instructed to apply twice daily to the affected areas of the face.  I also discussed the various triggers of rosacea with the patient today, especially sun exposure, and emphasized the importance of trigger avoidance, particularly sun avoidance and sun protection with daily sunscreen use.  The risks, benefits, and side effects of this medication were discussed.  The patient expressed understanding and is in agreement with this plan.  clindamycin (Cleocin T) 1 % lotion - Head - Anterior (Face)  Apply topically 2 times a day.  8. HISTORY OF ACTINIC KERATOSES  Generalized  There is evidence of photodamage in sun-exposed areas.  History of actinic keratoses and photodamage.  The signs and symptoms of skin cancer were reviewed and the patient was advised to practice sun protection and sun avoidance, use daily sunscreen, and perform regular self skin exams.  I will have the patient return to our office in 1 year, pending the above biopsy result, for routine follow-up and skin exam, and the patient was instructed to call our office should the patient notice any new, changing, symptomatic, or otherwise concerning skin lesions before then.  The patient expressed understanding and is in agreement with this plan.          [1]   Current Outpatient Medications:     ammonium lactate (Amlactin) 12 % cream, Apply once daily to both legs., Disp: 385 g, Rfl: 11    aspirin-acetaminophen-caffeine (Excedrin Migraine) 250-250-65 mg tablet, Take 1 tablet by mouth every 6 hours if needed for headaches., Disp: , Rfl:     clindamycin (Cleocin T) 1 % lotion, Apply topically 2 times a day., Disp: 60 mL, Rfl: 11    cyanocobalamin (Vitamin B-12) 1,000 mcg tablet, Take 1 tablet (1,000 mcg) by mouth once daily., Disp: , Rfl:     fexofenadine-pseudoephedrine (Allegra-D)  mg 12 hr  tablet, Take 1 tablet by mouth 2 times a day as needed for allergies. Do not crush, chew, or split., Disp: , Rfl:     glucosamine-chondroitin 500-400 mg tablet, Take 1 tablet by mouth once daily., Disp: , Rfl:     multivitamin tablet, Take 1 tablet by mouth once daily., Disp: , Rfl:     omega 3-dha-epa-fish oil (Fish OiL) 1,000 mg (120 mg-180 mg) capsule, Take 1 capsule (1,000 mg) by mouth once daily., Disp: , Rfl:     red yeast rice 600 mg capsule, Take 600 mg by mouth once daily., Disp: , Rfl:     TURMERIC ORAL, Take 1 capsule by mouth once daily., Disp: , Rfl:

## 2025-07-17 LAB
LABORATORY COMMENT REPORT: NORMAL
PATH REPORT.FINAL DX SPEC: NORMAL
PATH REPORT.GROSS SPEC: NORMAL
PATH REPORT.MICROSCOPIC SPEC OTHER STN: NORMAL
PATH REPORT.RELEVANT HX SPEC: NORMAL
PATH REPORT.TOTAL CANCER: NORMAL

## 2025-11-10 ENCOUNTER — APPOINTMENT (OUTPATIENT)
Dept: DERMATOLOGY | Facility: CLINIC | Age: 70
End: 2025-11-10
Payer: COMMERCIAL

## 2025-11-20 ENCOUNTER — APPOINTMENT (OUTPATIENT)
Dept: PRIMARY CARE | Facility: CLINIC | Age: 70
End: 2025-11-20
Payer: COMMERCIAL

## 2026-01-07 ENCOUNTER — APPOINTMENT (OUTPATIENT)
Dept: DERMATOLOGY | Facility: CLINIC | Age: 71
End: 2026-01-07
Payer: COMMERCIAL

## (undated) DEVICE — CANNULA, TRIPLE-DAM TWIST-IN, 8.25MM X 7CM

## (undated) DEVICE — Device

## (undated) DEVICE — GLOVE, SURGICAL, PROTEXIS PI BLUE W/NEUTHERA, 6.5, PF, LF

## (undated) DEVICE — KIT, STABILIZATION SHOULDER

## (undated) DEVICE — TUBING, DUAL WAVE, OUTFLOW

## (undated) DEVICE — KIT, DISPOSABLE, FOR 2.8 Q-FIX SHOULDER

## (undated) DEVICE — SUTURE, CTD, VICRYL, 2-0, UND, BR, CT-2

## (undated) DEVICE — PEN, SKIN MARKER FOR TREPHINES & PUNCHES

## (undated) DEVICE — GLOVE, PROTEXIS PI CLASSIC, SZ-6.5, PF, LF

## (undated) DEVICE — CUTTER, BONE, 5.5 X 13

## (undated) DEVICE — CANNULA, ARTHROSCOPIC TWIST-IN 8.5MM

## (undated) DEVICE — BLANKET, LOWER BODY, VHA PLUS, ADULT

## (undated) DEVICE — DRAPE, INSTRUMENT, W/POUCH, STERI DRAPE, 7 X 11 IN, DISPOSABLE, STERILE

## (undated) DEVICE — BLOCK, FOAM, NEEDLE POP -N-COUNT, 1840, BLACK

## (undated) DEVICE — DRAPE, INCISE, ANTIMICROBIAL, IOBAN 2, 13 X 13 IN, DISPOSABLE, STERILE

## (undated) DEVICE — SUTURE PASSR, SELF, FIRSTPASS, STR, DISP

## (undated) DEVICE — SUTURE, MONOCRYL, 3-0, 27 IN, PS-2, UNDYED

## (undated) DEVICE — NEEDLE, TAPERED, W/ NITIONAL LOOP, T-5, 1/2 CIRCLE, 26.5MM LONG

## (undated) DEVICE — DRESSING, NON-ADHERENT, TELFA, OUCHLESS, 3 X 4 IN, STERILE

## (undated) DEVICE — DRESSING, TRANSPARENT, TEGADERM, FRAME STYLE, 4 X 4.5, STRL

## (undated) DEVICE — GLOVE, SURGICAL, PROTEXIS PI BLUE W/NEUTHERA, 8.0, PF, LF

## (undated) DEVICE — TUBING, SUCTION, 6MM X 10, CLEAN N-COND

## (undated) DEVICE — NEEDLE, HYPODERMIC, MONOJECT, 27 G X 1.5 IN

## (undated) DEVICE — SUTURE, VICRYL, 0, 27 IN, CT-2, UNDYED

## (undated) DEVICE — TIP, SUCTION, YANKAUER, FLEXIBLE

## (undated) DEVICE — TUBING, PUMP MAIN 16FT STERILE

## (undated) DEVICE — KIT, BEACH CHAIR TRIMANO

## (undated) DEVICE — KIT, HIP, FOR 1.8MM Q-FIX IMP, DISP

## (undated) DEVICE — GLOVE, SURGICAL, PROTEXIS PI , 7.5, PF, LF

## (undated) DEVICE — TUBING, PATIENT 8FT STERILE

## (undated) DEVICE — PROBE, APOLLO RF, 90 DEG, EXTRA LARTGE

## (undated) DEVICE — STRIP, SKIN CLOSURE, STERI STRIP, REINFORCED, 0.5 X 4 IN